# Patient Record
Sex: MALE | Race: WHITE | NOT HISPANIC OR LATINO | Employment: OTHER | URBAN - METROPOLITAN AREA
[De-identification: names, ages, dates, MRNs, and addresses within clinical notes are randomized per-mention and may not be internally consistent; named-entity substitution may affect disease eponyms.]

---

## 2018-03-16 ENCOUNTER — OFFICE VISIT CONVERTED (OUTPATIENT)
Dept: CARDIOLOGY | Facility: CLINIC | Age: 73
End: 2018-03-16
Attending: SPECIALIST

## 2018-04-10 ENCOUNTER — OFFICE VISIT CONVERTED (OUTPATIENT)
Dept: FAMILY MEDICINE CLINIC | Facility: CLINIC | Age: 73
End: 2018-04-10
Attending: FAMILY MEDICINE

## 2018-04-18 ENCOUNTER — CONVERSION ENCOUNTER (OUTPATIENT)
Dept: ORTHOPEDIC SURGERY | Facility: CLINIC | Age: 73
End: 2018-04-18

## 2018-04-18 ENCOUNTER — OFFICE VISIT CONVERTED (OUTPATIENT)
Dept: ORTHOPEDIC SURGERY | Facility: CLINIC | Age: 73
End: 2018-04-18
Attending: ORTHOPAEDIC SURGERY

## 2018-06-12 ENCOUNTER — CONVERSION ENCOUNTER (OUTPATIENT)
Dept: FAMILY MEDICINE CLINIC | Facility: CLINIC | Age: 73
End: 2018-06-12

## 2018-06-12 ENCOUNTER — OFFICE VISIT CONVERTED (OUTPATIENT)
Dept: FAMILY MEDICINE CLINIC | Facility: CLINIC | Age: 73
End: 2018-06-12
Attending: FAMILY MEDICINE

## 2018-06-20 ENCOUNTER — OFFICE VISIT CONVERTED (OUTPATIENT)
Dept: ORTHOPEDIC SURGERY | Facility: CLINIC | Age: 73
End: 2018-06-20
Attending: PHYSICIAN ASSISTANT

## 2018-06-20 ENCOUNTER — CONVERSION ENCOUNTER (OUTPATIENT)
Dept: ORTHOPEDIC SURGERY | Facility: CLINIC | Age: 73
End: 2018-06-20

## 2018-08-01 ENCOUNTER — OFFICE VISIT CONVERTED (OUTPATIENT)
Dept: ORTHOPEDIC SURGERY | Facility: CLINIC | Age: 73
End: 2018-08-01
Attending: PHYSICIAN ASSISTANT

## 2018-08-01 ENCOUNTER — CONVERSION ENCOUNTER (OUTPATIENT)
Dept: ORTHOPEDIC SURGERY | Facility: CLINIC | Age: 73
End: 2018-08-01

## 2018-09-12 ENCOUNTER — OFFICE VISIT CONVERTED (OUTPATIENT)
Dept: ORTHOPEDIC SURGERY | Facility: CLINIC | Age: 73
End: 2018-09-12
Attending: PHYSICIAN ASSISTANT

## 2018-09-12 ENCOUNTER — CONVERSION ENCOUNTER (OUTPATIENT)
Dept: ORTHOPEDIC SURGERY | Facility: CLINIC | Age: 73
End: 2018-09-12

## 2018-09-24 ENCOUNTER — CONVERSION ENCOUNTER (OUTPATIENT)
Dept: FAMILY MEDICINE CLINIC | Facility: CLINIC | Age: 73
End: 2018-09-24

## 2018-09-24 ENCOUNTER — OFFICE VISIT CONVERTED (OUTPATIENT)
Dept: FAMILY MEDICINE CLINIC | Facility: CLINIC | Age: 73
End: 2018-09-24
Attending: FAMILY MEDICINE

## 2018-09-27 ENCOUNTER — OFFICE VISIT CONVERTED (OUTPATIENT)
Dept: CARDIOLOGY | Facility: CLINIC | Age: 73
End: 2018-09-27
Attending: INTERNAL MEDICINE

## 2019-02-07 ENCOUNTER — OFFICE VISIT CONVERTED (OUTPATIENT)
Dept: GASTROENTEROLOGY | Facility: CLINIC | Age: 74
End: 2019-02-07
Attending: PHYSICIAN ASSISTANT

## 2019-02-25 ENCOUNTER — HOSPITAL ENCOUNTER (OUTPATIENT)
Dept: GASTROENTEROLOGY | Facility: HOSPITAL | Age: 74
Setting detail: HOSPITAL OUTPATIENT SURGERY
Discharge: HOME | End: 2019-02-25
Attending: INTERNAL MEDICINE

## 2019-02-25 LAB
BASOPHILS # BLD AUTO: 0.03 10*3/UL (ref 0–0.2)
BASOPHILS NFR BLD AUTO: 0.5 % (ref 0–3)
CONV ABS IMM GRAN: 0.02 10*3/UL (ref 0–0.2)
CONV IMMATURE GRAN: 0.3 % (ref 0–1.8)
DEPRECATED RDW RBC AUTO: 44.4 FL (ref 35.1–43.9)
EOSINOPHIL # BLD AUTO: 0.26 10*3/UL (ref 0–0.7)
EOSINOPHIL # BLD AUTO: 4.5 % (ref 0–7)
ERYTHROCYTE [DISTWIDTH] IN BLOOD BY AUTOMATED COUNT: 13.1 % (ref 11.6–14.4)
HBA1C MFR BLD: 13.8 G/DL (ref 14–18)
HCT VFR BLD AUTO: 40.9 % (ref 42–52)
LYMPHOCYTES # BLD AUTO: 1.41 10*3/UL (ref 1–5)
MCH RBC QN AUTO: 31 PG (ref 27–31)
MCHC RBC AUTO-ENTMCNC: 33.7 G/DL (ref 33–37)
MCV RBC AUTO: 91.9 FL (ref 80–96)
MONOCYTES # BLD AUTO: 0.42 10*3/UL (ref 0.2–1.2)
MONOCYTES NFR BLD AUTO: 7.3 % (ref 3–10)
NEUTROPHILS # BLD AUTO: 3.64 10*3/UL (ref 2–8)
NEUTROPHILS NFR BLD AUTO: 63 % (ref 30–85)
NRBC CBCN: 0 % (ref 0–0.7)
PLATELET # BLD AUTO: 149 10*3/UL (ref 130–400)
PMV BLD AUTO: 10.1 FL (ref 9.4–12.4)
RBC # BLD AUTO: 4.45 10*6/UL (ref 4.7–6.1)
VARIANT LYMPHS NFR BLD MANUAL: 24.4 % (ref 20–45)
WBC # BLD AUTO: 5.78 10*3/UL (ref 4.8–10.8)

## 2019-04-30 ENCOUNTER — CONVERSION ENCOUNTER (OUTPATIENT)
Dept: CARDIOLOGY | Facility: CLINIC | Age: 74
End: 2019-04-30

## 2019-04-30 ENCOUNTER — OFFICE VISIT CONVERTED (OUTPATIENT)
Dept: CARDIOLOGY | Facility: CLINIC | Age: 74
End: 2019-04-30
Attending: INTERNAL MEDICINE

## 2019-05-09 ENCOUNTER — OFFICE VISIT CONVERTED (OUTPATIENT)
Dept: FAMILY MEDICINE CLINIC | Facility: CLINIC | Age: 74
End: 2019-05-09
Attending: FAMILY MEDICINE

## 2019-05-09 ENCOUNTER — HOSPITAL ENCOUNTER (OUTPATIENT)
Dept: FAMILY MEDICINE CLINIC | Facility: CLINIC | Age: 74
Discharge: HOME | End: 2019-05-09
Attending: FAMILY MEDICINE

## 2019-05-09 ENCOUNTER — CONVERSION ENCOUNTER (OUTPATIENT)
Dept: FAMILY MEDICINE CLINIC | Facility: CLINIC | Age: 74
End: 2019-05-09

## 2019-05-11 LAB — BACTERIA SPEC AEROBE CULT: NORMAL

## 2019-05-30 ENCOUNTER — OFFICE VISIT CONVERTED (OUTPATIENT)
Dept: GASTROENTEROLOGY | Facility: CLINIC | Age: 74
End: 2019-05-30
Attending: PHYSICIAN ASSISTANT

## 2019-05-30 ENCOUNTER — CONVERSION ENCOUNTER (OUTPATIENT)
Dept: GASTROENTEROLOGY | Facility: CLINIC | Age: 74
End: 2019-05-30

## 2019-11-07 ENCOUNTER — OFFICE VISIT CONVERTED (OUTPATIENT)
Dept: CARDIOLOGY | Facility: CLINIC | Age: 74
End: 2019-11-07
Attending: INTERNAL MEDICINE

## 2019-11-12 ENCOUNTER — OFFICE VISIT CONVERTED (OUTPATIENT)
Dept: FAMILY MEDICINE CLINIC | Facility: CLINIC | Age: 74
End: 2019-11-12
Attending: FAMILY MEDICINE

## 2019-11-26 ENCOUNTER — OFFICE VISIT CONVERTED (OUTPATIENT)
Dept: FAMILY MEDICINE CLINIC | Facility: CLINIC | Age: 74
End: 2019-11-26
Attending: FAMILY MEDICINE

## 2019-11-26 ENCOUNTER — CONVERSION ENCOUNTER (OUTPATIENT)
Dept: FAMILY MEDICINE CLINIC | Facility: CLINIC | Age: 74
End: 2019-11-26

## 2019-12-09 ENCOUNTER — OFFICE VISIT CONVERTED (OUTPATIENT)
Dept: PULMONOLOGY | Facility: CLINIC | Age: 74
End: 2019-12-09
Attending: PHYSICIAN ASSISTANT

## 2019-12-27 ENCOUNTER — HOSPITAL ENCOUNTER (OUTPATIENT)
Dept: GENERAL RADIOLOGY | Facility: HOSPITAL | Age: 74
Discharge: HOME | End: 2019-12-27
Attending: FAMILY MEDICINE

## 2020-01-13 ENCOUNTER — HOSPITAL ENCOUNTER (OUTPATIENT)
Dept: CARDIOLOGY | Facility: HOSPITAL | Age: 75
Discharge: HOME | End: 2020-01-13
Attending: PHYSICIAN ASSISTANT

## 2020-02-17 ENCOUNTER — OFFICE VISIT CONVERTED (OUTPATIENT)
Dept: PULMONOLOGY | Facility: CLINIC | Age: 75
End: 2020-02-17
Attending: PHYSICIAN ASSISTANT

## 2020-05-14 ENCOUNTER — CONVERSION ENCOUNTER (OUTPATIENT)
Dept: FAMILY MEDICINE CLINIC | Facility: CLINIC | Age: 75
End: 2020-05-14

## 2020-05-14 ENCOUNTER — OFFICE VISIT CONVERTED (OUTPATIENT)
Dept: FAMILY MEDICINE CLINIC | Facility: CLINIC | Age: 75
End: 2020-05-14
Attending: FAMILY MEDICINE

## 2020-06-04 ENCOUNTER — OFFICE VISIT CONVERTED (OUTPATIENT)
Dept: GASTROENTEROLOGY | Facility: CLINIC | Age: 75
End: 2020-06-04
Attending: PHYSICIAN ASSISTANT

## 2020-06-12 ENCOUNTER — OFFICE VISIT CONVERTED (OUTPATIENT)
Dept: CARDIOLOGY | Facility: CLINIC | Age: 75
End: 2020-06-12
Attending: INTERNAL MEDICINE

## 2020-06-15 ENCOUNTER — OFFICE VISIT CONVERTED (OUTPATIENT)
Dept: PULMONOLOGY | Facility: CLINIC | Age: 75
End: 2020-06-15
Attending: PHYSICIAN ASSISTANT

## 2020-06-25 ENCOUNTER — HOSPITAL ENCOUNTER (OUTPATIENT)
Dept: GENERAL RADIOLOGY | Facility: HOSPITAL | Age: 75
Discharge: HOME | End: 2020-06-25
Attending: NURSE PRACTITIONER

## 2020-06-25 ENCOUNTER — OFFICE VISIT CONVERTED (OUTPATIENT)
Dept: FAMILY MEDICINE CLINIC | Facility: CLINIC | Age: 75
End: 2020-06-25
Attending: NURSE PRACTITIONER

## 2020-12-11 ENCOUNTER — OFFICE VISIT CONVERTED (OUTPATIENT)
Dept: PULMONOLOGY | Facility: CLINIC | Age: 75
End: 2020-12-11
Attending: NURSE PRACTITIONER

## 2020-12-17 ENCOUNTER — OFFICE VISIT CONVERTED (OUTPATIENT)
Dept: CARDIOLOGY | Facility: CLINIC | Age: 75
End: 2020-12-17
Attending: INTERNAL MEDICINE

## 2020-12-28 ENCOUNTER — HOSPITAL ENCOUNTER (OUTPATIENT)
Dept: GENERAL RADIOLOGY | Facility: HOSPITAL | Age: 75
Discharge: HOME | End: 2020-12-28
Attending: NURSE PRACTITIONER

## 2021-02-09 ENCOUNTER — OFFICE VISIT CONVERTED (OUTPATIENT)
Dept: FAMILY MEDICINE CLINIC | Facility: CLINIC | Age: 76
End: 2021-02-09
Attending: FAMILY MEDICINE

## 2021-02-09 ENCOUNTER — HOSPITAL ENCOUNTER (OUTPATIENT)
Dept: FAMILY MEDICINE CLINIC | Facility: CLINIC | Age: 76
Discharge: HOME | End: 2021-02-09
Attending: FAMILY MEDICINE

## 2021-02-09 ENCOUNTER — CONVERSION ENCOUNTER (OUTPATIENT)
Dept: FAMILY MEDICINE CLINIC | Facility: CLINIC | Age: 76
End: 2021-02-09

## 2021-02-09 LAB
APPEARANCE UR: ABNORMAL
BILIRUB UR QL: NEGATIVE
COLOR UR: YELLOW
CONV BACTERIA: ABNORMAL
CONV COLLECTION SOURCE (UA): ABNORMAL
CONV UROBILINOGEN IN URINE BY AUTOMATED TEST STRIP: 0.2 {EHRLICHU}/DL (ref 0.1–1)
GLUCOSE UR QL: NEGATIVE MG/DL
HGB UR QL STRIP: NEGATIVE
KETONES UR QL STRIP: NEGATIVE MG/DL
LEUKOCYTE ESTERASE UR QL STRIP: ABNORMAL
NITRITE UR QL STRIP: NEGATIVE
PH UR STRIP.AUTO: 5 [PH] (ref 5–8)
PROT UR QL: NEGATIVE MG/DL
RBC #/AREA URNS HPF: ABNORMAL /[HPF]
SP GR UR: 1.02 (ref 1–1.03)
WBC #/AREA URNS HPF: ABNORMAL /[HPF]

## 2021-02-11 LAB
AMPICILLIN SUSC ISLT: >=32
AMPICILLIN+SULBAC SUSC ISLT: >=32
BACTERIA UR CULT: ABNORMAL
CEFAZOLIN SUSC ISLT: 8
CEFEPIME SUSC ISLT: <=0.12
CEFTAZIDIME SUSC ISLT: <=1
CEFTRIAXONE SUSC ISLT: 0.5
CIPROFLOXACIN SUSC ISLT: <=0.25
ERTAPENEM SUSC ISLT: <=0.12
GENTAMICIN SUSC ISLT: <=1
LEVOFLOXACIN SUSC ISLT: <=0.12
NITROFURANTOIN SUSC ISLT: <=16
PIP+TAZO SUSC ISLT: 8
TMP SMX SUSC ISLT: <=20
TOBRAMYCIN SUSC ISLT: <=1

## 2021-03-03 ENCOUNTER — HOSPITAL ENCOUNTER (OUTPATIENT)
Dept: GENERAL RADIOLOGY | Facility: HOSPITAL | Age: 76
Discharge: HOME | End: 2021-03-03
Attending: FAMILY MEDICINE

## 2021-03-04 ENCOUNTER — OFFICE VISIT CONVERTED (OUTPATIENT)
Dept: PULMONOLOGY | Facility: CLINIC | Age: 76
End: 2021-03-04
Attending: PHYSICIAN ASSISTANT

## 2021-03-04 ENCOUNTER — HOSPITAL ENCOUNTER (OUTPATIENT)
Dept: FAMILY MEDICINE CLINIC | Facility: CLINIC | Age: 76
Discharge: HOME | End: 2021-03-04
Attending: FAMILY MEDICINE

## 2021-03-04 LAB
APPEARANCE UR: CLEAR
BILIRUB UR QL: NEGATIVE
COLOR UR: YELLOW
CONV COLLECTION SOURCE (UA): NORMAL
CONV UROBILINOGEN IN URINE BY AUTOMATED TEST STRIP: 0.2 {EHRLICHU}/DL (ref 0.1–1)
GLUCOSE UR QL: NEGATIVE MG/DL
HGB UR QL STRIP: NEGATIVE
KETONES UR QL STRIP: NEGATIVE MG/DL
LEUKOCYTE ESTERASE UR QL STRIP: NEGATIVE
NITRITE UR QL STRIP: NEGATIVE
PH UR STRIP.AUTO: 6 [PH] (ref 5–8)
PROT UR QL: NEGATIVE MG/DL
SP GR UR: 1.02 (ref 1–1.03)

## 2021-03-06 LAB — BACTERIA UR CULT: NORMAL

## 2021-04-14 ENCOUNTER — HOSPITAL ENCOUNTER (OUTPATIENT)
Dept: PREADMISSION TESTING | Facility: HOSPITAL | Age: 76
Discharge: HOME | End: 2021-04-14
Attending: INTERNAL MEDICINE

## 2021-04-14 LAB — SARS-COV-2 RNA SPEC QL NAA+PROBE: NOT DETECTED

## 2021-04-19 ENCOUNTER — HOSPITAL ENCOUNTER (OUTPATIENT)
Dept: GASTROENTEROLOGY | Facility: HOSPITAL | Age: 76
Setting detail: HOSPITAL OUTPATIENT SURGERY
Discharge: HOME | End: 2021-04-19
Attending: INTERNAL MEDICINE

## 2021-05-13 NOTE — PROGRESS NOTES
Progress Note      Patient Name: Frank Winter   Patient ID: [id number]   Sex: Male   YOB: 1945    Primary Care Provider: Frank Mei MD    Visit Date: June 4, 2020    Provider: Smooth Mendoza PA-C   Location: [facility]   Location Address: [address] [place] [address]  [id number]   Location Phone: [phone]          Chief Complaint  · Follow-up      History Of Present Illness     74-year-old male with history of diverticulosis, hemorrhoids, and history of colon polyps returns for his annual follow-up.  EGD/colonoscopy Dr. Griffin last year consistent with diverticulosis of the sigmoid colon, erosive gastritis, hemorrhoids, Tobias's esophagus without dysplasia, multiple tubular adenoma successfully moved from the colon.  He also had H. pylori on biopsy was treated with a Prevpac for 10 days and had H. pylori breath test to confirm eradication.  Recommend he have an EGD in 2 years and a colonoscopy in 3 years.  His brother had colon cancer.       Past Medical History  Anemia; Arthritis; Colon cancer; Colon Polyps; Essential hypertension; Helicobacter pylori (H. pylori); Hernia; High cholesterol; HTN (hypertension); Hyperlipemia; Hypertension; Limb Swelling; Primary osteoarthritis of left hip; Shortness of Breath; Thyroid disorder; Thyroid nodule         Past Surgical History  cardiac stents; Colonoscopy; EGD; Hernia         Medication List  amlodipine 2.5 mg oral tablet; aspirin 81 mg oral tablet,delayed release (DR/EC); atorvastatin 80 mg oral tablet; Bystolic 10 mg oral tablet; lisinopril-hydrochlorothiazide 20-12.5 mg oral tablet; Lovaza 1 gram oral capsule; ProAir HFA 90 mcg/actuation inhalation HFA aerosol inhaler; Spiriva Respimat 2.5 mcg/actuation inhalation mist; Ventolin HFA 90 mcg/actuation inhalation HFA aerosol inhaler         Allergy List  NO KNOWN DRUG ALLERGIES         Family Medical History  Colon Neoplasm, Malignant; Heart Disease; Cancer, Unspecified; Family history  "of colon cancer; Family history of heart disease         Social History  Alcohol (Never); Disabled; lives with spouse; .; Recreational Drug Use (Current some day); Retired.; Sedentary; Tobacco (Current every day)         Review of Systems  · Constitutional  o Denies  o : chills, fever  · Cardiovascular  o Denies  o : chest pain  · Respiratory  o Denies  o : shortness of breath  · Gastrointestinal  o Denies  o : nausea, vomiting, dysphagia  · Endocrine  o Denies  o : weight gain, weight loss      Vitals  Date Time BP Position Site L\R Cuff Size HR RR TEMP (F) WT  HT  BMI kg/m2 BSA m2 O2 Sat HC       06/04/2020 12:53 /70 Sitting    82 - R 16 98.9 227lbs 0oz 5'  10\" 32.57 2.26           Physical Examination  · Constitutional  o Appearance  o : Healthy-appearing, awake and alert in no acute distress  · Head and Face  o Head  o : Normocephalic with no worriesome skin lesions  · Eyes  o Vision  o :   § Visual Fields  § : eyes move symmetrical in all directions  o Sclerae  o : sclerae anicteric  · Neck  o Inspection/Palpation  o : Trachea is midline, no adenopathy  · Respiratory  o Respiratory Effort  o : Breathing is unlabored.  o Inspection of Chest  o : normal appearance  o Auscultation of Lungs  o : Chest is clear to auscultation bilaterally.  · Cardiovascular  o Heart  o :   § Auscultation of Heart  § : no murmurs, rubs, or gallops  o Peripheral Vascular System  o :   § Extremities  § : no cyanosis, clubbing or edema;   · Gastrointestinal  o Abdominal Examination  o : Abdomen is soft, nontender to palpation, with normal active bowel sounds, no appreciable hepatosplenomegaly.          Assessment  · Personal history of colonic polyps     V12.72/Z86.010  · Family history of colon cancer     V16.0/Z80.0  · GERD (gastroesophageal reflux disease)     530.81/K21.9  · Tobias esophagus     530.85/K22.70      Plan  · Medications  o pantoprazole 40 mg oral tablet,delayed release (DR/EC)   SIG: take 1 tablet (40 mg) " by oral route once daily for 90 days   DISP: (90) tablets with 6 refills  Prescribed on 06/04/2020     o Medications have been Reconciled  o Transition of Care or Provider Policy  · Instructions  o 74-year-old with Tobias's esophagus and GERD. I will resume his PPI therapy with Protonix 40 mg daily. He will follow-up as needed. He will be due for an EGD 02/2021.            Electronically Signed by: Smooth Mendoza PA-C -Author on June 5, 2020 08:17:48 AM  Electronically Co-signed by: Amado Griffin MD -Reviewer on June 9, 2020 06:20:38 PM

## 2021-05-13 NOTE — PROGRESS NOTES
Progress Note      Patient Name: Frank Winter   Patient ID: [id number]   Sex: Male   YOB: 1945    Primary Care Provider: Frank Mei MD   Referring Provider: Mervat HOGUE    Visit Date: June 25, 2020    Provider: MYKEL Reis   Location: [facility]   Location Address: [address] [place] [address], [address] [place] [address]  [id number]   Location Phone: [phone]          Chief Complaint  · wrist pain  · patient states that he fell off a wagon 20 years ago and injured his right wrist. It hurts occassionally but seems worse recently. It has been hurting consistently over past 3 days  · no swelling      History Of Present Illness  Frank Winter is a 75 year old /White male who presents for evaluation and treatment of: pt has pain in his R wrist for 3 days. he mowed yesterday and has pain posterior wrist and thumb area. he has used a wrist support but it doesn't help much.       Past Medical History  Disease Name Date Onset Notes   Anemia --  --    Arthritis --  --    Colon cancer --  --    Colon Polyps --  --    Essential hypertension --  --    Helicobacter pylori (H. pylori) 2019 --    Hernia --  --    High cholesterol --  --    HTN (hypertension) --  --    Hyperlipemia --  --    Hypertension --  --    Limb Swelling --  --    Primary osteoarthritis of left hip 04/18/2018 --    Shortness of Breath --  --    Thyroid disorder --  --    Thyroid nodule --  --          Past Surgical History  Procedure Name Date Notes   cardiac stents 2011 --    Colonoscopy 2013 2019 --    EGD 2019 --    Hernia 1990 --          Medication List  Name Date Started Instructions   aspirin 81 mg oral tablet,delayed release (DR/EC)  take 1 tablet (81 mg) by oral route once daily   atorvastatin 80 mg oral tablet 02/18/2020 TAKE 1 TABLET AT BEDTIME   Bystolic 10 mg oral tablet 04/30/2020 take 1 tablet (10 mg) by oral route once daily   lisinopril-hydrochlorothiazide 20-12.5 mg oral tablet 01/16/2020 TAKE  1 TABLET TWICE DAILY   Lovaza 1 gram oral capsule 05/14/2020 take 2 capsules (2 gram) by oral route 2 times per day for 90 days   montelukast 10 mg oral tablet  take 1 tablet (10 mg) by oral route once daily in the evening   pantoprazole 40 mg oral tablet,delayed release (DR/EC) 06/04/2020 take 1 tablet (40 mg) by oral route once daily for 90 days   ProAir HFA 90 mcg/actuation inhalation HFA aerosol inhaler 06/12/2018 inhale 2 puffs (180 mcg) by inhalation route every 4 hours as needed   Spiriva Respimat 2.5 mcg/actuation inhalation mist 11/26/2019 inhale 2 puffs (5 mcg) by inhalation route once daily at the same time each day   Ventolin HFA 90 mcg/actuation inhalation HFA aerosol inhaler 11/12/2019 INHALE TWO PUFFS BY MOUTH EVERY 4 HOURS AS NEEDED         Allergy List  Allergen Name Date Reaction Notes   NO KNOWN DRUG ALLERGIES --  --  --          Family Medical History  Disease Name Relative/Age Notes   Colon Neoplasm, Malignant Brother/25   --    Heart Disease Mother/   Mother   Cancer, Unspecified Brother/   Brother   Family history of colon cancer Brother/   Brother   Family history of heart disease Father/   Father         Social History  Finding Status Start/Stop Quantity Notes   Alcohol Never --/-- --  does not drink   Disabled --  --/-- --  --    lives with spouse --  --/-- --  --    . --  --/-- --  --    Recreational Drug Use Current some day --/-- --  yes   Retired. --  --/-- --  --    Sedentary --  --/-- --  --    Tobacco Current every day --/-- 1.5 PPD current every day smoker, 1.5 packs per day, smoked 31 or more years  1 packs per day, smoked 31 or more years         Immunizations  NameDate Admin Mfg Trade Name Lot Number Route Inj VIS Given VIS Publication   Pgjifobnw45/12/2019 SK[facility] Fluarix, quadrivalent, preservative free [id number] IM  11/12/2019    Comments: Patient tolerated injection well.   Hyqfqxfsg03/24/2018 [facility] FLUZONE [id number] IM  09/24/2018 08/07/2015   Comments: Pt tolerated the  injection well.   Rwkdfqkri33/25/2017 SKB Fluarix, quadrivalent, preservative free [id number] IM LD 09/25/2017 07/02/2012   Comments:    Yxbatxams29/30/2016 [employer] Fluarix, quadrivalent, preservative free [id number] IM RA 09/30/2016 08/07/2015   Comments: Pt. left office in stable condition.   Snkrllrbi73/31/2015 [employer] Fluarix, quadrivalent, preservative free [id number] IM RD 12/31/2015 07/02/2012   Comments: pt left office in stable condition   Sdfvxjkjd02/27/2014 SKB Fluarix, quadrivalent, preservative free [id number] IM LD 10/27/2014 07/02/2012   Comments:    Urvhgjowl23/04/2013 SK[facility] Fluarix-PF > 3 Years [id number] IM RD 11/04/2013 07/02/2012   Comments:    Virqnaxcfsra29/09/2012 MSD PNEUMOVAX 23 [id number] IM RD 10/09/2012 10/09/2012   Comments:    Dqoeilmfc7502/12/2019 MSD PNEUMOVAX 23 [id number] IM  11/12/2019    Comments: Patient tolerated injection well.   Prevnar 1309/30/2016 [facility] PREVNAR 13 [id number] IM LA 09/30/2016 11/24/2015   Comments: Pt. left office in stable condition.   Tdap10/27/2014 [facility] BOOSTRIX [id number] IM RD 10/27/2014 01/24/2012   Comments:          Review of Systems  · Constitutional  o Denies  o : fatigue, night sweats  · Eyes  o Denies  o : double vision, blurred vision  · HENT  o Denies  o : vertigo, recent head injury  · Breasts  o Denies  o : abnormal changes in breast size, additional breast symptoms except as noted in the HPI  · Cardiovascular  o Denies  o : chest pain, irregular heart beats  · Respiratory  o Denies  o : shortness of breath, productive cough  · Gastrointestinal  o Denies  o : nausea, vomiting  · Genitourinary  o Denies  o : dysuria, urinary retention  · Integument  o Denies  o : hair growth change, new skin lesions  · Neurologic  o Denies  o : altered mental status, seizures  · Musculoskeletal  o Admits  o : r wrist pain  o Denies  o : joint swelling  · Endocrine  o Denies  o : cold intolerance, heat intolerance  · Heme-Lymph  o Denies  o : petechiae, lymph node enlargement or  "tenderness  · Allergic-Immunologic  o Denies  o : frequent illnesses      Vitals  Date Time BP Position Site L\R Cuff Size HR RR TEMP (F) WT  HT  BMI kg/m2 BSA m2 O2 Sat HC       06/25/2020 11:44 /66 Sitting    75 - R 19 98.1 228lbs 3oz 5'  10\" 32.74 2.26 96 %          Physical Examination  · Constitutional  o Appearance  o : well-nourished, well developed, alert, in no acute distress  · Eyes  o Conjunctivae  o : conjunctivae normal  o Sclerae  o : sclerae white  o Pupils and Irises  o : pupils equal, round, and reactive to light and accommodation bilaterally  o Corneas  o : tear film normal, no lesions present  o Eyelids/Ocular Adnexae  o : eyelid appearance normal, no exudates present, eye moisture level normal  · Ears, Nose, Mouth and Throat  o Ears  o : external ear auricle normal, otic canal normal, TM with no reddness, effusion, retraction  o Nose  o : external normal, nasal mucosa normal, turbinates normal  o Oral Cavity  o : tongue no lesion, oral mucosa normal  o Throat  o : no erythemia, exudate or lesions  · Neck  o Inspection/Palpation  o : normal appearance, no masses or tenderness, trachea midline, no enlarged cervical or supraclavicular lymphnodes palpated  o Thyroid  o : gland size normal, nontender, no nodules or masses present on palpation, thyroid motion normal during swallowing  · Respiratory  o Respiratory Effort  o : breathing unlabored  o Inspection of Chest  o : normal appearance, no retractions  o Auscultation of Lungs  o : normal breath sounds throughout  · Cardiovascular  o Heart  o :   § Auscultation of Heart  § : regular rate and rhythm without murmur  · Musculoskeletal  o General  o :   § General Musculoskeletal  § : No joint swelling or deformity., Muscle tone, strength, and development grossly normal.  o Right Upper Extremity  o :   § Inspection/Palpation  § : no tenderness to palpation, no edema present  § Range of Motion  § : discomfort with flexion  · Skin and Subcutaneous " Tissue  o General Inspection  o : no rashes or lesions present, no areas of discoloration  · Neurologic  o Mental Status Examination  o : judgement, insight intact, modd and affect appropriate  o Motor Examination  o : strength grossly intact in all four extremities  o Gait and Station  o : normal gait, able to stand without difficulty          Assessment  · Wrist pain, right     719.43/M25.531      Plan  · Orders  o ACO-39: Current medications updated and reviewed () - - 06/25/2020  o Wrist xray right 3v [facility] ([id number]) - 719.43/M25.531 - 06/25/2020  · Medications  o prednisone 20 mg oral tablet   SIG: take 2 tablet today and then one tablet by oral route once daily x 4 days   DISP: (6) tablets with 0 refills  Prescribed on 06/25/2020     o Voltaren 1 % topical gel   SIG: apply 2 grams to the affected area(s) by topical route 3 times per day   DISP: (1) 100 gm tube with 0 refills  Prescribed on 06/25/2020     o amlodipine 2.5 mg oral tablet   SIG: PO PRN for Hypertension   DISP: (0) tablet with 0 refills  Discontinued on 06/25/2020     o Medications have been Reconciled  o Transition of Care or Provider Policy  · Instructions  o Patient was educated/instructed on their diagnosis, treatment and medications prior to discharge from the clinic today.  · Disposition  o Call or Return if symptoms worsen or persist.            Electronically Signed by: Mervat Brooke APRN -Author on July 6, 2020 09:17:29 AM

## 2021-05-13 NOTE — PROGRESS NOTES
"   Progress Note      Patient Name: Frank Winter   Patient ID: [id number]   Sex: Male   YOB: 1945    Primary Care Provider: Frank Mei MD    Visit Date: June 12, 2020    Provider: Fernie Kevin MD   Location: [facility]   Location Address: [address] [place] [address] A   [place], [place]  [id number]   Location Phone: [phone]          Chief Complaint  · Hypertension  · Previous aortic dissection       History Of Present Illness  REFERRING CARE PROVIDER: Frank Mei MD   Frank Winter is a 75-year-old gentleman with a history of aortic dissection, coronary artery disease, hypertension, and dyslipidemia who has been doing well. Denies chest pain. Has stable shortness of breath, chronic cough, and wheezing.   PAST MEDICAL HISTORY: Nonocclusive coronary artery disease; hypertension; dyslipidemia; aortic dissection.   FAMILY HISTORY: Positive for hypertension. Negative for diabetes and heart disease.   PSYCHOSOCIAL HISTORY: Positive for history of mood change or depression. She does not drink alcohol. She previously smoked but quit.   CURRENT MEDICATIONS: include Ventolin inhaler; Symbicort; Spiriva; Atorvastatin 80 mg daily; Lisinopril/HCTZ 20/12.5 mg b.i.d.; Bystolic 10 mg daily; Pantoprazole 40 mg daily; Omega 3; aspirin 81 mg daily. The dosage and frequency of the medications were reviewed with the patient.       Review of Systems  · Cardiovascular  o Admits  o : shortness of breath while walking or lying flat  o Denies  o : palpitations (fast, fluttering, or skipping beats), swelling (feet, ankles, hands), chest pain or angina pectoris   · Respiratory  o Admits  o : chronic or frequent cough, asthma or wheezing      Vitals  Date Time BP Position Site L\R Cuff Size HR RR TEMP (F) WT  HT  BMI kg/m2 BSA m2 O2 Sat HC       06/12/2020 11:58 /76 Sitting    62 - R   227lbs 0oz 5'  10\" 32.57 2.26           Physical Examination  · Constitutional  o Appearance  o : Awake, alert, in " no acute distress.   · Eyes  o Conjunctivae  o : Normal.  · Ears, Nose, Mouth and Throat  o Oral Cavity  o :   § Oral Mucosa  § : Normal.  · Neck  o Inspection/Palpation  o : No JVD. Good carotid upstroke. No thyromegaly.  · Respiratory  o Respiratory  o : Good respiratory effort. Clear to percussion and auscultation.  · Cardiovascular  o Heart  o :   § Auscultation of Heart  § : S1, S2 normal. Regular rate and rhythm without murmurs, gallops, or rubs.  o Peripheral Vascular System  o :   § Extremities  § : Good femoral and pedal pulses. No pedal edema.  · Gastrointestinal  o Abdominal Examination  o : Soft. No tenderness or masses felt. No hepatosplenomegaly. Abdominal aorta is not palpable.  · Labs  o Labs  o : LDL cholesterol 68, HDL 36.           Assessment     ASSESSMENT AND PLAN:  1.   Coronary artery disease with no angina.  On chronic aspirin 81 mg once a day.   2.   Hypertension, controlled.   3.   Dyslipidemia, on statin and tolerating well.       MD ANDREW Masterson/abi    This note was transcribed by Ivory Poole.  pap/andrew  The above service was transcribed by Ivory Poole, and I attest to the accuracy of the note.  ANDREW             Electronically Signed by: Yeni Poole-, Other -Author on June 17, 2020 11:38:14 AM  Electronically Co-signed by: Fernie Kevin MD -Reviewer on June 17, 2020 05:33:45 PM

## 2021-05-13 NOTE — PROGRESS NOTES
Progress Note      Patient Name: Frank Winter   Patient ID: [id number]   Sex: Male   YOB: 1945    Primary Care Provider: Frank Mei MD    Visit Date: May 14, 2020    Provider: Frank Mei MD   Location: [facility]   Location Address: [address] [place] [address], [address] [place] [address]  [id number]   Location Phone: [phone]          Chief Complaint     6 month follow up  Lab results       History Of Present Illness  Frank Winter, 74 year old /White male, presents today for: f/u.      Pt presents for f/u     Hx of HTN: BP controlled. He is established with Cardioloigst.       Hx of COPD. stable. He is on proair prn. He sees Pulm on regular visits.       hx of HLD. compliant with meds. Usually controlled.  However the triglycerides are high. He is already on generic fish oil 4 pills a day.      the glucose mildly elevated.    he stopped smoking cigarettes in Nov 2019...SO proud of pt.       Past Medical History  Disease Name Date Onset Notes   Anemia --  --    Arthritis --  --    Colon cancer --  --    Colon Polyps --  --    Essential hypertension --  --    Helicobacter pylori (H. pylori) 2019 --    Hernia --  --    High cholesterol --  --    HTN (hypertension) --  --    Hyperlipemia --  --    Hypertension --  --    Limb Swelling --  --    Primary osteoarthritis of left hip 04/18/2018 --    Shortness of Breath --  --    Thyroid disorder --  --    Thyroid nodule --  --          Past Surgical History  Procedure Name Date Notes   cardiac stents 2011 --    Colonoscopy 2013 2019 --    EGD 2019 --    Hernia 1990 --          Medication List  Name Date Started Instructions   amlodipine 2.5 mg oral tablet  PO PRN for Hypertension   aspirin 81 mg oral tablet,delayed release (DR/EC)  take 1 tablet (81 mg) by oral route once daily   atorvastatin 80 mg oral tablet 02/18/2020 TAKE 1 TABLET AT BEDTIME   Bystolic 10 mg oral tablet 04/30/2020 take 1 tablet (10 mg) by oral route once daily    lisinopril-hydrochlorothiazide 20-12.5 mg oral tablet 01/16/2020 TAKE 1 TABLET TWICE DAILY   Lovaza 1 gram oral capsule 05/14/2020 take 2 capsules (2 gram) by oral route 2 times per day for 90 days   ProAir HFA 90 mcg/actuation inhalation HFA aerosol inhaler 06/12/2018 inhale 2 puffs (180 mcg) by inhalation route every 4 hours as needed   Spiriva Respimat 2.5 mcg/actuation inhalation mist 11/26/2019 inhale 2 puffs (5 mcg) by inhalation route once daily at the same time each day   Spiriva with HandiHaler 18 mcg inhalation capsule, w/inhalation device  inhale 1 capsule (18 mcg) by inhalation route once daily   Ventolin HFA 90 mcg/actuation inhalation HFA aerosol inhaler 11/12/2019 INHALE TWO PUFFS BY MOUTH EVERY 4 HOURS AS NEEDED         Allergy List  Allergen Name Date Reaction Notes   NO KNOWN DRUG ALLERGIES --  --  --        Allergies Reconciled  Family Medical History  Disease Name Relative/Age Notes   Colon Neoplasm, Malignant Brother/25   --    Heart Disease Mother/   Mother   Cancer, Unspecified Brother/   Brother   Family history of colon cancer Brother/   Brother   Family history of heart disease Father/   Father         Social History  Finding Status Start/Stop Quantity Notes   Alcohol Never --/-- --  does not drink   Alcohol Use Never --/-- --  does not drink   Disabled --  --/-- --  --    lives with spouse --  --/-- --  --     --  --/-- --  lives with wife   . --  --/-- --  --    Recreational Drug Use Current some day --/-- --  yes   Retired --  --/-- --  --    Retired. --  --/-- --  --    Sedentary --  --/-- --  --    Tobacco Current every day --/-- 1.5 PPD current every day smoker, 1.5 packs per day, smoked 31 or more years  1 packs per day, smoked 31 or more years         Immunizations  NameDate Admin Mfg Trade Name Lot Number Route Inj VIS Given VIS Publication   Pkklnssdw98/12/2019 SKB Fluarix, quadrivalent, preservative free [id number] IM  11/12/2019    Comments: Patient tolerated  injection well.   Kqzqdiimy97/24/2018 [facility] FLUZONE [id number] IM  09/24/2018 08/07/2015   Comments: Pt tolerated the injection well.   Qqtaprkna95/25/2017 SKB Fluarix, quadrivalent, preservative free [id number] IM LD 09/25/2017 07/02/2012   Comments:    Lihslvggt41/30/2016 SKB Fluarix, quadrivalent, preservative free [id number] IM RA 09/30/2016 08/07/2015   Comments: Pt. left office in stable condition.   Spmwqywnw22/31/2015 SKB Fluarix, quadrivalent, preservative free [id number] IM RD 12/31/2015 07/02/2012   Comments: pt left office in stable condition   Qwuhabdkv21/27/2014 SKB Fluarix, quadrivalent, preservative free [id number] IM LD 10/27/2014 07/02/2012   Comments:    Nsfhmflgf35/04/2013 SKB Fluarix-PF > 3 Years [id number] IM RD 11/04/2013 07/02/2012   Comments:    Xpzkyghvyjxz59/09/2012 MSD PNEUMOVAX 23 [id number] IM RD 10/09/2012 10/09/2012   Comments:    Mqwjaxlvx7078/12/2019 MSD PNEUMOVAX 23 [id number] IM  11/12/2019    Comments: Patient tolerated injection well.   Prevnar 1309/30/2016 [facility] PREVNAR 13 [id number] IM LA 09/30/2016 11/24/2015   Comments: Pt. left office in stable condition.   Tdap10/27/2014 SK[facility] BOOSTRIX [id number] IM RD 10/27/2014 01/24/2012   Comments:          Review of Systems  · Constitutional  o Denies  o : night sweats  · Eyes  o Denies  o : double vision, blurred vision  · HENT  o Denies  o : vertigo, recent head injury  · Breasts  o Denies  o : abnormal changes in breast size, additional breast symptoms except as noted in the HPI  · Cardiovascular  o Denies  o : chest pain, irregular heart beats  · Respiratory  o Denies  o : shortness of breath, productive cough  · Gastrointestinal  o Denies  o : nausea, vomiting  · Genitourinary  o Denies  o : dysuria, urinary retention  · Integument  o Denies  o : hair growth change, new skin lesions  · Neurologic  o Denies  o : altered mental status, seizures  · Musculoskeletal  o Denies  o : joint swelling, limitation of motion  · Endocrine  o Denies  o : cold intolerance, heat  "intolerance  · Heme-Lymph  o Denies  o : petechiae, lymph node enlargement or tenderness  · Allergic-Immunologic  o Denies  o : frequent illnesses      Vitals  Date Time BP Position Site L\R Cuff Size HR RR TEMP (F) WT  HT  BMI kg/m2 BSA m2 O2 Sat HC       05/14/2020 02:26 /72 Sitting    79 - R  97.9 228lbs 5oz 5'  10\" 32.76 2.26 99 %          Physical Examination  · Constitutional  o Appearance  o : alert, in no acute distress, well developed, well-nourished  · Head and Face  o Head  o : normocephalic, atraumatic, non tender, no palpable masses or nodules.  o Face  o : no facial lesions  · Eyes  o Vision  o : Acuity: grossly normal at distance, Conjuntivae: Normal, Sclerae white  · Respiratory  o Auscultation of Lungs  o : normal breath sounds throughout  · Cardiovascular  o Heart  o : Regular rate and rhythm, Normal S1,S2   · Psychiatric  o Mood and Affect  o : normal mood and affect          Assessment  · Anemia     285.9/D64.9  · Routine lab draw     V72.60/Z01.89  · Elevated glucose     790.29/R73.09  · Hypertension     401.9/I10  · Hyperlipidemia     272.4/E78.5  · Vitamin D deficiency     268.9/E55.9       f/u as directed    start branded lovaza.    keep appt with specialists.             Plan  · Orders  o CBC with Auto Diff [facility] (48489) - 285.9/D64.9, V72.60/Z01.89 - 11/14/2020  o CMP [facility] (32709) - V72.60/Z01.89, 401.9/I10 - 11/14/2020  o TSH [facility] (78287) - V72.60/Z01.89, 401.9/I10 - 11/14/2020  o Lipid Panel [facility] (95842) - V72.60/Z01.89, 272.4/E78.5 - 11/14/2020  o Hgb A1c [facility] (55212) - V72.60/Z01.89, 790.29/R73.09 - 11/14/2020  o Vitamin D (25-Hydroxy) Level (93929) - V72.60/Z01.89, 268.9/E55.9 - 11/14/2020  o Iron Profile (Iron 18755 TIBC 57247 and Transferrin 77711) (IRONP) - 285.9/D64.9, V72.60/Z01.89 - 11/14/2020  o Microalbumin urine (25030) - V72.60/Z01.89, 790.29/R73.09 - 11/14/2020  o ACO-14: Influenza immunization administered or previously received () - - 05/19/2020  o ACO-39: Current " medications updated and reviewed () - - 05/19/2020  · Medications  o Medications have been Reconciled  o Transition of Care or Provider Policy  · Instructions  o Electronically Identified Patient Education Materials Provided Electronically  · Disposition  o Call or Return if symptoms worsen or persist.  o Care Transition            Electronically Signed by: Frank Mei MD -Author on May 19, 2020 12:29:40 PM

## 2021-05-14 VITALS
SYSTOLIC BLOOD PRESSURE: 154 MMHG | BODY MASS INDEX: 33.21 KG/M2 | DIASTOLIC BLOOD PRESSURE: 84 MMHG | WEIGHT: 232 LBS | HEART RATE: 68 BPM | HEIGHT: 70 IN

## 2021-05-14 VITALS
RESPIRATION RATE: 18 BRPM | DIASTOLIC BLOOD PRESSURE: 78 MMHG | HEIGHT: 70 IN | HEART RATE: 78 BPM | WEIGHT: 231.56 LBS | TEMPERATURE: 96.9 F | OXYGEN SATURATION: 96 % | BODY MASS INDEX: 33.15 KG/M2 | SYSTOLIC BLOOD PRESSURE: 135 MMHG

## 2021-05-14 NOTE — PROGRESS NOTES
"   Progress Note      Patient Name: Frank Winter   Patient ID: [id number]   Sex: Male   YOB: 1945    Primary Care Provider: Frank Mei MD    Visit Date: December 17, 2020    Provider: Fernie Kevin MD   Location: [facility]   Location Address: [address] [place] [address], [facility]   [place], [place]  [id number]   Location Phone: [phone]          Chief Complaint     Coronary artery disease.       History Of Present Illness  Frank Winter is a 75 year old /White male with CAD, aortic dissection, hypertension, and dyslipidemia who has had stable shortness of breath and chronic coughing. No chest discomfort or myalgia symptoms.   PAST MEDICAL HISTORY: Aortic dissection; Coronary artery disease; Dyslipidemia; Hypertension.   PSYCHOSOCIAL HISTORY: Previously smoked, but quit. Denies alcohol use.   CURRENT MEDICATIONS: Bystolic 10 mg daily; lisinopril-hydrochlorothiazide 20-12.5 mg b.i.d.; atorvastatin 80 mg q. h.s.; aspirin 81 mg daily; montelukast 10 mg daily; Ventolin; Spiriva; Symbicort; Omega-3; pantoprazole 40 mg daily.      ALLERGIES:  No known drug allergies.       Review of Systems  · Cardiovascular  o Admits  o : shortness of breath while walking or lying flat  o Denies  o : palpitations (fast, fluttering, or skipping beats), swelling (feet, ankles, hands), chest pain or angina pectoris   · Respiratory  o Admits  o : chronic or frequent cough      Vitals  Date Time BP Position Site L\R Cuff Size HR RR TEMP (F) WT  HT  BMI kg/m2 BSA m2 O2 Sat FR L/min FiO2 HC       12/17/2020 02:54 /84 Sitting    68 - R   231lbs 16oz 5'  10\" 33.29 2.28       12/17/2020 02:54 /88 Sitting    70 - R                   Physical Examination  · Constitutional  o Appearance  o : Awake, alert, in no acute distress.   · Eyes  o Conjunctivae  o : Normal.  · Ears, Nose, Mouth and Throat  o Oral Cavity  o :   § Oral Mucosa  § : Normal.  · Neck  o Inspection/Palpation  o : No JVD. Good carotid upstroke. No " thyromegaly.  · Respiratory  o Respiratory  o : Good respiratory effort. Clear to percussion and auscultation.  · Cardiovascular  o Heart  o :   § Auscultation of Heart  § : S1, S2 normal. Regular rate and rhythm without murmurs, gallops, or rubs.  o Peripheral Vascular System  o :   § Extremities  § : Good femoral and pedal pulses. No pedal edema.  · Gastrointestinal  o Abdominal Examination  o : Soft. No tenderness or masses felt. No hepatosplenomegaly. Abdominal aorta is not palpable.  · EKG  o EKG  o : Performed in the office today.  o Indications  o : Coronary artery disease.   o Results  o : Normal sinus rhythm. Right bundle branch block. 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  o Comparison  o : No change from prior EKG.           Assessment     ASSESSMENT & PLAN:    1.  Coronary artery disease.  No angina.  On chronic aspirin 81 mg once a day.  2.  Hypertension.  Mildly elevated today.  Recommend adding Norvasc 5 mg once a day.  In addition, patient        cannot afford Bystolic, so will change to Toprol 100 mg once a day.  3.  Dyslipidemia.  On statin therapy.  Goal LDL less than 70.  Repeat on next visit.  4.  History of aortic dissection.  Working on optimizing blood pressure control.             Electronically Signed by: Mabel Beavers-, Other -Author on December 27, 2020 10:41:54 AM  Electronically Co-signed by: Fernie Kevin MD -Reviewer on January 4, 2021 12:56:16 PM

## 2021-05-14 NOTE — PROGRESS NOTES
Progress Note      Patient Name: Frank Winter   Patient ID: [id number]   Sex: Male   YOB: 1945    Primary Care Provider: Frank Mei MD   Referring Provider: Frank Mei MD    Visit Date: February 9, 2021    Provider: Frank Mei MD   Location: [facility]   Location Address: [address] [place] [address], [address]  [id number]   Location Phone: [phone]          Chief Complaint  · Annual Wellness Exam  · Adult General Male Physical Exam      History Of Present Illness  Frank Winter is a 75 year old /White male who presents for evaluation and treatment of:   The patient is a 75 year old /White male who has come to this office for his Annual Wellness Visit.   His Primary Care Provider is Frank Mei MD. His comprehensive Care Team list, including suppliers, has been updated on the Facesheet. His medical/family history, height, weight, BMI, and blood pressure have been reviewed and are in the chart. The Health Risk Assessment has been completed and scanned in the chart.   Medications are listed in the medication list.   The active problem list includes: HTN (hypertension), Hyperlipemia, Primary osteoarthritis of left hip, and Thyroid nodule   The patient does not have a history of substance use.   Patient reports his diet is adequate.   The Mini-Cog has been administered and is scanned in chart. The results are negative. His cognitive function is without limitation.   A hearing loss screen was completed today and the result is negative.   Patient does not have any risk factors for depression. Patient completed the PHQ-9 today and it has been scanned in the chart. The total score is 1-4.   The Timed Up and Go screen was administered today and the result is negative.   The Ascencio Index of [place] in ADLs indicated full function (score of 6).   A Falls Risk Assessment has been completed, including a review of home fall hazards and medication  review.   Overall, the patient's functional ability is noted by this provider to be within normal limits. His level of safety is noted to be within normal limits. His balance/gait is within normal limits. There have been no falls in the past year. Patient-specific home safety recommendations have been reviewed and a copy has been given to patient.   He admits issues with leaking urine. This happens infrequently and he would like to discuss this further today.   There are no additional risk factors identified.   Living Will/Advanced Directive previously executed and scanned in chart.   Personalized health advice was given to the patient and a written health screening schedule was established; see Plan for details.       Past Medical History  Disease Name Date Onset Notes   Anemia --  --    Arthritis --  --    Colon cancer --  --    Colon Polyps --  --    Essential hypertension --  --    Helicobacter pylori (H. pylori) 2019 --    Hernia --  --    High cholesterol --  --    HTN (hypertension) --  --    Hyperlipemia --  --    Hypertension --  --    Limb Swelling --  --    Primary osteoarthritis of left hip 04/18/2018 --    Shortness of Breath --  --    Thyroid disorder --  --    Thyroid nodule --  --          Past Surgical History  Procedure Name Date Notes   cardiac stents 2011 --    Colonoscopy 2013 2019 --    EGD 2019 --    Hernia 1990 --          Medication List  Name Date Started Instructions   aspirin 81 mg oral tablet,delayed release (/RANDI)  take 1 tablet (81 mg) by oral route once daily   atorvastatin 80 mg oral tablet 07/27/2020 TAKE 1 TABLET AT BEDTIME   Bystolic 10 mg oral tablet 07/22/2020 TAKE 1 TABLET EVERY DAY   lisinopril-hydrochlorothiazide 20-12.5 mg oral tablet 02/09/2021 TAKE 1 TABLET TWICE DAILY   montelukast 10 mg oral tablet  take 1 tablet (10 mg) by oral route once daily in the evening   OMEGA-3-ACID ETHYL ESTERS 1 GM Capsule 10/01/2020 TAKE 2 CAPSULES (2 GRAMS) BY ORAL ROUTE 2 TIMES PER DAY FOR  90 DAYS   pantoprazole 40 mg oral tablet,delayed release (DR/EC) 06/04/2020 take 1 tablet (40 mg) by oral route once daily for 90 days   ProAir HFA 90 mcg/actuation inhalation HFA aerosol inhaler 06/12/2018 inhale 2 puffs (180 mcg) by inhalation route every 4 hours as needed   Ventolin HFA 90 mcg/actuation inhalation HFA aerosol inhaler 11/12/2019 INHALE TWO PUFFS BY MOUTH EVERY 4 HOURS AS NEEDED         Allergy List  Allergen Name Date Reaction Notes   NO KNOWN DRUG ALLERGIES --  --  --        Allergies Reconciled  Family Medical History  Disease Name Relative/Age Notes   Colon Neoplasm, Malignant Brother/25   --    Heart Disease Mother/   Mother   Cancer, Unspecified Brother/   Brother   Family history of colon cancer Brother/   Brother   Family history of heart disease Father/   Father         Social History  Finding Status Start/Stop Quantity Notes   Alcohol Never --/-- --  does not drink   Disabled --  --/-- --  --    lives with spouse --  --/-- --  --    . --  --/-- --  --    Recreational Drug Use Current some day --/-- --  yes   Retired. --  --/-- --  --    Sedentary --  --/-- --  --    Tobacco Current every day --/-- 1.5 PPD current every day smoker, 1.5 packs per day, smoked 31 or more years  1 packs per day, smoked 31 or more years         Immunizations  NameDate Admin Mfg Trade Name Lot Number Route Inj VIS Given VIS Publication   Lygxhcejn94/21/2020 SKB FLUZONE-HIGH DOSE [id number] IM LD 09/21/2020    Comments:    Uyyhqszcatql38/09/2012 MSD PNEUMOVAX 23 [id number] IM RD 10/09/2012 10/09/2012   Comments:    Ncwlogjpv2931/12/2019 MSD PNEUMOVAX 23 [id number] IM  11/12/2019    Comments: Patient tolerated injection well.   Prevnar 1309/30/2016 [facility] PREVNAR 13 [id number] IM LA 09/30/2016 11/24/2015   Comments: Pt. left office in stable condition.   Tdap10/27/2014 [facility] BOOSTRIX [id number] IM RD 10/27/2014 01/24/2012   Comments:          Review of Systems  · Constitutional  o Denies  o : night sweats  · Eyes  o Denies  o :  "double vision, blurred vision  · HENT  o Denies  o : vertigo, recent head injury  · Breasts  o Denies  o : abnormal changes in breast size, additional breast symptoms except as noted in the HPI  · Cardiovascular  o Denies  o : chest pain, irregular heart beats  · Respiratory  o Denies  o : shortness of breath, productive cough  · Gastrointestinal  o Denies  o : nausea, vomiting  · Genitourinary  o Denies  o : dysuria, urinary retention  · Integument  o Denies  o : hair growth change, new skin lesions  · Neurologic  o Denies  o : altered mental status, seizures  · Musculoskeletal  o Denies  o : joint swelling, limitation of motion  · Endocrine  o Denies  o : cold intolerance, heat intolerance  · Heme-Lymph  o Denies  o : petechiae, lymph node enlargement or tenderness  · Allergic-Immunologic  o Denies  o : frequent illnesses      Vitals  Date Time BP Position Site L\R Cuff Size HR RR TEMP (F) WT  HT  BMI kg/m2 BSA m2 O2 Sat FR L/min FiO2 HC       02/09/2021 02:37 /78 Sitting    78 - R 18 96.9 231lbs 9oz 5'  10\" 33.23 2.28 96 %  21%          Physical Examination  · Head and Face  o Head  o : normocephalic, atraumatic, non tender, no palpable masses or nodules.  o Face  o : no facial lesions  · Eyes  o Vision  o : Acuity: grossly normal at distance, Conjuntivae: Normal, Sclerae white  · Respiratory  o Auscultation of Lungs  o : normal breath sounds throughout  · Cardiovascular  o Heart  o : Regular rate and rhythm, Normal S1,S2   · Psychiatric  o Mood and Affect  o : normal mood and affect          Assessment  · Screening for alcoholism     V79.1/Z13.89  · Screening for depression     V79.0/Z13.89  · Screening for colon cancer     V76.51/Z12.11  · Annual physical exam     V70.0/Z00.00  · Urinary tract infection     599.0/N39.0  · Encounter for Medicare annual wellness exam     V70.0/Z00.00  · General Medical Exam, Adult (CPE)     V70.0/Z00.00       thyroid ultrasound  PSA on next visit.  ua/culture today..c/o " frequency.  derm referrel for full skin exam.      of note he is also on Norvasc 5 mg.     he states he had a low dose CT lung scan... do not see results..will look deeper into this and try to find the scan.       Plan  · Orders  o Annual alcohol screening using the AUDIT-C tool, 15 minutes [facility] (13902, ) - V79.1/Z13.89 - 02/09/2021  o Negative alcohol screening () - V79.1/Z13.89 - 02/09/2021  o Annual depression screening using the PHQ-9 tool, 15 minutes (14843, ) - V79.0/Z13.89 - 02/09/2021  o ACO-18: Negative screen for clinical depression using a standardized tool () - V79.0/Z13.89 - 02/09/2021  o Falls Risk Assessment Completed (3288F) - V70.0/Z00.00 - 02/09/2021  o Brief hearing screening (written) [facility] () - V70.0/Z00.00 - 02/09/2021  o Annual Wellness Visit-includes a Personalized Prevention Plan of Service (PPS), SUBSEQUENT VISIT (Medicare) () - V70.0/Z00.00 - 02/09/2021  o Presence or absence of urinary incontinence assessed (ROBERTO) (1090F) - V70.0/Z00.00 - 02/09/2021  o ACO-15: Pneumococcal Vaccine Administered or Previously Received [facility] (4040F) - V70.0/Z00.00 - 02/09/2021   P13 2016 P23 2019  o ACO-14: Influenza immunization administered or previously received [facility] () - V70.0/Z00.00 - 02/09/2021 9/2020  o ACO-19: Colorectal cancer screening results documented and reviewed (3017F) - V76.51/Z12.11 - 02/09/2021   2019  o ACO-13: Fall Risk Screening with no falls in past year or only one fall without injury in the past year (1101F) - V70.0/Z00.00 - 02/09/2021  o ACO-39: Current medications updated and reviewed (1159F, ) - - 02/09/2021  o Urinalysis with Reflex Microscopy (73964) - 599.0/N39.0 - 02/09/2021  o Urine Culture (Clean Catch) [facility] (54382) - 599.0/N39.0 - 02/09/2021  · Medications  o prednisone 20 mg oral tablet   SIG: take 2 tablet today and then one tablet by oral route once daily x 4 days   DISP: (6) tablets with 0 refills  Discontinued on 02/09/2021      o Spiriva Respimat 2.5 mcg/actuation inhalation mist   SIG: inhale 2 puffs (5 mcg) by inhalation route once daily at the same time each day   DISP: (3) Box with 0 refills  Discontinued on 02/09/2021     o Voltaren 1 % topical gel   SIG: apply 2 grams to the affected area(s) by topical route 3 times per day   DISP: (1) 100 gm tube with 0 refills  Discontinued on 02/09/2021     o Medications have been Reconciled  o Transition of Care or Provider Policy  · Instructions  o Audit-C Questionnaire completed and scanned into the EMR under the designated folder within the patient's documents.  o Depression Screen completed and scanned into the EMR under the designated folder within the patient's documents.  o Reviewed health maintenance flowsheet and updated information. Orders were placed and/or patient's response was documented.  o Health Risk Assessment has been reviewed with the patient.  o Written health screening schedule for next 5-10 years was established with patient; information scanned in chart and given/mailed to patient.  o Fall prevention methods discussed and a copy of recommendations given/mailed to patient.  o Patient was educated/instructed on their diagnosis, treatment and medications prior to discharge from the clinic today.  · Disposition  o Call or Return if symptoms worsen or persist.  o Care Transition            Electronically Signed by: Frank Mei MD -Author on February 11, 2021 01:20:40 PM

## 2021-05-15 VITALS
TEMPERATURE: 98.9 F | DIASTOLIC BLOOD PRESSURE: 70 MMHG | WEIGHT: 227 LBS | RESPIRATION RATE: 16 BRPM | HEIGHT: 70 IN | SYSTOLIC BLOOD PRESSURE: 132 MMHG | HEART RATE: 82 BPM | BODY MASS INDEX: 32.5 KG/M2

## 2021-05-15 VITALS
DIASTOLIC BLOOD PRESSURE: 72 MMHG | OXYGEN SATURATION: 99 % | TEMPERATURE: 97.9 F | BODY MASS INDEX: 32.69 KG/M2 | HEIGHT: 70 IN | HEART RATE: 79 BPM | SYSTOLIC BLOOD PRESSURE: 129 MMHG | WEIGHT: 228.31 LBS

## 2021-05-15 VITALS
HEIGHT: 70 IN | WEIGHT: 201.31 LBS | SYSTOLIC BLOOD PRESSURE: 122 MMHG | DIASTOLIC BLOOD PRESSURE: 62 MMHG | BODY MASS INDEX: 28.82 KG/M2 | OXYGEN SATURATION: 98 % | HEART RATE: 64 BPM | TEMPERATURE: 97.7 F

## 2021-05-15 VITALS
HEIGHT: 70 IN | RESPIRATION RATE: 19 BRPM | WEIGHT: 228.19 LBS | TEMPERATURE: 98.1 F | DIASTOLIC BLOOD PRESSURE: 66 MMHG | BODY MASS INDEX: 32.67 KG/M2 | HEART RATE: 75 BPM | OXYGEN SATURATION: 96 % | SYSTOLIC BLOOD PRESSURE: 119 MMHG

## 2021-05-15 VITALS
DIASTOLIC BLOOD PRESSURE: 76 MMHG | WEIGHT: 227 LBS | HEIGHT: 70 IN | HEART RATE: 62 BPM | SYSTOLIC BLOOD PRESSURE: 126 MMHG | BODY MASS INDEX: 32.5 KG/M2

## 2021-05-15 VITALS
WEIGHT: 197.06 LBS | RESPIRATION RATE: 12 BRPM | DIASTOLIC BLOOD PRESSURE: 68 MMHG | SYSTOLIC BLOOD PRESSURE: 123 MMHG | OXYGEN SATURATION: 96 % | HEART RATE: 68 BPM | HEIGHT: 70 IN | BODY MASS INDEX: 28.21 KG/M2

## 2021-05-15 VITALS
TEMPERATURE: 98 F | WEIGHT: 201.37 LBS | DIASTOLIC BLOOD PRESSURE: 67 MMHG | HEIGHT: 70 IN | OXYGEN SATURATION: 96 % | BODY MASS INDEX: 28.83 KG/M2 | HEART RATE: 71 BPM | SYSTOLIC BLOOD PRESSURE: 122 MMHG

## 2021-05-15 VITALS
SYSTOLIC BLOOD PRESSURE: 128 MMHG | HEIGHT: 70 IN | HEART RATE: 72 BPM | WEIGHT: 202 LBS | DIASTOLIC BLOOD PRESSURE: 78 MMHG | BODY MASS INDEX: 28.92 KG/M2

## 2021-05-15 VITALS
OXYGEN SATURATION: 98 % | HEIGHT: 70 IN | DIASTOLIC BLOOD PRESSURE: 60 MMHG | HEART RATE: 67 BPM | WEIGHT: 198.25 LBS | TEMPERATURE: 98.1 F | SYSTOLIC BLOOD PRESSURE: 112 MMHG | BODY MASS INDEX: 28.38 KG/M2

## 2021-05-15 VITALS
BODY MASS INDEX: 28.35 KG/M2 | DIASTOLIC BLOOD PRESSURE: 82 MMHG | HEART RATE: 68 BPM | SYSTOLIC BLOOD PRESSURE: 138 MMHG | HEIGHT: 70 IN | WEIGHT: 198 LBS

## 2021-05-16 VITALS
HEIGHT: 70 IN | SYSTOLIC BLOOD PRESSURE: 106 MMHG | WEIGHT: 195 LBS | DIASTOLIC BLOOD PRESSURE: 52 MMHG | HEART RATE: 74 BPM | BODY MASS INDEX: 27.92 KG/M2

## 2021-05-16 VITALS
HEIGHT: 70 IN | BODY MASS INDEX: 28.06 KG/M2 | SYSTOLIC BLOOD PRESSURE: 126 MMHG | DIASTOLIC BLOOD PRESSURE: 70 MMHG | HEART RATE: 68 BPM | WEIGHT: 196 LBS

## 2021-05-16 VITALS
HEART RATE: 91 BPM | WEIGHT: 203.37 LBS | HEIGHT: 70 IN | DIASTOLIC BLOOD PRESSURE: 68 MMHG | TEMPERATURE: 98.5 F | OXYGEN SATURATION: 96 % | BODY MASS INDEX: 29.12 KG/M2 | SYSTOLIC BLOOD PRESSURE: 147 MMHG

## 2021-05-16 VITALS
OXYGEN SATURATION: 97 % | SYSTOLIC BLOOD PRESSURE: 113 MMHG | RESPIRATION RATE: 16 BRPM | BODY MASS INDEX: 27.77 KG/M2 | DIASTOLIC BLOOD PRESSURE: 70 MMHG | HEIGHT: 70 IN | TEMPERATURE: 96.8 F | WEIGHT: 194 LBS | HEART RATE: 80 BPM

## 2021-05-16 VITALS — OXYGEN SATURATION: 97 % | HEIGHT: 70 IN | HEART RATE: 67 BPM | BODY MASS INDEX: 27.77 KG/M2 | WEIGHT: 194 LBS

## 2021-05-16 VITALS
HEIGHT: 70 IN | WEIGHT: 196.31 LBS | TEMPERATURE: 97.9 F | SYSTOLIC BLOOD PRESSURE: 108 MMHG | BODY MASS INDEX: 28.1 KG/M2 | HEART RATE: 83 BPM | DIASTOLIC BLOOD PRESSURE: 64 MMHG | OXYGEN SATURATION: 98 %

## 2021-05-16 VITALS
DIASTOLIC BLOOD PRESSURE: 68 MMHG | RESPIRATION RATE: 16 BRPM | OXYGEN SATURATION: 99 % | WEIGHT: 201 LBS | HEIGHT: 70 IN | HEART RATE: 70 BPM | BODY MASS INDEX: 28.77 KG/M2 | SYSTOLIC BLOOD PRESSURE: 124 MMHG

## 2021-05-16 VITALS — HEART RATE: 80 BPM | BODY MASS INDEX: 28.51 KG/M2 | OXYGEN SATURATION: 94 % | HEIGHT: 70 IN | WEIGHT: 199.12 LBS

## 2021-05-16 VITALS — HEART RATE: 83 BPM | BODY MASS INDEX: 28.06 KG/M2 | HEIGHT: 70 IN | OXYGEN SATURATION: 94 % | WEIGHT: 196 LBS

## 2021-05-16 VITALS — HEIGHT: 70 IN | HEART RATE: 68 BPM | OXYGEN SATURATION: 97 % | BODY MASS INDEX: 28.2 KG/M2 | WEIGHT: 197 LBS

## 2021-05-28 VITALS
OXYGEN SATURATION: 95 % | TEMPERATURE: 97.7 F | SYSTOLIC BLOOD PRESSURE: 141 MMHG | BODY MASS INDEX: 32.57 KG/M2 | TEMPERATURE: 98.8 F | HEART RATE: 61 BPM | BODY MASS INDEX: 32.07 KG/M2 | TEMPERATURE: 98.4 F | RESPIRATION RATE: 20 BRPM | DIASTOLIC BLOOD PRESSURE: 72 MMHG | RESPIRATION RATE: 12 BRPM | DIASTOLIC BLOOD PRESSURE: 74 MMHG | BODY MASS INDEX: 30.24 KG/M2 | SYSTOLIC BLOOD PRESSURE: 142 MMHG | HEART RATE: 66 BPM | OXYGEN SATURATION: 97 % | DIASTOLIC BLOOD PRESSURE: 79 MMHG | WEIGHT: 224 LBS | RESPIRATION RATE: 16 BRPM | HEIGHT: 70 IN | WEIGHT: 211.25 LBS | SYSTOLIC BLOOD PRESSURE: 133 MMHG | HEIGHT: 70 IN | HEIGHT: 70 IN | HEART RATE: 68 BPM | OXYGEN SATURATION: 97 % | WEIGHT: 227.5 LBS

## 2021-05-28 VITALS
WEIGHT: 233 LBS | OXYGEN SATURATION: 97 % | TEMPERATURE: 98.3 F | DIASTOLIC BLOOD PRESSURE: 72 MMHG | HEART RATE: 61 BPM | SYSTOLIC BLOOD PRESSURE: 137 MMHG | RESPIRATION RATE: 14 BRPM | HEIGHT: 70 IN | BODY MASS INDEX: 33.36 KG/M2

## 2021-05-28 VITALS
SYSTOLIC BLOOD PRESSURE: 130 MMHG | WEIGHT: 230 LBS | TEMPERATURE: 98.2 F | RESPIRATION RATE: 15 BRPM | OXYGEN SATURATION: 92 % | HEART RATE: 74 BPM | HEIGHT: 70 IN | BODY MASS INDEX: 32.93 KG/M2 | DIASTOLIC BLOOD PRESSURE: 70 MMHG

## 2021-05-28 NOTE — PROGRESS NOTES
Patient: ZAIN BASS     Acct: [id number]     Report: #[id number]  UNIT #: [id number]     : 1945    Encounter Date:2021  PRIMARY CARE: NÉSTOR MANJARREZ  ***Signed***  --------------------------------------------------------------------------------------------------------------------  Chief Complaint      Encounter Date      Mar 4, 2021            Primary Care Provider      NÉSTOR MANJARREZ            Referring Provider      NÉSTOR MANJARREZ            Patient Complaint      Patient is complaining of      PT here for F/U Issues with [place], Emphysema, COPD, Resp. Failure, Dyspnea            VITALS      Height 5 ft 10 in / 177.8 cm      Weight 233 lbs  / 105.396814 kg      BSA 2.23 m2      BMI 33.4 kg/m2      Temperature 98.3 F / 36.83 C - Temporal      Pulse 61      Respirations 14      Blood Pressure 137/72 Sitting, Left Arm      Pulse Oximetry 97%, room air      Initial Exhaled Nitrous Oxide      Date:  Dec 9, 2019            HPI      The patient is a 75 year old male with history of chronic obstructive pulmonary     disease and tobacco abuse of cigarettes in remission here today due to an issue     with Miller's. The patient states he wears nocturnal oxygen every night however     he received a letter in the mail requesting recertification to document a drop     in his oxygen levels. The patient states he has been trying to wean himself off     oxygen at nighttime, he says he has not used it for the past week. He has not     been monitoring his oxygen levels at home at nighttime to know if he has been     dropping his saturation without the oxygen. Overall the patient states he is     doing well, he denies any chest pain, shortness of breath, purulent sputum or     wheezing. He is supposed to be on Symbicort and spiriva however he states his     pharmacy did not send him spiriva and only gave him an albuterol inhaler. He     states that he uses his albuterol inhaler twice daily in the morning and      evening. He denies any headaches, daytime fatigue, sore throat or changes in     sense of taste or smell. He has had no known sick contacts. He is able to     perform his activities of daily living without difficulty.             A 10 out of 14 point Review of Systems was explored with the patient and is     negative unless otherwise stated in the HPI.            ROS      Constitutional:  Denies: Fatigue, Fever, Weight gain, Weight loss, Chills,     Insomnia, Other      Respiratory/Breathing:  Complains of: Wheezing; Denies: Shortness of air, Cough,    Hemoptysis, Pleuritic pain, Other      Endocrine:  Denies: Polydipsia, Polyuria, Heat/cold intolerance, Diabetes, Other      Eyes:  Denies: Blurred vision, Vision Changes, Other      Ears, nose, mouth, throat:  Denies: Congestion, Dysphagia, Hearing Changes, Nose    Bleeding, Nasal Discharge, Throat pain, Tinnitus, Other      Cardiovascular:  Denies: Chest Pain, Exertional dyspnea, Peripheral Edema,     Palpitations, Syncope, Wake up Gasping for air, Orthopnea, Tachycardia, Other      Gastrointestinal:  Denies: Abdominal pain/cramping, Bloody stools, Constipation,    Diarrhea, Melena, Nausea, Vomiting, Other      Genitourinary:  Denies: Dysuria, Urinary frequency, Incontinence, Hematuria,     Urgency, Other      Musculoskeletal:  Denies: Joint Pain, Joint Stiffness, Joint Swelling, Myalgias,    Other      Hematologic/lymphatic:  DENIES: Lymphadenopathy, Bruising, Bleeding tendencies,     Other      Neurologic:  Denies: Headache, Numbness, Weakness, Seizures, Other      Psychiatric:  Denies: Anxiety, Appropriate Effect, Depression, Other      Sleep:  No: Excessive daytime sleep, Morning Headache?, Snoring, Insomnia?, Stop    breathing at sleep?, Other      Integumentary:  Denies: Rash, Dry skin, Skin Warm to Touch, Other            FAMILY/SOCIAL/MEDICAL HX      Surgical History:  Yes: Abdominal Surgery (HERNIA REPAIR-1990), Bowel Surgery     (COLONOSCOPY), Orthopedic  Surgery ([facility])      Heart - Family Hx:  Father      Cancer/Type - Family Hx:  Brother      Smoking status:  Former smoker (Former smoker (Former smoker (1 ppd x 53 years/     quit 11/19)))      Anticoagulation Therapy:  Yes      Antibiotic Prophylaxis:  No      Medical History:  Yes: Arthritis, Chronic Bronchitis/COPD ( INHALERS), Heart     Attack (2011 W/ONE CARDIAC STENT,  NO C/O CP OR SOA ), Hemorrhoids/Rectal Prob     (HERNIA,ANEMIA,MELENA), High Blood Pressure (CONTROLLED WITH MEDS), Shortness Of    Breath (EMPHYSEMA); No: Asthma, Blood Disease, Chemotherapy/Cancer, Congestive     Heart Failu, Deafness or Ringing Ears, Diabetes, Seizures, Miscellaneous     Medical/oth      Psychiatric History      None            PREVENTION      Hx Influenza Vaccination:  Yes      Date Influenza Vaccine Given:  Feb 28, 2021      Influenza Vaccine Declined:  No      2 or More Falls in Past Year?:  No      Fall Past Year with Injury?:  No      Hx Pneumococcal Vaccination:  Yes      Encouraged to follow-up with:  PCP regarding preventative exams.      Chart initiated by      Linda Ziegler MA            ALLERGIES/MEDICATIONS      Allergies:        Coded Allergies:             NO KNOWN ALLERGIES (Unverified , 3/4/21)      Medications    Last Reconciled on 3/4/21 10:21 by MIRI NELSON      amLODIPine (amLODIPine) 5 Mg Tablet      5 MG PO QDAY, #30 TAB         Reported         3/4/21       Metoprolol Succinate (Metoprolol Succinate) 100 Mg Tab.er.24h      100 MG PO QDAY, #30 TAB.SR.24H         Reported         3/4/21       Budesonide/Formoterol Fumarate (Symbicort 160/4.5 Mcg) 10.2 Gm Inh      2 PUFF INH BID for 90 Days, #3 INH 3 Refills         Prov: [facility]         1/14/21       MDI-Albuterol (Ventolin HFA) 8 Gm Hfa.aer.ad      2 PUFFS INH Q4H PRN for SHORTNESS OF BREATH for 90 Days, #3 MDI 3 Refills         Prov: [facility]         12/11/20       Lisinopril-HCTZ 20-12.5 Mg (Lisinopril-HCTZ 20-12.5 Mg)  1 Each Tablet      1 TAB PO BID, #30 TAB 0 Refills         Reported         12/11/20       Pantoprazole (Protonix) 20 Mg Tablet.dr      40 MG PO HS, #60 TAB 0 Refills         Reported         12/11/20       Budesonide/Formoterol Fumarate (Symbicort 160/4.5 Mcg) 10.2 Gm Inh               Prov: [facility]         10/15/20       Montelukast Sodium (Singulair*) 10 Mg Tab      10 MG PO HS for 90 Days, #90 TAB 3 Refills         Prov: ESPERANZA WOLFE PA-C         8/11/20       Omega-3 Fatty Acids/Fish Oil (Fish Oil 1000 Mg) 1 Each Capsule      1 GM PO BID, #60 CAP 0 Refills         Reported         2/17/20       Aspirin EC (Aspirin EC) 81 Mg Tablet.dr      81 MG PO QDAY, #30 TAB.EC 0 Refills         Reported         5/22/18       Atorvastatin Calcium (Lipitor*) 80 Mg Tablet      80 MG PO HS, #30 TAB 0 Refills         Reported         5/22/18       Omega-3 Fatty Acids/Fish Oil (Fish Oil 1000 Mg) 1,000 Mg Capsule      2000 MG PO BID, CAP         Reported         8/16/13      Current Medications      Current Medications Reviewed 3/4/21            EXAM      Vital Signs Reviewed      Gen: WDWN, Alert, NAD.        HEENT:  PERRL, EOMI.  OP, nares clear, no sinus tenderness.      Neck:  Supple, no JVD, no thyromegaly.      Lymph: No axillary, cervical, supraclavicular lymphadenopathy noted bilaterally.      Chest:  Diminished breath sounds bilaterally with prolonged expiratory phase, no    wheezing or rales or rhonchi.       CV:  RRR, no MGR, pulses 2+, equal.      Abd:  Soft, NT, ND, + BS, no HSM.      EXT:  No clubbing, no cyanosis, no edema, no joint tenderness.       Neuro:  A  Skin: No rashes or lesions.      Vitals      Vitals:             Height 5 ft 10 in / 177.8 cm           Weight 233 lbs  / 105.934532 kg           BSA 2.23 m2           BMI 33.4 kg/m2           Temperature 98.3 F / 36.83 C - Temporal           Pulse 61           Respirations 14           Blood Pressure 137/72 Sitting, Left Arm            Pulse Oximetry 97%, room air            REVIEW      Results Reviewed      PCCS Results Reviewed?:  Yes Prev Lab Results, Yes Prev Radiology Results, Yes     Previous M[facility] Records      Radiographic Results               [facility]                PACS RADIOLOGY REPORT            Patient: ZAIN BASS   Acct: #[id number]   Report: #[id number]            UNIT #: [id number]    DOS: 20 0945      INSURANCE:[employer]   ORDER #:[id number]      LOCATION:[facility]     : 1945            PROVIDERS      ADMITTING:     ATTENDING: [facility]      FAMILY:  NÉSTOR MANJARREZ   ORDERING:  [facility]         OTHER:    DICTATING:  Jeffry Bruce MD            REQ #:[id number]   [facility]      REASON FOR EXAM:        REASON FOR VISIT:  FORMER SMOKER            *******Signed******         PROCEDURE:   CT LOW DOSE CHEST SCREENING             COMPARISON:   [facility], CT, CT LOW DOSE CHEST SCREENING,    2019, 14:59.             REASON FOR SCREENING:   Patient is 75 years of age, asymptomatic, and has a     smoking history of more       than 30 pack years.      SMOKING STATUS:   Former smoker.  Years since quittin                SCREENING VISIT:   Year 2.                   TECHNIQUE:   Axial unenhanced LDCT images from the apices through mid-kidney     were obtained.       Evaluation of solid organs and vascular structures is suboptimal due to the lack    of IV contrast.       Imaging was performed on a [employer] 128 CT scanner.             RADIATION:   CT Dose Index Vol (CTDIvol):    3.085  mGy         Dose Length Product (DLP):    136.3  mGy-cm             DIAGNOSTIC QUALITY:   Satisfactory             LUNG-RADS CLASSIFICATION:   1             FINDINGS:         The central airways are patent.  Moderate changes of emphysema.  Biapical     pleural parenchymal        scarring.  Calcified pulmonary nodules consistent with prior granulomatous     infection.  No       suspicious pulmonary nodules are identified bilaterally.  Descending thoracic     aortic stent graft is       unchanged.  No axillary or mediastinal adenopathy.  The thyroid is normal.      Limited evaluation of       the upper abdomen is unremarkable.  No aggressive appearing lytic or sclerotic     bone lesions are       identified.               CONCLUSION:         1. Lung rads category 1-negative      2. Moderate emphysema.              SEMAJ BUSTOS MD             Electronically Signed and Approved By: SEMAJ BUSTOS MD on 12/28/2020 at 10:37                                  Until signed, this is an unconfirmed preliminary report that may contain      errors and is subject to change.                                              DUNS1:      D:12/28/20 1037            Assessment      Notes      New Medications      * Metoprolol Succinate 100 MG TAB.ER.24H: 100 MG PO QDAY #30      * amLODIPine 5 MG TABLET: 5 MG PO QDAY #30      * TIOTROPIUM BROMIDE (Spiriva Respimat 2.5 mcg/Puff) 4 GM MIST.INHAL          Sample - Qty 2      Discontinued Medications      * TIOTROPIUM BROMIDE (Spiriva Respimat 2.5 mcg/Puff) 4 GM MIST.INHAL          Sample - Qty 2         Instructions: 2 puffs QD         Dx: COPD exacerbation - J44.1      * TIOTROPIUM BROMIDE (Spiriva Respimat 2.5 mcg/Puff) 4 GM MIST.INHAL: 2 PUFFS       INH RTQDAY 90 Days #3      New Diagnostics      * Overnight Oximetry, SCHEDULED PROCEDURE         Dx: Chronic respiratory failure with hypoxia - J96.11      ASSESSMENT:       1. Mild COPD not acutely exacerbated.      2.  Exertional dyspnea.      3. Chronic nocturnal hypoxic respiratory failure on nocturnal oxygen.      4. 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       5. Tobacco abuse of cigarettes in remission with a 60+ pack year smoking     history, now in remission for one year.            PLAN:      1.  Continue Symbicort as prescribed. We will give the patient samples of Spiriva    today. I educated the patient that he needs to be on both spiriva and Symbicort     and if there is an issue with his pharmacy not giving him both these inhalers,     he needs to call our office.       2. Continue albuterol inhaler as needed. I educated the patient on what times to    use his rescue inhaler.        3. We will do a 6 minute walk test in the office today as the patient is complai    inna of some dyspnea on exertion.       4. We will have the patient set up for an overnight oximetry in order to     recertify him for his home nocturnal oxygen.       5.  Continue Singulair 10 mg daily.      6. The patient had a low dose CT scan of the chest in December, he will be due     for repeat in December 2021.        7. I advised the patient to go to the ER, call our office or call her primary     care provider for any new or concerning symptoms.       8. All the patient's questions were answered today and verbalized understanding.          9.  Follow up with Dr. Tee in  June as scheduled.            Electronically signed by MIRI NELSON  03/15/2021 19:59  Electronically signed by Giorgi Hunter  05/01/2021 19:07       Disclaimer: Converted document may not contain table formatting or lab diagrams. Please see [employer] [facility] for the authenticated document.

## 2021-05-28 NOTE — PROGRESS NOTES
Patient: ZAIN BASS     Acct: [id number]     Report: #[id number]  UNIT #: [id number]     : 1945    Encounter Date:2020  PRIMARY CARE: NÉSTOR MANJARREZ  ***Signed***  --------------------------------------------------------------------------------------------------------------------  Chief Complaint      Encounter Date      2020            Primary Care Provider      NÉSTOR MANJARREZ            Referring Provider      NÉSTOR MANJARREZ            Patient Complaint      Patient is complaining of      Patient is here for f/u and results, COPD            VITALS      Height 5 ft 10 in / 177.8 cm      Weight 224 lbs  / 101.033150 kg      BSA 2.19 m2      BMI 32.1 kg/m2      Temperature 98.8 F / 37.11 C - Oral      Pulse 66      Respirations 16      Blood Pressure 133/74 Sitting, Left Arm      Pulse Oximetry 97%, room air      Initial Exhaled Nitrous Oxide      Date:  Dec 9, 2019      Exhaled Nitrous Oxide Results:  58            HPI      The patient is a very pleasant 74 year old white male seen by me as a new     patient in 2019 after a hospitalization where he was seen by the     hospitalist service and treated for chronic obstructive pulmonary disease     exacerbation. The patient was improving when I saw him but still having some     exertional dyspnea and dry cough. He was on Spiriva Respimat and I added     Symbicort 160. He feels like it helped some and his dyspnea and dry cough have     both improved.  He is a former heavy smoking with 60+ pack year history and has     remained off of cigarettes since I saw him last. I did work up including     pulmonary function test, 6 minute walk test, alpha 1 antitrypsin testing and     checked an IgE level. He is here to review these today.             I reviewed the Review of Systems, medical, surgical and family history and agree    with those as entered.      Copies To:   Giorgi Hunter      Constitutional:  Denies: Fatigue,  Fever, Weight gain, Weight loss, Chills,     Insomnia, Other      Respiratory/Breathing:  Complains of: Shortness of air, Cough; Denies: Wheezing,    Hemoptysis, Pleuritic pain, Other      Endocrine:  Denies: Polydipsia, Polyuria, Heat/cold intolerance, Diabetes, Other      Eyes:  Denies: Blurred vision, Vision Changes, Other      Ears, nose, mouth, throat:  Denies: Congestion, Dysphagia, Hearing Changes, Nose    Bleeding, Nasal Discharge, Throat pain, Tinnitus, Other      Cardiovascular:  Denies: Chest Pain, Exertional dyspnea, Peripheral Edema,     Palpitations, Syncope, Wake up Gasping for air, Orthopnea, Tachycardia, Other      Gastrointestinal:  Denies: Abdominal pain/cramping, Bloody stools, Constipation,    Diarrhea, Melena, Nausea, Vomiting, Other      Genitourinary:  Denies: Dysuria, Urinary frequency, Incontinence, Hematuria,     Urgency, Other      Musculoskeletal:  Denies: Joint Pain, Joint Stiffness, Joint Swelling, Myalgias,    Other      Hematologic/lymphatic:  DENIES: Lymphadenopathy, Bruising, Bleeding tendencies,     Other      Neurologic:  Denies: Headache, Numbness, Weakness, Seizures, Other      Psychiatric:  Denies: Anxiety, Appropriate Effect, Depression, Other      Sleep:  No: Excessive daytime sleep, Morning Headache?, Snoring, Insomnia?, Stop    breathing at sleep?, Other      Integumentary:  Denies: Rash, Dry skin, Skin Warm to Touch, Other            FAMILY/SOCIAL/MEDICAL HX      Surgical History:  Yes: Abdominal Surgery (HERNIA REPAIR-1990), Bowel Surgery     (COLONOSCOPY), Orthopedic Surgery ([facility])      Heart - Family Hx:  Father      Cancer/Type - Family Hx:  Brother (colon)      Is Father Still Living?:  No      Is Mother Still Living?:  No      Social History:  No Tobacco Use, No Alcohol Use, No Recreational Drug use      Smoking status:  Former smoker (1 ppd x 53 years/ quit 11/19)      Anticoagulation Therapy:  Yes (Aspirin)      Antibiotic Prophylaxis:  No      Medical History:   Yes: Allergies, Arthritis, Chronic Bronchitis/COPD (     INHALERS), Heart Attack (2011 W/ONE CARDIAC STENT,  NO C/O CP OR SOA ),     Hemorrhoids/Rectal Prob (HERNIA,ANEMIA,MELENA), High Blood Pressure (CONTROLLED     WITH MEDS), Shortness Of Breath (EMPHYSEMA); No: Asthma, Blood Disease,     Chemotherapy/Cancer, Congestive Heart Failu, Deafness or Ringing Ears, Diabetes,    Seizures, Miscellaneous Medical/oth      Psychiatric History      NONE            PREVENTION      Hx Influenza Vaccination:  Yes      Date Influenza Vaccine Given:  Nov 13, 2019      Influenza Vaccine Declined:  No      2 or More Falls Past Year?:  No      Fall Past Year with Injury?:  No      Hx Pneumococcal Vaccination:  Yes      Encouraged to follow-up with:  PCP regarding preventative exams.      Chart initiated by      Tiana Sampson MA            ALLERGIES/MEDICATIONS      Allergies:        Coded Allergies:             NO KNOWN ALLERGIES (Unverified , 2/17/20)      Medications    Last Reconciled on 2/17/20 14:52 by JOAQUIN FRENCH      Omega-3 Fatty Acids/Fish Oil (Fish Oil 1000 Mg) 1 Each Capsule      1 GM PO BID, #60 CAP 0 Refills         Reported         2/17/20       Montelukast Sodium (Singulair*) 10 Mg Tab      5 MG PO HS, #15 TAB 0 Refills         Prov: Maura Estevez PA-C         2/11/20       Tiotropium Bromide (Spiriva Respimat 2.5 mcg/Puff) 4 Gm Mist.inhal      2 PUFFS INH RTQDAY for 30 Days, #1 MDI 3 Refills         Prov: Muara Estevez PA-C         12/9/19       Budesonide/Formoterol Fumarate (Symbicort 160/4.5 Mcg) 10.2 Gm Inh      2 PUFF INH BID, #1 INH 5 Refills         Prov: Maura Estevez PA-C         12/9/19       MDI-Albuterol (Ventolin HFA) 8 Gm Hfa.aer.ad      2 PUFFS INH Q4H PRN for SHORTNESS OF BREATH, #1 MDI 0 Refills         Reported         11/17/19       amLODIPine (amLODIPine) 2.5 Mg Tablet      5 MG PO QDAY PRN for HYPERTENSION, #60 TAB 0 Refills         Reported         11/17/19       Labetalol Hcl  (Labetalol Hcl*) 100 Mg Tablet      100 MG PO BID, TAB         Reported         5/22/18       Aspirin EC (Aspirin EC) 81 Mg Tablet.dr      81 MG PO QDAY, #30 TAB.EC 0 Refills         Reported         5/22/18       Atorvastatin Calcium (Lipitor*) 80 Mg Tablet      80 MG PO HS, #30 TAB 0 Refills         Reported         5/22/18       Omega-3 Fatty Acids/Fish Oil (Fish Oil 1000 Mg) 1,000 Mg Capsule      2000 MG PO BID, CAP         Reported         8/16/13      Current Medications      Current Medications Reviewed 2/17/20            EXAM      CONSTITUTIONAL: Pleasant male normal conversant.       EYES : Pink conjunctive, no ptosis, PERRL.       ENMT : Nose and ears appear normal, normal dentition, mild posterior pharyngeal     wall erythema, no sinus tenderness. Mallampati classification       Neck: Nontender, no masses, no thyromegaly, no nodules.      Resp : Mildly decreased breath sounds throughout, no wheezes, rhonchi or     crackles, normal work of breathing noted.        CVS  : No carotid bruits, s1s2 nl, RRR, no murmur, rubs or gallop, no peripheral    edema       Chest wall: Normal rise with inspiration, nontender on palpation.      GI   : Abdomen soft, with no masses, no hepatosplenomegaly, no hernias, BS+      MSK  : Normal gait and station, no digital cyanosis or clubbing       Skin : No rashes, ulcerations or lesions, normal turgor and temperature      Neuro: CN II - XII intact, no sensory deficits, DTRs intact and symmetrical, no     motor weakness      Psych: Appropriate affect, A   Vitals      Vitals:             Height 5 ft 10 in / 177.8 cm           Weight 224 lbs  / 101.140472 kg           BSA 2.19 m2           BMI 32.1 kg/m2           Temperature 98.8 F / 37.11 C - Oral           Pulse 66           Respirations 16           Blood Pressure 133/74 Sitting, Left Arm           Pulse Oximetry 97%, room air            REVIEW      Results Reviewed      PCCS Results Reviewed?:  Yes Prev Lab Results, Yes Prev  Radiology Results, Yes     Previous Mecial Records      Radiographic Results               [facility] Diagnostic Img                PACS RADIOLOGY REPORT            Patient: ZAIN BASS   Acct: #[id number]   Report: #[id number]            UNIT #: [id number]    DOS: 19 1545      INSURANCE:[employer]   ORDER #:[id number]      LOCATION:[facility]     : 1945            PROVIDERS      ADMITTING:     ATTENDING: NÉSTOR MANJARREZ      FAMILY:  NONE,MD   ORDERING:  NÉSTOR MANJARREZ         OTHER:    DICTATING:  Jeffry Bruce MD            REQ #:[id number]   [facility]      REASON FOR EXAM:        REASON FOR VISIT:  CURRENT SMOKER            *******Signed******         PROCEDURE:   CT LOW DOSE CHEST SCREENING             COMPARISON:   [facility], CT, CT LOW DOSE CHEST SCREENING,    2018, 13:09.             REASON FOR SCREENING:   Patient is 74 years of age, asymptomatic, and has a     smoking history of more       than 30 pack years.      SMOKING STATUS:   Current smoker.                  SCREENING VISIT:   Year 1.                   TECHNIQUE:   Axial unenhanced LDCT images from the apices through mid-kidney     were obtained.       Evaluation of solid organs and vascular structures is suboptimal due to the lack    of IV contrast.       Imaging was performed on a [employer] 128 CT scanner.             RADIATION:   CT Dose Index Vol (CTDIvol):    3.085  mGy         Dose Length Product (DLP):    139.2.  mGy-cm             DIAGNOSTIC QUALITY:   Satisfactory             FINDINGS:         Moderate emphysematous changes are identified.  No suspicious pulmonary nodules     or masses are       identified.  The thyroid is normal.  Scattered nonenlarged mediastinal lymph     nodes are identified.        There is stent graft repair of descending thoracic aortic aneurysm or     dissection.  Limited        evaluation of the upper abdomen is unremarkable.  No aggressive appearing lytic     or sclerotic bone       lesions are identified.             CONCLUSION:         1. Lung rads category 1.  Recommend screening exam in 1 year.      2. Moderate emphysematous changes.              SEMAJ BUSTOS MD             Electronically Signed and Approved By: SEMAJ BUSTOS MD on 2019 at 15:40                           Until signed, this is an unconfirmed preliminary report that may contain      errors and is subject to change.                                              DUNS1:      D:19 1540      PFT Results      Patient: ZAIN BASS   Acct: #[id number]   Report: #[id number]            UNIT #: [id number]    DOS:       LOCATION:[facility]     : 1945            PROVIDERS      ADMITTING:     FAMILY:  NÉSTOR MANJARREZ         OTHER:       DICTATING:  Giorgi Hunter MD            REASON FOR VISIT:  SOA            *******Signed******                                    [facility]                          [employer] Information Management Services                            [place], [place]  [address]               __________________________________________________________________________             Patient Name:                   Attending Physician:      Zain Bass PA-C             Patient Visit # MR #            Admit Date  Disch Date     Location      [id number]    [id number]      2020                 [facility] -             Date of Birth      1945      __________________________________________________________________________      821 - DIAGNOSTIC REPORT             PULMONARY FUNCTION TEST             SPIROMETRY:      Spirometry shows possible mild obstructive defect.      FEV1/FVC is 66.      FEV1 is 2.64 L, 84% of predicted.      FVC is 4.01 L, 93% of predicted.             BRONCHODILATOR RESPONSE:      There is no significant response to  bronchodilator administration.             LUNG VOLUMES:      Lung volumes show hyperinflation and air trapping.      Total lung capacity is 8.62 L, 122% of predicted.      Residual volume is 4.51 L, 178% of predicted.             DIFFUSION:      Diffusion capacity is borderline normal, 80% of predicted.             FLOW VOLUME LOOP:      Flow volume loop is suggestive of mild obstructive process.             CONCLUSION:      Low FEV1/FVC with normal FEV1 and FVC with hyperinflation and air trapping.      Borderline-normal diffusion capacity.  It suggests mild obstructive disease,      likely chronic bronchitis phenotype.  Please correlate clinically.             To be electronically signed in [employer]      [id number] GIORGI HUNTER M.D.             NK:kathy      D:  01/15/2020 16:44      T:  01/15/2020 19:40      #[id number]             Until signed, this is an unconfirmed preliminary report that may contain      errors and is subject to change.                   Until signed, this is an unconfirmed preliminary report that may contain      errors and is subject to change.                     <Electronically signed by Giorgi Hunter MD>                01/22/20 1534               Giorgi Hunter MD:JTJ      D:01/15/20 1644            Assessment      Chronic hypoxemic respiratory failure - J96.11            Notes      New Medications      * Omega-3 Fatty Acids/Fish Oil (Fish Oil 1000 Mg) 1 EACH CAPSULE: 1 GM PO BID       #60      Renewed Medications      * Budesonide/Formoterol Fumarate (Symbicort 160/4.5 Mcg) 10.2 GM INH: 2 PUFF INH      BID #1      * TIOTROPIUM BROMIDE (Spiriva Respimat 2.5 mcg/Puff) 4 GM MIST.INHAL: 2 PUFFS       INH RTQDAY 30 Days #1      Changed Medications      * Montelukast Sodium (Singulair*) 10 MG TAB:         From: 5 MG PO HS #15         To: 10 MG PO HS #30      Discontinued Medications      * Budesonide/Formoterol Fumarate (Symbicort  160/4.5 Mcg) 10.2 GM INH          Sample - Qty 1      New Diagnostics      * Overnight Oximetry, Routine         Dx: Chronic hypoxemic respiratory failure - J96.11      ASSESSMENT:      1. Mild chronic obstructive pulmonary disease without acute exacerbation.       2. Former heavy tobacco abuse of cigarettes with 60+ pack year smoking history     now in remission for 3-4 months.       3. Exertional dyspnea improving.       4. Cough improving.       5. Chronic hypoxic respiratory failure on nocturnal oxygen.       6. 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             PLAN:      1. I have discussed with the patient regarding all his test results including     pulmonary function test that appeared to show mild chronic obstructive pulmonary    disease only, it did not show significant bronchodilator response. He had an     elevated IgE level a little over 300 and I have already started him on     Singulair. It seems there was an error made with a prescription sent and he     should be taking 10 mg 1 tablet nightly not 5 mg nightly. I have sent in a     corrected prescription.       2. He had a normal genotype of MM on alpha 1 antitrypsin testing.       3. He did not have any desaturations on his 6 minute walk test.       4. He is on supplemental oxygen with sleep and is asking about whether he still     needs to be on this. He was started on this after his hospitalization in     November 2019. I will do overnight oximetry on room air to see if he still needs    oxygen with sleep.       5. He also had low dose CT scan of the chest ordered by Dr. Mei and had     moderate emphysema but no suspicious pulmonary nodules. Continue with annual low    dose lung cancer screening CT scans.       6. He is up to date on flu and pneumonia vaccines through his primary care     provider.       7. I congratulated him on remaining off of cigarettes.       8. Continue Symbicort 160 and Spiriva Respimat 2.5 mcg with albuterol as needed.           9. Follow up in 4 months, sooner if needed.            Patient Education      Patient Education Provided:  COPD, How to use an Inhaler      Time Spent:  > 50% /[facility]            Electronically signed by ESPERANZA WOLFE PA-C  03/04/2020 14:11       Disclaimer: Converted document may not contain table formatting or lab diagrams. Please see [employer] for the authenticated document.

## 2021-05-28 NOTE — PROGRESS NOTES
Patient: ZAIN BASS     Acct: [id number]     Report: #[id number]  UNIT #: [id number]     : 1945    Encounter Date:2019  PRIMARY CARE: NÉSTOR MANJARREZ  ***Signed***  --------------------------------------------------------------------------------------------------------------------  Chief Complaint      Encounter Date      Dec 9, 2019            Primary Care Provider      NÉSTOR MANJARREZ            Referring Provider      NÉSTOR MANJARREZ            Patient Complaint      Patient is complaining of      Pt here for [facility] follow up/COPD            VITALS      Height 5 ft 10 in / 177.8 cm      Weight 211 lbs 4 oz / 95.484077 kg      BSA 2.14 m2      BMI 30.3 kg/m2      Temperature 98.4 F / 36.89 C - Oral      Pulse 61      Respirations 12      Blood Pressure 142/72 Sitting, Left Arm      Pulse Oximetry 97%, room air      Initial Exhaled Nitrous Oxide      Date:  Dec 9, 2019      Exhaled Nitrous Oxide Results:  58            HPI      The patient is a 74 year old white male referred to us a new patient after being    hospitalized at [facility] and seen by the hospitalist group in     mid 2019.  He was admitted with worsening dyspnea, cough and wheezing.     He was treated with antibiotics, steroids and breathing treatments and gradually    improved. He was discharged on supplemental oxygen to use with sleep only and     has been using it as prescribed. He was on spiriva  Handihaler but reports his     primary care provider Dr. Manjarrez recently changed him to Spiriva Respimat, he is    not sure which dose of Spiriva Respimat is on because he has not started using     it until he finishes using the spiriva  Handihaler. He is still having dyspnea     on exertion. He sometimes has dry cough.  He denies wheezing, hemoptysis, fever     or chills. He reports he can walk between 400-500 feet without having to stop     and rest. He is a former very heavy smoker with 60+ pack year smoking  history     but quit smoking in the past 2-3 weeks when he was discharged from the hospital,    he quit cold turkey. He had pulmonary function test done in 2015 that were     consistent with asthma chronic obstructive pulmonary disease overlap syndrome     and he had moderate obstruction with FEV1 of 69%. He was not aware that he had     asthma before but says he was diagnosed with chronic obstructive pulmonary     disease 4-5 years ago.             I reviewed his Review of Systems, medical, surgical and family history and agree    with those as entered.      Copies To:   Giorgi Hunter      Constitutional:  Denies: Fatigue, Fever, Weight gain, Weight loss, Chills,     Insomnia, Other      Respiratory/Breathing:  Complains of: Shortness of air; Denies: Wheezing, Cough,    Hemoptysis, Pleuritic pain, Other      Endocrine:  Denies: Polydipsia, Polyuria, Heat/cold intolerance, Diabetes, Other      Eyes:  Denies: Blurred vision, Vision Changes, Other      Ears, nose, mouth, throat:  Complains of: Nose Bleeding, Nasal Discharge;     Denies: Congestion, Dysphagia, Hearing Changes, Throat pain, Tinnitus, Other      Cardiovascular:  Complains of: Exertional dyspnea; Denies: Chest Pain,     Peripheral Edema, Palpitations, Syncope, Wake up Gasping for air, Orthopnea,     Tachycardia, Other      Gastrointestinal:  Denies: Abdominal pain/cramping, Bloody stools, Constipation,    Diarrhea, Melena, Nausea, Vomiting, Other      Genitourinary:  Denies: Dysuria, Urinary frequency, Incontinence, Hematuria,     Urgency, Other      Musculoskeletal:  Denies: Joint Pain, Joint Stiffness, Joint Swelling, Myalgias,    Other      Hematologic/lymphatic:  DENIES: Lymphadenopathy, Bruising, Bleeding tendencies,     Other      Neurologic:  Denies: Headache, Numbness, Weakness, Seizures, Other      Psychiatric:  Denies: Anxiety, Appropriate Effect, Depression, Other      Sleep:  Yes: Snoring; No: Excessive daytime sleep, Morning  Headache?, Insomnia?,    Stop breathing at sleep?, Other      Integumentary:  Denies: Rash, Dry skin, Skin Warm to Touch, Other            FAMILY/SOCIAL/MEDICAL HX      Surgical History:  Yes: Abdominal Surgery (HERNIA REPAIR-1990), Bowel Surgery     (COLONOSCOPY), Orthopedic Surgery ([facility])      Heart - Family Hx:  Father      Cancer/Type - Family Hx:  Brother (colon)      Is Father Still Living?:  No      Is Mother Still Living?:  No      Social History:  Tobacco Use; No Alcohol Use, No Recreational Drug use      Smoking status:  Current every day smoker (1 ppd x 53 years/ quit 3 weeks ago)      Anticoagulation Therapy:  Yes (Aspirin)      Antibiotic Prophylaxis:  No      Medical History:  Yes: Allergies, Arthritis, Chronic Bronchitis/COPD ( IN    HALERS), Heart Attack (2011 W/ONE CARDIAC STENT,  NO C/O CP OR SOA ),     Hemorrhoids/Rectal Prob (HERNIA,ANEMIA,MELENA), High Blood Pressure (CONTROLLED     WITH MEDS), Shortness Of Breath (EMPHYSEMA); No: Asthma, Blood Disease,     Chemotherapy/Cancer, Congestive Heart Failu, Deafness or Ringing Ears, Diabetes,    Seizures, Miscellaneous Medical/oth      Psychiatric History      None            PREVENTION      Hx Influenza Vaccination:  Yes      Date Influenza Vaccine Given:  Nov 13, 2019      Influenza Vaccine Declined:  No      2 or More Falls Past Year?:  No      Fall Past Year with Injury?:  No      Hx Pneumococcal Vaccination:  Yes      Encouraged to follow-up with:  PCP regarding preventative exams.      Chart initiated by      Dianelys Villarreal MA            ALLERGIES/MEDICATIONS      Allergies:        Coded Allergies:             NO KNOWN ALLERGIES (Unverified , 12/9/19)      Medications    Last Reconciled on 12/9/19 15:06 by JOAQUIN FRENCH      Budesonide/Formoterol Fumarate (Symbicort 160/4.5 Mcg) 10.2 Gm Inh      2 PUFF INH BID, #1 INH 5 Refills         Prov: Maura Estevez PA-C         12/9/19       Tiotropium (Spiriva) 18 Mcg Inh      1 PUFFS INH RTQDAY,  #1 INH 0 Refills         Prov: Antonio Gr         11/20/19       predniSONE* (predniSONE*) 20 Mg Tablet      40 MG PO QDAY for 4 Days, #8 TAB         Prov: Antonio Gr         11/20/19       MDI-Albuterol (Ventolin HFA) 8 Gm Hfa.aer.ad      2 PUFFS INH Q4H PRN for SHORTNESS OF BREATH, #1 MDI 0 Refills         Reported         11/17/19       amLODIPine (amLODIPine) 2.5 Mg Tablet      5 MG PO QDAY PRN for HYPERTENSION, #60 TAB 0 Refills         Reported         11/17/19       Labetalol Hcl (Labetalol Hcl*) 100 Mg Tablet      100 MG PO BID, TAB         Reported         5/22/18       Aspirin EC (Aspirin EC) 81 Mg Tablet.dr      81 MG PO QDAY, #30 TAB.EC 0 Refills         Reported         5/22/18       Atorvastatin Calcium (Lipitor*) 80 Mg Tablet      80 MG PO HS, #30 TAB 0 Refills         Reported         5/22/18       Omega-3 Fatty Acids/Fish Oil (Fish Oil 1000 Mg) 1,000 Mg Capsule      2000 MG PO BID, CAP         Reported         8/16/13      Current Medications      Current Medications Reviewed 12/9/19            EXAM      CONSTITUTIONAL: Pleasant  normal conversant.       EYES : Pink conjunctive, no ptosis, PERRL.       ENMT : Nose and ears appear normal, normal dentition, mild posterior pharyngeal     wall erythema, no sinus tenderness. Mallampati classification       Neck: Nontender, no masses, no thyromegaly, no nodules.      Resp : Mildly decreased breath sounds throughout, no wheezes, rhonchi or     crackles, normal work of breathing noted.        CVS  : No carotid bruits, s1s2 nl, RRR, no murmur, rubs or gallop, no peripheral    edema       Chest wall: Normal rise with inspiration, nontender on palpation.      GI   : Abdomen soft, with no masses, no hepatosplenomegaly, no hernias, BS+      MSK  : Normal gait and station, no digital cyanosis or clubbing       Skin : No rashes, ulcerations or lesions, normal turgor and temperature      Neuro: CN II - XII intact, no sensory deficits, DTRs intact and symmetrical,  no     motor weakness      Psych: Appropriate affect, A   Vitals      Vitals:             Height 5 ft 10 in / 177.8 cm           Weight 211 lbs 4 oz / 95.066687 kg           BSA 2.14 m2           BMI 30.3 kg/m2           Temperature 98.4 F / 36.89 C - Oral           Pulse 61           Respirations 12           Blood Pressure 142/72 Sitting, Left Arm           Pulse Oximetry 97%, room air            REVIEW      Results Reviewed      PCCS Results Reviewed?:  Yes Prev Lab Results, Yes Prev Radiology Results, Yes     Previous Mecial Records      Lab Results      I personally reviewed the patient's most recent pulmonary consultation, progress    notes and discharge summary.            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            Notes      New Medications      * Budesonide/Formoterol Fumarate (Symbicort 160/4.5 Mcg) 10.2 GM INH: 2 PUFF INH      BID #1      * Budesonide/Formoterol Fumarate (Symbicort 160/4.5 Mcg) 10.2 GM INH          Sample - Qty 1      * TIOTROPIUM BROMIDE (Spiriva Respimat 2.5 mcg/Puff) 4 GM MIST.INHAL: 2 PUFFS       INH RTQDAY 30 Days #1      Discontinued Medications      * predniSONE 20 MG TABLET: 40 MG PO QDAY 4 Days #8      * (Nicotine 21 Mg/24 Hr) 21 MG TDSY: 21 MG TRANSDERM QDAY 14 Days #14      * TIOTROPIUM (Spiriva) 18 MCG INH: 1 PUFFS INH RTQDAY #1      * Azithromycin Z-Dank (Zithromax Z-Dank) 250 MG TABLET: 250 MG PO QDAY 4 Days #4      New Diagnostics      * PFT-Comp, PrePost,DLCO,BodyBox, Month         Dx: Asthma-COPD overlap syndrome - J44.9      * 6 Min Walk w O2 Titration Test, Month         Dx: Asthma-COPD overlap syndrome - J44.9      * Alpha 1 Antitrypsin , Routine         Dx: Asthma-COPD overlap syndrome - J44.9      * Immunoglobulin  E (I, Week         Dx: Asthma-COPD overlap syndrome - J44.9      ASSESSMENT:      1. Asthma chronic obstructive pulmonary disease overlap  syndrome with recent     acute exacerbation resolving.       2. Former heavy tobacco abuse of cigarettes with 60+ pack year smoking history     now in remission for 3 weeks.       3. Exertional dyspnea.       4. Cough.       5. Chronic hypoxic respiratory failure on nocturnal oxygen.             PLAN:      1. I have discussed with the patient regarding doing new baseline pulmonary     function test and 6 minute walk test for further evaluation. I will also do     alpha 1 antitrypsin testing today.       2. I discussed with the patient that he likely has asthma chronic obstructive     pulmonary disease overlap syndrome and that he had an elevated FENO of 58 today     consistent with high level of  eosinophilic airway inflammation.       3. The patient is not currently optimized on inhalers as he is on LAMA therapy     alone. I will add LABA/ICS therapy by starting Symbicort 160 2 puffs twice     daily. I have given him samples and showed him how to use this today. We will     check with his pharmacy to find out which dose of Spiriva Respimat he is on. He     will start that 2 puffs once daily and discontinue spiriva  Handihaler.       4. I will check an IgE level now.       5. We discussed doing annual low dose lung cancer screening CT scan as he     qualifies for this with his heavy smoking history. His last CT scan of the chest    was done in April 2018. He reports his primary care provider Dr. Mei has just    scheduled him for a low dose CT scan of the chest later this month. We will     review that at his next office visit.       6. I counseled the patient on smoking cessation for 4 minutes and offered     nicotine replacement therapy or  pharmacotherapy and he declines.       7. He is up to date on flu and pneumonia vaccines.       8. Follow up in 2 months to discuss test results, sooner if needed.            Patient Education      Tobacco Cessation Counseling:  for 3 - 10 minutes      Patient Education  Provided:  COPD, How to use an Inhaler, Smoking Cessation      Time Spent:  > 50% /Coord Care            Electronically signed by ESPERANZA WOLFE PA-C  01/02/2020 09:36       Disclaimer: Converted document may not contain table formatting or lab diagrams. Please see [employer] for the authenticated document.

## 2021-05-28 NOTE — PROGRESS NOTES
Patient: ZAIN BASS     Acct: [id number]     Report: #[id number]  UNIT #: [id number]     : 1945    Encounter Date:2020  PRIMARY CARE: NÉSTOR MANJARREZ  ***Signed***  --------------------------------------------------------------------------------------------------------------------  Chief Complaint      Encounter Date      Dec 11, 2020            Primary Care Provider      NÉSTOR MANJARREZ            Referring Provider      NÉSTOR MANJARREZ            Patient Complaint      Patient is complaining of      PT here today for F/U, COPD            VITALS      Height 5 ft 10 in / 177.8 cm      Weight 230 lbs  / 104.281348 kg      BSA 2.22 m2      BMI 33.0 kg/m2      Temperature 98.2 F / 36.78 C - Temporal      Pulse 74      Respirations 15      Blood Pressure 130/70 Sitting, Right Arm      Pulse Oximetry 92%, room air      Initial Exhaled Nitrous Oxide      Date:  Dec 9, 2019            HPI      The patient is a 75 year old male patient of JOAQUIN Laura who has a history     of mild COPD, former cigarette smoker who quit back in 2019 who presents for     follow up visit today.  The patient states that since last visit his breathing     is at baseline. The patient states that he will get short of breath that is wors    e with exertion, moderate in severity and improved with rest. The patient     currently denies any cough or wheezing. The patient denies any fever, chills,     night sweats, hemoptysis, purulent sputum production, chest pain, chest     tightness, swollen glands in the head and neck, abdominal pain, nausea, vomiting    or diarrhea.  The patient denies any headaches, myalgias, sore throat, changes     in sense of taste and/or smell or any other coronavirus or flu-like symptoms.      The patient states he has not had to take any antibiotics or steroids since last    visit and denies any hospitalizations since last visit.  The patient states he     is taking Symbicort and Spiriva everyday as  prescribed and uses albuterol     inhaler as needed.  The patient states he is able to perform his ADLs without     difficulty.            I have personally reviewed the review of systems, past family, social, surgical     and medical histories and I agree with those as entered in the chart.      Copies To:   Guero Tee      Constitutional:  Denies: Fatigue, Fever, Weight gain, Weight loss, Chills,     Insomnia, Other      Respiratory/Breathing:  Complains of: Shortness of air, Cough; Denies: Wheezing,    Hemoptysis, Pleuritic pain, Other      Endocrine:  Denies: Polydipsia, Polyuria, Heat/cold intolerance, Diabetes, Other      Eyes:  Denies: Blurred vision, Vision Changes, Other      Ears, nose, mouth, throat:  Denies: Congestion, Dysphagia, Hearing Changes, Nose    Bleeding, Nasal Discharge, Throat pain, Tinnitus, Other      Cardiovascular:  Denies: Chest Pain, Exertional dyspnea, Peripheral Edema,     Palpitations, Syncope, Wake up Gasping for air, Orthopnea, Tachycardia, Other      Gastrointestinal:  Denies: Abdominal pain/cramping, Bloody stools, Constipation,    Diarrhea, Melena, Nausea, Vomiting, Other      Genitourinary:  Denies: Dysuria, Urinary frequency, Incontinence, Hematuria,     Urgency, Other      Musculoskeletal:  Denies: Joint Pain, Joint Stiffness, Joint Swelling, Myalgias,    Other      Hematologic/lymphatic:  DENIES: Lymphadenopathy, Bruising, Bleeding tendencies,     Other      Neurologic:  Denies: Headache, Numbness, Weakness, Seizures, Other      Psychiatric:  Denies: Anxiety, Appropriate Effect, Depression, Other      Sleep:  No: Excessive daytime sleep, Morning Headache?, Snoring, Insomnia?, Stop    breathing at sleep?, Other      Integumentary:  Denies: Rash, Dry skin, Skin Warm to Touch, Other            FAMILY/SOCIAL/MEDICAL HX      Surgical History:  Yes: Abdominal Surgery (HERNIA REPAIR-1990), Bowel Surgery     (COLONOSCOPY), Orthopedic Surgery ([facility])      Heart -  Family Hx:  Father      Cancer/Type - Family Hx:  Brother      Smoking status:  Former smoker (Former smoker (1 ppd x 53 years/ quit 11/19))      Anticoagulation Therapy:  Yes      Antibiotic Prophylaxis:  No      Medical History:  Yes: Arthritis, Chronic Bronchitis/COPD ( INHALERS), Heart     Attack (2011 W/ONE CARDIAC STENT,  NO C/O CP OR SOA ), Hemorrhoids/Rectal Prob     (HERNIA,ANEMIA,MELENA), High Blood Pressure (CONTROLLED WITH MEDS), Shortness Of    Breath (EMPHYSEMA); No: Asthma, Blood Disease, Chemotherapy/Cancer, Congestive     Heart Failu, Deafness or Ringing Ears, Diabetes, Seizures, Miscellaneous     Medical/oth      Psychiatric History      none            PREVENTION      Hx Influenza Vaccination:  Yes      Date Influenza Vaccine Given:  Oct 1, 2020      Influenza Vaccine Declined:  No      2 or More Falls in Past Year?:  No      Fall Past Year with Injury?:  No      Hx Pneumococcal Vaccination:  Yes      Encouraged to follow-up with:  PCP regarding preventative exams.      Chart initiated by      Tegan Rebollar CMA            ALLERGIES/MEDICATIONS      Allergies:        Coded Allergies:             NO KNOWN ALLERGIES (Unverified , 12/11/20)      Medications    Last Reconciled on 12/11/20 13:39 by PRETTY OAKES,       Budesonide/Formoterol Fumarate (Symbicort 160/4.5 Mcg) 10.2 Gm Inh      2 PUFF INH BID for 90 Days, #3 INH 3 Refills         Prov: PRETTY OAKES [facility]         12/11/20       MDI-Albuterol (Ventolin HFA) 8 Gm Hfa.aer.ad      2 PUFFS INH Q4H PRN for SHORTNESS OF BREATH for 90 Days, #3 MDI 3 Refills         Prov: PRETTY OAKES [facility]         12/11/20       Tiotropium Bromide (Spiriva Respimat 2.5 mcg/Puff) 4 Gm Mist.inhal      2 PUFFS INH RTQDAY for 90 Days, #3 MDI 3 Refills         Prov: PRETTY OAKES [facility]         12/11/20       Lisinopril-HCTZ 20-12.5 Mg (Lisinopril-HCTZ 20-12.5 Mg) 1 Each Tablet      1 TAB PO BID, #30 TAB 0 Refills         Reported         12/11/20        Pantoprazole (Protonix) 20 Mg Tablet.      40 MG PO HS, #60 TAB 0 Refills         Reported         12/11/20       Nebivolol HCl (BYSTOLIC) 10 Mg Tablet      10 MG PO QDAY, #30 TAB         Reported         12/11/20       Tiotropium Bromide (Spiriva Respimat 2.5 mcg/Puff) 4 Gm Mist.inhal               Prov: PRETTY OAKES [facility]         10/30/20       Budesonide/Formoterol Fumarate (Symbicort 160/4.5 Mcg) 10.2 Gm Inh               Prov: VIOLETTEPRETTY [facility]         10/15/20       Montelukast Sodium (Singulair*) 10 Mg Tab      10 MG PO HS for 90 Days, #90 TAB 3 Refills         Prov: ESPERANZA WOLFE PA-C         8/11/20       Omega-3 Fatty Acids/Fish Oil (Fish Oil 1000 Mg) 1 Each Capsule      1 GM PO BID, #60 CAP 0 Refills         Reported         2/17/20       Aspirin EC (Aspirin EC) 81 Mg Tablet.      81 MG PO QDAY, #30 TAB.EC 0 Refills         Reported         5/22/18       Atorvastatin Calcium (Lipitor*) 80 Mg Tablet      80 MG PO HS, #30 TAB 0 Refills         Reported         5/22/18       Omega-3 Fatty Acids/Fish Oil (Fish Oil 1000 Mg) 1,000 Mg Capsule      2000 MG PO BID, CAP         Reported         8/16/13      Current Medications      Current Medications Reviewed 12/11/20            EXAM      Vital Signs Reviewed.      General:  WDWN, Alert, NAD.      HEENT: PERRL, EOMI.  OP, nares clear, no sinus tenderness.      Neck: Supple, no JVD, no thyromegaly.      Lymph: No axillary, cervical, supraclavicular lymphadenopathy noted bilaterally.      Chest: Mildly decreased breath sounds throughout, no wheezes, rales or rhonchi     appreciated, normal work of breathing noted.  Patient is able to speak full     sentences without difficulty.        CV: RRR, no MGR, pulses 2+, equal.        Abd: Soft, NT, ND, +BS, no HSM.      EXT: No clubbing, no cyanosis, no edema, no joint tenderness.        Neuro:  A  Skin: No rashes or lesions.      Vitals      Vitals:             Height 5 ft 10 in / 177.8 cm            Weight 230 lbs  / 104.863287 kg           BSA 2.22 m2           BMI 33.0 kg/m2           Temperature 98.2 F / 36.78 C - Temporal           Pulse 74           Respirations 15           Blood Pressure 130/70 Sitting, Right Arm           Pulse Oximetry 92%, room air            REVIEW      Results Reviewed      PCCS Results Reviewed?:  Yes Prev Lab Results, Yes Prev Radiology Results, Yes     Previous Mecial Records      Lab Results      I reviewed Yasir Laura last office visit note.            Assessment      Former smoker, stopped smoking in distant past - Z87.891            Notes      New Medications      * Nebivolol HCl (BYSTOLIC) 10 MG TABLET: 10 MG PO QDAY #30      * PANTOPRAZOLE (Protonix) 20 MG TABLET.DR: 40 MG PO HS #60         Instructions: Take on an empty stomach.      * Lisinopril-HCTZ 20-12.5 Mg 1 EACH TABLET: 1 TAB PO BID #30      Renewed Medications      * TIOTROPIUM BROMIDE (Spiriva Respimat 2.5 mcg/Puff) 4 GM MIST.INHAL:         From: 2 PUFFS INH RTQDAY 30 Days #1         To: 2 PUFFS INH RTQDAY 90 Days #3      * MDI-Albuterol (Ventolin HFA) 8 GM HFA.AER.AD:         From: 2 PUFFS INH Q4H PRN SHORTNESS OF BREATH #1         To: 2 PUFFS INH Q4H PRN SHORTNESS OF BREATH 90 Days #3      * Budesonide/Formoterol Fumarate (Symbicort 160/4.5 Mcg) 10.2 GM INH:         From: 2 PUFF INH BID #1         To: 2 PUFF INH BID 90 Days #3      Discontinued Medications      * Budesonide/Formoterol Fumarate (Symbicort 160/4.5 Mcg) 10.2 GM INH          Sample - Qty 2      * TIOTROPIUM BROMIDE (Spiriva Respimat 2.5 mcg/Puff) 4 GM MIST.INHAL          Sample - Qty 2      * TIOTROPIUM BROMIDE (Spiriva Respimat 2.5 mcg/Puff) 4 GM MIST.INHAL          Sample - Qty 2      * Budesonide/Formoterol Fumarate (Symbicort 160/4.5 Mcg) 10.2 GM INH          Sample - Qty 2         Instructions: 2 puffs BID         Dx: COPD exacerbation - J44.1      * TIOTROPIUM BROMIDE (Spiriva Respimat 2.5 mcg/Puff) 4 GM MIST.INHAL          Sample - Qty  2         Instructions: 2 puffs QD         Dx: COPD exacerbation - J44.1      * TIOTROPIUM BROMIDE (Spiriva Respimat 2.5 mcg/Puff) 4 GM MIST.INHAL          Sample - Qty 1         Instructions: 2 puffs QD         Dx: COPD exacerbation - J44.1      New Diagnostics      * LDCT for lung ca screening, SCHEDULED PROCEDURE         Dx: Former smoker, stopped smoking in distant past - Z87.773      ASSESSMENT:       1. Mild COPD without acute exacerbation, patient on triple inhaler therapy.      2.  Exertional dyspnea at baseline.      3. Elevated IgE level, patient on Singulair and Symbicort.      4. Chronic hypoxic respiratory failure on nocturnal oxygen.      5. Tobacco abuse of cigarettes in remission with a 60+ pack year smoking     history, now in remission for one year.            PLAN:      1. The patient to continue Symbicort and Spiriva everyday as prescribed and     rinse his mouth out after each use.      2. The patient to continue albuterol inhaler as needed.      3. The patient to continue oxygen with sleep.      4. The patient is due for repeat low dose chest CT scan and would like to have     this completed at [facility], order placed today.      5. The patient reports he is up-to-date with is flu an pneumonia vaccines.      6. Patient is advised to call the office, 911 or go to the ER with any new or     worsening symptoms.      7. The patient to continue Singulair 10 mg daily.      8.  Follow up with Dr. Tee in six months, sooner if needed.            Patient Education      Patient Education Provided:  COPD      Time Spent:  > 50% /Coord Care            Electronically signed by PRETTY OAKES [facility]  12/16/2020 11:39       Disclaimer: Converted document may not contain table formatting or lab diagrams. Please see [employer] for the authenticated document.

## 2021-05-28 NOTE — PROGRESS NOTES
Patient: ZAIN BASS     Acct: [id number]     Report: #[id number]  UNIT #: [id number]     : 1945    Encounter Date:06/15/2020  PRIMARY CARE: NÉSTOR MANJARREZ  ***Signed***  --------------------------------------------------------------------------------------------------------------------  Chief Complaint      Encounter Date      Josue 15, 2020            Primary Care Provider      NÉSTOR MANJARREZ            Referring Provider      NÉSTOR MANJARREZ            Patient Complaint      Patient is complaining of      4 month F/U, COPD            VITALS      Height 5 ft 10 in / 177.8 cm      Weight 227 lbs 8 oz / 103.515262 kg      BSA 2.20 m2      BMI 32.6 kg/m2      Temperature 97.7 F / 36.5 C - Oral      Pulse 68      Respirations 20      Blood Pressure 141/79 Sitting, Right Arm      Pulse Oximetry 95%, room air      Initial Exhaled Nitrous Oxide      Date:  Dec 9, 2019      Exhaled Nitrous Oxide Results:  58            HPI      The patient is a pleasant 75 year old male known to me from previous office     visits.  I had last seen him in 2020.  He has a history of mild COPD, a     former cigarette smoker, off of cigarettes for about seven months. He had smoked    heavily prior to that.  He does have chronic hypoxic respiratory failure on     nocturnal oxygen. I repeated overnight oximetry for him since his last visit and    he still does require supplemental oxygen with sleep. He tells me his breathing     is doing about the same since his last visit. He denies increased dyspnea, cough    or wheezing.  He denies hemoptysis, fever or chills.  He does admit that he is     not very active and while his wife has been going for walks several times per     day, he typically does not join her.  He reports compliance with Symbicort and     Spiriva, does feel like they help him.  He did have his labetalol changed to     Bystolic by his cardiologist.              I have reviewed ROS, medical, surgical and family  history and agree with those     as entered in the chart.            ROS      Constitutional:  Denies: Fatigue, Fever, Weight gain, Weight loss, Chills,     Insomnia, Other      Respiratory/Breathing:  Complains of: Shortness of air; Denies: Wheezing, Cough,    Hemoptysis, Pleuritic pain, Other      Endocrine:  Denies: Polydipsia, Polyuria, Heat/cold intolerance, Diabetes, Other      Eyes:  Denies: Blurred vision, Vision Changes, Other      Ears, nose, mouth, throat:  Denies: Congestion, Dysphagia, Hearing Changes, Nose    Bleeding, Nasal Discharge, Throat pain, Tinnitus, Other      Cardiovascular:  Denies: Chest Pain, Exertional dyspnea, Peripheral Edema,     Palpitations, Syncope, Wake up Gasping for air, Orthopnea, Tachycardia, Other      Gastrointestinal:  Denies: Abdominal pain/cramping, Bloody stools, Constipation,    Diarrhea, Melena, Nausea, Vomiting, Other      Genitourinary:  Denies: Dysuria, Urinary frequency, Incontinence, Hematuria,     Urgency, Other      Musculoskeletal:  Denies: Joint Pain, Joint Stiffness, Joint Swelling, Myalgias,    Other      Hematologic/lymphatic:  DENIES: Lymphadenopathy, Bruising, Bleeding tendencies,     Other      Neurologic:  Denies: Headache, Numbness, Weakness, Seizures, Other      Psychiatric:  Denies: Anxiety, Appropriate Effect, Depression, Other      Sleep:  No: Excessive daytime sleep, Morning Headache?, Snoring, Insomnia?, Stop    breathing at sleep?, Other      Integumentary:  Denies: Rash, Dry skin, Skin Warm to Touch, Other            FAMILY/SOCIAL/MEDICAL HX      Surgical History:  Yes: Abdominal Surgery (HERNIA REPAIR-1990), Bowel Surgery     (COLONOSCOPY), Orthopedic Surgery (LT)      Heart - Family Hx:  Father      Cancer/Type - Family Hx:  Brother (colon)      Is Father Still Living?:  No      Is Mother Still Living?:  No      Social History:  No Tobacco Use, No Alcohol Use, No Recreational Drug use      Smoking status:  Former smoker (1 ppd x 53 years/  quit 11/19)      Anticoagulation Therapy:  Yes (Aspirin)      Antibiotic Prophylaxis:  No      Medical History:  Yes: Allergies, Arthritis, Chronic Bronchitis/COPD (     INHALERS), Heart Attack (2011 W/ONE CARDIAC STENT,  NO C/O CP OR SOA ),     Hemorrhoids/Rectal Prob (HERNIA,ANEMIA,MELENA), High Blood Pressure (CONTROLLED     WITH MEDS), Shortness Of Breath (EMPHYSEMA); No: Asthma, Blood Disease,     Chemotherapy/Cancer, Congestive Heart Failu, Deafness or Ringing Ears, Diabetes,    Seizures, Miscellaneous Medical/oth      Psychiatric History      none            PREVENTION      Hx Influenza Vaccination:  Yes      Date Influenza Vaccine Given:  Nov 13, 2019      Influenza Vaccine Declined:  No      2 or More Falls Past Year?:  No      Fall Past Year with Injury?:  No      Hx Pneumococcal Vaccination:  Yes      Encouraged to follow-up with:  PCP regarding preventative exams.      Chart initiated by      Tiana Sampson CMA            ALLERGIES/MEDICATIONS      Allergies:        Coded Allergies:             NO KNOWN ALLERGIES (Unverified , 6/15/20)      Medications    Last Reconciled on 6/15/20 10:54 by JOAQUIN FRENCH      Tiotropium Bromide (Spiriva Respimat 2.5 mcg/Puff) 4 Gm Mist.inhal      2 PUFFS INH RTQDAY for 30 Days, #1 MDI 3 Refills         Prov: Maura Estevez PA-C         2/17/20       Budesonide/Formoterol Fumarate (Symbicort 160/4.5 Mcg) 10.2 Gm Inh      2 PUFF INH BID, #1 INH 5 Refills         Prov: Maura Estevez PA-C         2/17/20       Omega-3 Fatty Acids/Fish Oil (Fish Oil 1000 Mg) 1 Each Capsule      1 GM PO BID, #60 CAP 0 Refills         Reported         2/17/20       MDI-Albuterol (Ventolin HFA) 8 Gm Hfa.aer.ad      2 PUFFS INH Q4H PRN for SHORTNESS OF BREATH, #1 MDI 0 Refills         Reported         11/17/19       amLODIPine (amLODIPine) 2.5 Mg Tablet      5 MG PO QDAY PRN for HYPERTENSION, #60 TAB 0 Refills         Reported         11/17/19       Labetalol Hcl (Labetalol Hcl*) 100 Mg  Tablet      100 MG PO BID, TAB         Reported         5/22/18       Aspirin EC (Aspirin EC) 81 Mg Tablet.dr      81 MG PO QDAY, #30 TAB.EC 0 Refills         Reported         5/22/18       Atorvastatin Calcium (Lipitor*) 80 Mg Tablet      80 MG PO HS, #30 TAB 0 Refills         Reported         5/22/18       Omega-3 Fatty Acids/Fish Oil (Fish Oil 1000 Mg) 1,000 Mg Capsule      2000 MG PO BID, CAP         Reported         8/16/13      Current Medications      Current Medications Reviewed 6/15/20            EXAM      GEN-patient appears stated age resting comfortable in no acute distress      Eyes-PERRL,  conjunctiva are normal in appearance extraocular muscles are     intact, no scleral icterus      Nasal-both nares are patent turbinates appear normal no polyps seen no nasal     discharge or ulcerations      Ears-tympanic membranes are normal no erythema no bulging, normal to inspection      Lymphatic-no swollen or enlarged cervical nodes, or axillary node, or femoral     nodes, or supraclavicular nodes      Mouth normal dentition, no erythema no ulcerations oropharynx appears normal no     exudate no evidence of postnasal drip, MP(default value)      Neck-there are no palpable supraclavicular or cervical adenopathy, thyroid is     normal in appearance no apparent nodules, there is no inspiratory or expiratory     stridor      Respiratory-Lungs have mildly decreased breath sounds, no wheezes, rhonchi or     crackles appreciated, normal work of breathing.        Cardiovascular-the heart rate is normal and regular S1 and S2 present with no     murmur or extra heart sounds, there is no JVD or pedal edema present      GI-the abdomen is normal in appearance, bowel sounds present and normal in all     quadrants no hepatosplenomegaly or masses felt      Extremities-no clubbing is present, pulses present in all extremities, capillary    refill time is normal      Musculoskeletal-Normal strength in upper and lower  extremities, inspection shows    no evidence of muscle atrophy      Skin-skin is normal in appearance it is warm and dry, no rashes present, no     evidence of cyanosis, palpation reveals no masses      Neurological-the patient is alert and oriented to time place and person, moves     all 4 extremities, normal gait, normal affect and mood, CN2-12 intact      Psych-normal judgment and insight is good, normal mood and affect, alert and o    riented to person, place, time and date      Vitals      Vitals:             Height 5 ft 10 in / 177.8 cm           Weight 227 lbs 8 oz / 103.177411 kg           BSA 2.20 m2           BMI 32.6 kg/m2           Temperature 97.7 F / 36.5 C - Oral           Pulse 68           Respirations 20           Blood Pressure 141/79 Sitting, Right Arm           Pulse Oximetry 95%, room air            REVIEW      Results Reviewed      PCCS Results Reviewed?:  Yes Prev Lab Results, Yes Prev Radiology Results, Yes     Previous [facility] Records      Lab Results      I personally reviewed previous lab work, imaging and provider notes.            Assessment      Notes      New Medications      * Budesonide/Formoterol Fumarate (Symbicort 160/4.5 Mcg) 10.2 GM INH          Sample - Qty 2      * TIOTROPIUM BROMIDE (Spiriva Respimat 2.5 mcg/Puff) 4 GM MIST.INHAL          Sample - Qty 2      Renewed Medications      * Montelukast Sodium (Singulair*) 10 MG TAB: 10 MG PO HS #30      ASSESSMENT:      1. Mild chronic obstructive pulmonary disease without acute exacerbation.       2. Former heavy tobacco abuse of cigarettes with 60+ pack year smoking history     now in remission for 7 months.       3. Exertional dyspnea, improving.       4. Cough, improving.       5. 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       6. Chronic hypoxic respiratory failure, on nocturnal oxygen.              PLAN:      1. I have discussed with the patient regarding current symptoms and plans and     recommendations going forward.  As the cost  of his Symbicort and Spiriva     combined are about 100 dollars a month, I have offered to try changing him to     scheduled nebulizers with Brovana and Pulmicort nebs instead of Symbicort, but     he prefers the Symbicort and wishes to stay on this. I will have us check for     samples of both Symbicort 160 and Spiriva 2.5 for him today.  She should     continue these are prescribed and use albuterol as needed.        2. Continue Singulair 10 mg daily.      3.  I discussed recent overnight oximetry results and advised him to continue     supplemental oxygen with sleep. He is continuing to use and benefit from this.      4. Continue with annual low dose CT scans for lung cancer screening.  I have     congratulated him on remaining off of cigarettes.      5. He is up-to-date on flu and pneumonia vaccinations.      6. I have advised patient to increase activity as tolerated, work on weight loss    by decreasing calorie intake and increasing activity as tolerated and he has     verbalized understanding.            Patient Education      Patient Education Provided:  COPD, How to use an Inhaler      Time Spent:  > 50% /Coord Care            Electronically signed by ESPERANZA WOLFE PA-C  06/29/2020 15:25       Disclaimer: Converted document may not contain table formatting or lab diagrams. Please see [employer] for the authenticated document.

## 2021-06-15 ENCOUNTER — OFFICE VISIT (OUTPATIENT)
Dept: PULMONOLOGY | Facility: CLINIC | Age: 76
End: 2021-06-15

## 2021-06-15 VITALS
HEART RATE: 61 BPM | RESPIRATION RATE: 17 BRPM | OXYGEN SATURATION: 97 % | WEIGHT: 235 LBS | DIASTOLIC BLOOD PRESSURE: 69 MMHG | TEMPERATURE: 98.2 F | SYSTOLIC BLOOD PRESSURE: 131 MMHG | BODY MASS INDEX: 33.64 KG/M2 | HEIGHT: 70 IN

## 2021-06-15 DIAGNOSIS — R06.09 DOE (DYSPNEA ON EXERTION): ICD-10-CM

## 2021-06-15 DIAGNOSIS — J96.11 CHRONIC RESPIRATORY FAILURE WITH HYPOXIA (HCC): ICD-10-CM

## 2021-06-15 DIAGNOSIS — J44.9 CHRONIC OBSTRUCTIVE PULMONARY DISEASE, UNSPECIFIED COPD TYPE (HCC): Primary | ICD-10-CM

## 2021-06-15 PROCEDURE — 99213 OFFICE O/P EST LOW 20 MIN: CPT | Performed by: INTERNAL MEDICINE

## 2021-06-15 RX ORDER — CYANOCOBALAMIN 1000 UG/ML
INJECTION, SOLUTION INTRAMUSCULAR; SUBCUTANEOUS
COMMUNITY
End: 2022-09-01 | Stop reason: ALTCHOICE

## 2021-06-15 RX ORDER — ATORVASTATIN CALCIUM 80 MG/1
TABLET, FILM COATED ORAL
COMMUNITY
Start: 2021-05-10 | End: 2022-02-15 | Stop reason: SDUPTHER

## 2021-06-15 RX ORDER — MONTELUKAST SODIUM 10 MG/1
TABLET ORAL
COMMUNITY
End: 2022-05-20

## 2021-06-15 RX ORDER — ASPIRIN 81 MG/1
TABLET ORAL
COMMUNITY

## 2021-06-15 RX ORDER — METOPROLOL SUCCINATE 100 MG/1
TABLET, EXTENDED RELEASE ORAL
COMMUNITY
Start: 2021-06-14 | End: 2021-12-09

## 2021-06-15 RX ORDER — PANTOPRAZOLE SODIUM 40 MG/1
TABLET, DELAYED RELEASE ORAL
COMMUNITY
Start: 2021-05-26 | End: 2021-08-31 | Stop reason: SDUPTHER

## 2021-06-15 RX ORDER — OMEGA-3-ACID ETHYL ESTERS 1 G/1
1 CAPSULE, LIQUID FILLED ORAL 2 TIMES DAILY
COMMUNITY
Start: 2021-05-10 | End: 2022-02-15 | Stop reason: SDUPTHER

## 2021-06-15 RX ORDER — LOVASTATIN 20 MG/1
TABLET ORAL
COMMUNITY
End: 2021-08-12

## 2021-06-15 RX ORDER — BUDESONIDE AND FORMOTEROL FUMARATE DIHYDRATE 160; 4.5 UG/1; UG/1
2 AEROSOL RESPIRATORY (INHALATION)
COMMUNITY
Start: 2021-05-10 | End: 2022-05-20

## 2021-06-15 RX ORDER — AMLODIPINE BESYLATE 2.5 MG/1
TABLET ORAL
COMMUNITY
End: 2021-08-24

## 2021-06-15 RX ORDER — NICOTINE 21 MG/24HR
PATCH, TRANSDERMAL 24 HOURS TRANSDERMAL
COMMUNITY
End: 2021-08-12

## 2021-06-15 RX ORDER — ALBUTEROL SULFATE 90 UG/1
2 AEROSOL, METERED RESPIRATORY (INHALATION) EVERY 4 HOURS PRN
COMMUNITY
Start: 2021-05-10 | End: 2022-01-13

## 2021-06-15 RX ORDER — CYCLOBENZAPRINE HCL 5 MG
TABLET ORAL
COMMUNITY
End: 2022-05-20

## 2021-06-15 RX ORDER — LISINOPRIL AND HYDROCHLOROTHIAZIDE 20; 12.5 MG/1; MG/1
TABLET ORAL
COMMUNITY
Start: 2021-02-09 | End: 2021-08-09

## 2021-06-15 NOTE — PATIENT INSTRUCTIONS
Chronic Obstructive Pulmonary Disease  Chronic obstructive pulmonary disease (COPD) is a long-term (chronic) lung problem. When you have COPD, it is hard for air to get in and out of your lungs. Usually the condition gets worse over time, and your lungs will never return to normal. There are things you can do to keep yourself as healthy as possible.  · Your doctor may treat your condition with:  ? Medicines.  ? Oxygen.  ? Lung surgery.  · Your doctor may also recommend:  ? Rehabilitation. This includes steps to make your body work better. It may involve a team of specialists.  ? Quitting smoking, if you smoke.  ? Exercise and changes to your diet.  ? Comfort measures (palliative care).  Follow these instructions at home:  Medicines  · Take over-the-counter and prescription medicines only as told by your doctor.  · Talk to your doctor before taking any cough or allergy medicines. You may need to avoid medicines that cause your lungs to be dry.  Lifestyle  · If you smoke, stop. Smoking makes the problem worse. If you need help quitting, ask your doctor.  · Avoid being around things that make your breathing worse. This may include smoke, chemicals, and fumes.  · Stay active, but remember to rest as well.  · Learn and use tips on how to relax.  · Make sure you get enough sleep. Most adults need at least 7 hours of sleep every night.  · Eat healthy foods. Eat smaller meals more often. Rest before meals.  Controlled breathing  Learn and use tips on how to control your breathing as told by your doctor. Try:  · Breathing in (inhaling) through your nose for 1 second. Then, pucker your lips and breath out (exhale) through your lips for 2 seconds.  · Putting one hand on your belly (abdomen). Breathe in slowly through your nose for 1 second. Your hand on your belly should move out. Pucker your lips and breathe out slowly through your lips. Your hand on your belly should move in as you breathe out.    Controlled coughing  Learn  and use controlled coughing to clear mucus from your lungs. Follow these steps:  1. Lean your head a little forward.  2. Breathe in deeply.  3. Try to hold your breath for 3 seconds.  4. Keep your mouth slightly open while coughing 2 times.  5. Spit any mucus out into a tissue.  6. Rest and do the steps again 1 or 2 times as needed.  General instructions  · Make sure you get all the shots (vaccines) that your doctor recommends. Ask your doctor about a flu shot and a pneumonia shot.  · Use oxygen therapy and pulmonary rehabilitation if told by your doctor. If you need home oxygen therapy, ask your doctor if you should buy a tool to measure your oxygen level (oximeter).  · Make a COPD action plan with your doctor. This helps you to know what to do if you feel worse than usual.  · Manage any other conditions you have as told by your doctor.  · Avoid going outside when it is very hot, cold, or humid.  · Avoid people who have a sickness you can catch (contagious).  · Keep all follow-up visits as told by your doctor. This is important.  Contact a doctor if:  · You cough up more mucus than usual.  · There is a change in the color or thickness of the mucus.  · It is harder to breathe than usual.  · Your breathing is faster than usual.  · You have trouble sleeping.  · You need to use your medicines more often than usual.  · You have trouble doing your normal activities such as getting dressed or walking around the house.  Get help right away if:  · You have shortness of breath while resting.  · You have shortness of breath that stops you from:  ? Being able to talk.  ? Doing normal activities.  · Your chest hurts for longer than 5 minutes.  · Your skin color is more blue than usual.  · Your pulse oximeter shows that you have low oxygen for longer than 5 minutes.  · You have a fever.  · You feel too tired to breathe normally.  Summary  · Chronic obstructive pulmonary disease (COPD) is a long-term lung problem.  · The way your  lungs work will never return to normal. Usually the condition gets worse over time. There are things you can do to keep yourself as healthy as possible.  · Take over-the-counter and prescription medicines only as told by your doctor.  · If you smoke, stop. Smoking makes the problem worse.  This information is not intended to replace advice given to you by your health care provider. Make sure you discuss any questions you have with your health care provider.  Document Revised: 11/30/2018 Document Reviewed: 01/22/2018  Elsevier Patient Education © 2021 [employer]

## 2021-06-15 NOTE — PROGRESS NOTES
"Chief Complaint  COPD, Wheezing, and Shortness of Breath    Subjective          Frank Winter presents to [facility]  History of Present Illness     76-year-old male here for follow-up.  Overall feels that he is doing well feels that his shortness of breath is controlled on his current medications.  Currently takes Symbicort and Spiriva.  Complains of no side effects of these medications no difficulties with urination no sore or dry mouth.  No increased cough no increased bleeding production.  Does his ADLs without having difficulties does have some shortness of breath with exertional activities has no exacerbations or COPD flare since his last office visit.    Objective   Vital Signs:   /69 (BP Location: Right arm, Patient Position: Sitting)   Pulse 61   Temp 98.2 °F (36.8 °C) (Temporal)   Resp 17   Ht 177.8 cm (70\")   Wt 107 kg (235 lb)   SpO2 97%   BMI 33.72 kg/m²     Physical Exam   Vital Signs Reviewed  WDWN, Alert, NAD.    HEENT:  PERRL, EOMI.  OP, nares clear, no sinus tenderness  Neck:  Supple, no JVD, no thyromegaly  Lymph: no axillary, cervical, supraclavicular lymphadenopathy noted bilaterally  Chest:  good aeration, clear to auscultation bilaterally, tympanic to percussion bilaterally, no work of breathing noted  CV: RRR, no MGR, pulses 2+, equal.  Abd:  Soft, NT, ND, + BS, no HSM  EXT:  no clubbing, no cyanosis, no edema, no joint tenderness  Neuro:  A&Ox3, CN grossly intact, no focal deficits.  Skin: No rashes or lesions noted      Result Review :         Data reviewed: Radiologic studies CT scan from January 2021 showing no suspicious pulmonary nodules          Assessment and Plan    Diagnoses and all orders for this visit:    1. Chronic obstructive pulmonary disease, unspecified COPD type (CMS/HCC) (Primary)  Assessment & Plan:  COPD is unchanged.  Continue current medications. Symbicort and Tiotropium          2. Chronic " respiratory failure with hypoxia (CMS/HCC)  Assessment & Plan:  Resolved no longer on night time Oxygen      3. OROSCO (dyspnea on exertion)  Assessment & Plan:  Better on current inhalers        Patient will need a low-dose lung cancer screening CT scan in January 2022 we will see patient back in the office prior to then to schedule        Follow Up   Return in about 5 months (around 11/15/2021).  Patient was given instructions and counseling regarding his condition or for health maintenance advice. Please see specific information pulled into the AVS if appropriate.

## 2021-06-17 RX ORDER — TIOTROPIUM BROMIDE INHALATION SPRAY 3.12 UG/1
2 SPRAY, METERED RESPIRATORY (INHALATION)
Qty: 1 EACH | Refills: 11 | COMMUNITY
Start: 2021-06-17 | End: 2022-05-20

## 2021-07-19 RX ORDER — TIOTROPIUM BROMIDE INHALATION SPRAY 3.12 UG/1
2 SPRAY, METERED RESPIRATORY (INHALATION)
Qty: 60 EACH | Refills: 11 | COMMUNITY
Start: 2021-07-19 | End: 2021-08-12

## 2021-08-09 RX ORDER — LISINOPRIL AND HYDROCHLOROTHIAZIDE 20; 12.5 MG/1; MG/1
TABLET ORAL
Qty: 180 TABLET | Refills: 3 | Status: SHIPPED | OUTPATIENT
Start: 2021-08-09 | End: 2022-05-20 | Stop reason: ALTCHOICE

## 2021-08-12 ENCOUNTER — OFFICE VISIT (OUTPATIENT)
Dept: FAMILY MEDICINE CLINIC | Facility: CLINIC | Age: 76
End: 2021-08-12

## 2021-08-12 VITALS
SYSTOLIC BLOOD PRESSURE: 128 MMHG | BODY MASS INDEX: 33.63 KG/M2 | TEMPERATURE: 97.6 F | WEIGHT: 234.4 LBS | HEART RATE: 76 BPM | DIASTOLIC BLOOD PRESSURE: 78 MMHG | OXYGEN SATURATION: 96 % | RESPIRATION RATE: 18 BRPM

## 2021-08-12 DIAGNOSIS — E11.65 TYPE 2 DIABETES MELLITUS WITH HYPERGLYCEMIA, WITHOUT LONG-TERM CURRENT USE OF INSULIN (HCC): Primary | ICD-10-CM

## 2021-08-12 DIAGNOSIS — E55.9 VITAMIN D DEFICIENCY: ICD-10-CM

## 2021-08-12 DIAGNOSIS — I10 ESSENTIAL (PRIMARY) HYPERTENSION: ICD-10-CM

## 2021-08-12 DIAGNOSIS — R53.83 FATIGUE, UNSPECIFIED TYPE: ICD-10-CM

## 2021-08-12 DIAGNOSIS — J43.9 PULMONARY EMPHYSEMA, UNSPECIFIED EMPHYSEMA TYPE (HCC): ICD-10-CM

## 2021-08-12 DIAGNOSIS — E78.2 MIXED HYPERLIPIDEMIA: ICD-10-CM

## 2021-08-12 PROCEDURE — 99214 OFFICE O/P EST MOD 30 MIN: CPT | Performed by: FAMILY MEDICINE

## 2021-08-12 RX ORDER — EZETIMIBE 10 MG/1
10 TABLET ORAL DAILY
Qty: 90 TABLET | Refills: 1 | Status: SHIPPED | OUTPATIENT
Start: 2021-08-12 | End: 2022-02-15 | Stop reason: SDUPTHER

## 2021-08-12 NOTE — PROGRESS NOTES
Chief Complaint   Patient presents with   • Hypertension     6 month follow up    • COPD     Subjective   Frank Winter is a 76 y.o. male who presents to the office for follow-up.     HTN. Controlled. He is established with Cardiologist.    Hx of COPD. Stable.   He is established with pulm Dr. Tee.    Hx of HLD.  Uncontrolled.    He stopped smoking cigarettes on Nov 2019.    A1c 6.9 today.  He is officially diabetic.  Type II.  He is currently not on any medications for diabetes.    The following portions of the patient's history were reviewed and updated as appropriate: allergies, current medications, past family history, past medical history, past social history, past surgical history and problem list.    Review of Systems   Constitutional: Negative for chills, fever and unexpected weight loss.   Respiratory: Negative for cough, chest tightness and shortness of breath.    Cardiovascular: Negative for chest pain and palpitations.   Gastrointestinal: Negative for abdominal pain, constipation, diarrhea, nausea and vomiting.        Objective   Vitals:    08/12/21 1050   BP: 128/78   BP Location: Left arm   Patient Position: Sitting   Cuff Size: Adult   Pulse: 76   Resp: 18   Temp: 97.6 °F (36.4 °C)   SpO2: 96%   Weight: 106 kg (234 lb 6.4 oz)     Body mass index is 33.63 kg/m².  Physical Exam  Vitals reviewed.   Constitutional:       General: He is not in acute distress.     Appearance: Normal appearance. He is not ill-appearing.   HENT:      Head: Normocephalic.   Eyes:      Conjunctiva/sclera: Conjunctivae normal.   Cardiovascular:      Rate and Rhythm: Normal rate and regular rhythm.   Pulmonary:      Effort: Pulmonary effort is normal.      Breath sounds: Normal breath sounds.   Skin:     Findings: No lesion or rash.   Neurological:      Mental Status: He is alert and oriented to person, place, and time.   Psychiatric:         Mood and Affect: Mood normal.          Assessment/Plan   Diagnoses and all  orders for this visit:    1. Type 2 diabetes mellitus with hyperglycemia, without long-term current use of insulin (CMS/[facility]) (Primary)  Comments:  Uncontrolled we will start Metformin  Orders:  -     CBC Auto Differential; Future  -     Comprehensive Metabolic Panel; Future  -     Lipid Panel; Future  -     TSH; Future  -     T4, Free; Future  -     Microalbumin / Creatinine Urine Ratio - Urine, Clean Catch; Future  -     Hemoglobin A1c; Future    2. Vitamin D deficiency  -     Vitamin D 25 Hydroxy; Future    3. Fatigue, unspecified type  -     Vitamin B12 & Folate; Future    4. Mixed hyperlipidemia  Comments:  Uncontrolled we will add Zetia    5. Essential (primary) hypertension  Comments:  Controlled    6. Pulmonary emphysema, unspecified emphysema type (CMS/[facility])  Comments:  Controlled           F/u as directed  Start metformin   Start zetia      Return in about 6 months (around 2/12/2022) for Annual physical.   PHQ-2/PHQ-9 Depression Screening 8/12/2021   Little interest or pleasure in doing things 0   Feeling down, depressed, or hopeless 0   Total Score 0

## 2021-08-23 PROBLEM — Z98.61 CAD S/P PERCUTANEOUS CORONARY ANGIOPLASTY: Status: ACTIVE | Noted: 2021-08-23

## 2021-08-23 PROBLEM — E78.5 HYPERLIPIDEMIA LDL GOAL <70: Status: ACTIVE | Noted: 2021-08-23

## 2021-08-23 PROBLEM — I10 ESSENTIAL HYPERTENSION: Status: ACTIVE | Noted: 2021-08-23

## 2021-08-23 PROBLEM — Z86.79 HISTORY OF AORTIC DISSECTION: Status: ACTIVE | Noted: 2021-08-23

## 2021-08-23 PROBLEM — I25.10 CAD S/P PERCUTANEOUS CORONARY ANGIOPLASTY: Status: ACTIVE | Noted: 2021-08-23

## 2021-08-23 PROBLEM — I25.810 CORONARY ARTERY DISEASE INVOLVING CORONARY BYPASS GRAFT OF NATIVE HEART WITHOUT ANGINA PECTORIS: Status: ACTIVE | Noted: 2021-08-23

## 2021-08-23 PROBLEM — R06.09 DOE (DYSPNEA ON EXERTION): Status: RESOLVED | Noted: 2021-06-15 | Resolved: 2021-08-23

## 2021-08-23 NOTE — PATIENT INSTRUCTIONS
"Low-Sodium Eating Plan  Sodium, which is an element that makes up salt, helps you maintain a healthy balance of fluids in your body. Too much sodium can increase your blood pressure and cause fluid and waste to be held in your body.  Your health care provider or dietitian may recommend following this plan if you have high blood pressure (hypertension), kidney disease, liver disease, or heart failure. Eating less sodium can help lower your blood pressure, reduce swelling, and protect your heart, liver, and kidneys.  What are tips for following this plan?  Reading food labels  · The Nutrition Facts label lists the amount of sodium in one serving of the food. If you eat more than one serving, you must multiply the listed amount of sodium by the number of servings.  · Choose foods with less than 140 mg of sodium per serving.  · Avoid foods with 300 mg of sodium or more per serving.  Shopping    · Look for lower-sodium products, often labeled as \"low-sodium\" or \"no salt added.\"  · Always check the sodium content, even if foods are labeled as \"unsalted\" or \"no salt added.\"  · Buy fresh foods.  ? Avoid canned foods and pre-made or frozen meals.  ? Avoid canned, cured, or processed meats.  · Buy breads that have less than 80 mg of sodium per slice.  Cooking    · Eat more home-cooked food and less restaurant, buffet, and fast food.  · Avoid adding salt when cooking. Use salt-free seasonings or herbs instead of table salt or sea salt. Check with your health care provider or pharmacist before using salt substitutes.  · Cook with plant-based oils, such as canola, sunflower, or olive oil.  Meal planning  · When eating at a restaurant, ask that your food be prepared with less salt or no salt, if possible. Avoid dishes labeled as brined, pickled, cured, smoked, or made with soy sauce, miso, or teriyaki sauce.  · Avoid foods that contain MSG (monosodium glutamate). MSG is sometimes added to Chinese food, bouillon, and some canned " "foods.  · Make meals that can be grilled, baked, poached, roasted, or steamed. These are generally made with less sodium.  General information  Most people on this plan should limit their sodium intake to 1,500-2,000 mg (milligrams) of sodium each day.  What foods should I eat?  Fruits  Fresh, frozen, or canned fruit. Fruit juice.  Vegetables  Fresh or frozen vegetables. \"No salt added\" canned vegetables. \"No salt added\" tomato sauce and paste. Low-sodium or reduced-sodium tomato and vegetable juice.  Grains  Low-sodium cereals, including oats, puffed wheat and rice, and shredded wheat. Low-sodium crackers. Unsalted rice. Unsalted pasta. Low-sodium bread. Whole-grain breads and whole-grain pasta.  Meats and other proteins  Fresh or frozen (no salt added) meat, poultry, seafood, and fish. Low-sodium canned tuna and salmon. Unsalted nuts. Dried peas, beans, and lentils without added salt. Unsalted canned beans. Eggs. Unsalted nut butters.  Dairy  Milk. Soy milk. Cheese that is naturally low in sodium, such as ricotta cheese, fresh mozzarella, or Swiss cheese. Low-sodium or reduced-sodium cheese. Cream cheese. Yogurt.  Seasonings and condiments  Fresh and dried herbs and spices. Salt-free seasonings. Low-sodium mustard and ketchup. Sodium-free salad dressing. Sodium-free light mayonnaise. Fresh or refrigerated horseradish. Lemon juice. Vinegar.  Other foods  Homemade, reduced-sodium, or low-sodium soups. Unsalted popcorn and pretzels. Low-salt or salt-free chips.  The items listed above may not be a complete list of foods and beverages you can eat. Contact a dietitian for more information.  What foods should I avoid?  Vegetables  Sauerkraut, pickled vegetables, and relishes. Olives. French fries. Onion rings. Regular canned vegetables (not low-sodium or reduced-sodium). Regular canned tomato sauce and paste (not low-sodium or reduced-sodium). Regular tomato and vegetable juice (not low-sodium or reduced-sodium). Frozen " vegetables in sauces.  Grains  Instant hot cereals. Bread stuffing, pancake, and biscuit mixes. Croutons. Seasoned rice or pasta mixes. Noodle soup cups. Boxed or frozen macaroni and cheese. Regular salted crackers. Self-rising flour.  Meats and other proteins  Meat or fish that is salted, canned, smoked, spiced, or pickled. Precooked or cured meat, such as sausages or meat loaves. [place]. Ham. Pepperoni. Hot dogs. Corned beef. Chipped beef. Salt pork. Jerky. Pickled herring. Anchovies and sardines. Regular canned tuna. Salted nuts.  Dairy  Processed cheese and cheese spreads. Hard cheeses. Cheese curds. Blue cheese. Feta cheese. String cheese. Regular cottage cheese. Buttermilk. Canned milk.  Fats and oils  Salted butter. Regular margarine. Ghee. [place] fat.  Seasonings and condiments  Onion salt, garlic salt, seasoned salt, table salt, and sea salt. Canned and packaged gravies. Worcestershire sauce. Tartar sauce. Barbecue sauce. Teriyaki sauce. Soy sauce, including reduced-sodium. Steak sauce. Fish sauce. Oyster sauce. Cocktail sauce. Horseradish that you find on the shelf. Regular ketchup and mustard. Meat flavorings and tenderizers. Bouillon cubes. Hot sauce. Pre-made or packaged marinades. Pre-made or packaged taco seasonings. Relishes. Regular salad dressings. Salsa.  Other foods  Salted popcorn and pretzels. Corn chips and puffs. Potato and tortilla chips. Canned or dried soups. Pizza. Frozen entrees and pot pies.  The items listed above may not be a complete list of foods and beverages you should avoid. Contact a dietitian for more information.  Summary  · Eating less sodium can help lower your blood pressure, reduce swelling, and protect your heart, liver, and kidneys.  · Most people on this plan should limit their sodium intake to 1,500-2,000 mg (milligrams) of sodium each day.  · Canned, boxed, and frozen foods are high in sodium. Restaurant foods, fast foods, and pizza are also very high in sodium. You  also get sodium by adding salt to food.  · Try to cook at home, eat more fresh fruits and vegetables, and eat less fast food and canned, processed, or prepared foods.  This information is not intended to replace advice given to you by your health care provider. Make sure you discuss any questions you have with your health care provider.  Document Revised: 01/22/2021 Document Reviewed: 11/18/2020  [employer] Patient Education © 2021 [employer]      Cholesterol Content in Foods  Cholesterol is a waxy, fat-like substance that helps to carry fat in the blood. The body needs cholesterol in small amounts, but too much cholesterol can cause damage to the arteries and heart.  Most people should eat less than 200 milligrams (mg) of cholesterol a day.  Foods with cholesterol    Cholesterol is found in animal-based foods, such as meat, seafood, and dairy. Generally, low-fat dairy and lean meats have less cholesterol than full-fat dairy and fatty meats. The milligrams of cholesterol per serving (mg per serving) of common cholesterol-containing foods are listed below.  Meat and other proteins  · Egg -- one large whole egg has 186 mg.  · Veal shank -- 4 oz has 141 mg.  · Lean ground turkey (93% lean) -- 4 oz has 118 mg.  · Fat-trimmed lamb loin -- 4 oz has 106 mg.  · Lean ground beef (90% lean) -- 4 oz has 100 mg.  · Lobster -- 3.5 oz has 90 mg.  · Pork loin chops -- 4 oz has 86 mg.  · Canned salmon -- 3.5 oz has 83 mg.  · Fat-trimmed beef top loin -- 4 oz has 78 mg.  · Frankfurter -- 1 rony (3.5 oz) has 77 mg.  · Crab -- 3.5 oz has 71 mg.  · Roasted chicken without skin, white meat -- 4 oz has 66 mg.  · Light bologna -- 2 oz has 45 mg.  · Deli-cut turkey -- 2 oz has 31 mg.  · Canned tuna -- 3.5 oz has 31 mg.  · [place] -- 1 oz has 29 mg.  · Oysters and mussels (raw) -- 3.5 oz has 25 mg.  · Mackerel -- 1 oz has 22 mg.  · Trout -- 1 oz has 20 mg.  · Pork sausage -- 1 link (1 oz) has 17 mg.  · [place] -- 1 oz has 16 mg.  · Tilapia  -- 1 oz has 14 mg.  Dairy  · Soft-serve ice cream -- ½ cup (4 oz) has 103 mg.  · Whole-milk yogurt -- 1 cup (8 oz) has 29 mg.  · Cheddar cheese -- 1 oz has 28 mg.  · American cheese -- 1 oz has 28 mg.  · Whole milk -- 1 cup (8 oz) has 23 mg.  · 2% milk -- 1 cup (8 oz) has 18 mg.  · Cream cheese -- 1 tablespoon (Tbsp) has 15 mg.  · Cottage cheese -- ½ cup (4 oz) has 14 mg.  · Low-fat (1%) milk -- 1 cup (8 oz) has 10 mg.  · Sour cream -- 1 Tbsp has 8.5 mg.  · Low-fat yogurt -- 1 cup (8 oz) has 8 mg.  · Nonfat Greek yogurt -- 1 cup (8 oz) has 7 mg.  · Half-and-half cream -- 1 Tbsp has 5 mg.  Fats and oils  · Cod liver oil -- 1 tablespoon (Tbsp) has 82 mg.  · Butter -- 1 Tbsp has 15 mg.  · Lard -- 1 Tbsp has 14 mg.  · [place] grease -- 1 Tbsp has 14 mg.  · Mayonnaise -- 1 Tbsp has 5-10 mg.  · Margarine -- 1 Tbsp has 3-10 mg.  Exact amounts of cholesterol in these foods may vary depending on specific ingredients and brands.  Foods without cholesterol  Most plant-based foods do not have cholesterol unless you combine them with a food that has cholesterol. Foods without cholesterol include:  · Grains and cereals.  · Vegetables.  · Fruits.  · Vegetable oils, such as olive, canola, and sunflower oil.  · Legumes, such as peas, beans, and lentils.  · Nuts and seeds.  · Egg whites.  Summary  · The body needs cholesterol in small amounts, but too much cholesterol can cause damage to the arteries and heart.  · Most people should eat less than 200 milligrams (mg) of cholesterol a day.  This information is not intended to replace advice given to you by your health care provider. Make sure you discuss any questions you have with your health care provider.  Document Revised: 11/30/2018 Document Reviewed: 08/14/2018  [employer] Patient Education © 2021 [employer]

## 2021-08-24 ENCOUNTER — OFFICE VISIT (OUTPATIENT)
Dept: CARDIOLOGY | Facility: CLINIC | Age: 76
End: 2021-08-24

## 2021-08-24 VITALS
SYSTOLIC BLOOD PRESSURE: 156 MMHG | DIASTOLIC BLOOD PRESSURE: 77 MMHG | OXYGEN SATURATION: 97 % | WEIGHT: 226 LBS | BODY MASS INDEX: 32.35 KG/M2 | HEIGHT: 70 IN | HEART RATE: 68 BPM

## 2021-08-24 DIAGNOSIS — I25.10 CAD S/P PERCUTANEOUS CORONARY ANGIOPLASTY: Primary | ICD-10-CM

## 2021-08-24 DIAGNOSIS — I10 ESSENTIAL HYPERTENSION: ICD-10-CM

## 2021-08-24 DIAGNOSIS — Z86.79 HISTORY OF AORTIC DISSECTION: ICD-10-CM

## 2021-08-24 DIAGNOSIS — E78.5 HYPERLIPIDEMIA LDL GOAL <70: ICD-10-CM

## 2021-08-24 DIAGNOSIS — Z98.61 CAD S/P PERCUTANEOUS CORONARY ANGIOPLASTY: Primary | ICD-10-CM

## 2021-08-24 PROBLEM — I71.019 AORTIC DISSECTION, THORACIC: Status: ACTIVE | Noted: 2021-08-24

## 2021-08-24 PROCEDURE — 99214 OFFICE O/P EST MOD 30 MIN: CPT | Performed by: INTERNAL MEDICINE

## 2021-08-24 RX ORDER — AMLODIPINE BESYLATE 5 MG/1
5 TABLET ORAL DAILY
Qty: 90 TABLET | Refills: 3 | Status: SHIPPED | OUTPATIENT
Start: 2021-08-24 | End: 2022-02-24 | Stop reason: SDUPTHER

## 2021-08-24 NOTE — ASSESSMENT & PLAN NOTE
Patient on Lipitor 80 no myalgia symptoms LDL mildly above goal previously patient wished to hold off until next lipid panel before the addition of any medication if above 70 would add Zetia 10 mg once a day

## 2021-08-24 NOTE — PROGRESS NOTES
Chief Complaint  Follow-up    Subjective    Patient has been doing well denies any ongoing chest pain shortness of breath or myalgias symptoms    Past Medical History:   Diagnosis Date   • Anemia    • Arthritis    • CAD S/P percutaneous coronary angioplasty 8/23/2021    1 stent placed in 2011   • Chronic bronchitis    • COPD     INHALERS   • Emphysema lung    • Essential hypertension 8/23/2021   • Heart attack 2011   • Hemorrhoids     RECTAL PROB    • Hernia cerebri    • History of aortic dissection 8/23/2021   • Hyperlipidemia LDL goal <70 8/23/2021   • Melena          Current Outpatient Medications:   •  albuterol sulfate  (90 Base) MCG/ACT inhaler, Inhale 2 puffs Every 4 (Four) Hours As Needed for Shortness of Air., Disp: , Rfl:   •  aspirin (aspirin) 81 MG EC tablet, aspirin 81 mg oral tablet,delayed release (DR/EC) take 1 tablet (81 mg) by oral route once daily   Active, Disp: , Rfl:   •  atorvastatin (LIPITOR) 80 MG tablet, atorvastatin 80 mg oral tablet TAKE 1 TABLET AT BEDTIME 5/10/2021  Active, Disp: , Rfl:   •  ezetimibe (Zetia) 10 MG tablet, Take 1 tablet by mouth Daily., Disp: 90 tablet, Rfl: 1  •  lisinopril-hydrochlorothiazide (PRINZIDE,ZESTORETIC) 20-12.5 MG per tablet, TAKE 1 TABLET TWICE DAILY, Disp: 180 tablet, Rfl: 3  •  metFORMIN (Glucophage) 500 MG tablet, Take 1 tablet by mouth 2 (Two) Times a Day With Meals., Disp: 180 tablet, Rfl: 1  •  metoprolol succinate XL (TOPROL-XL) 100 MG 24 hr tablet, , Disp: , Rfl:   •  omega-3 acid ethyl esters (LOVAZA) 1 g capsule, Take 1 g by mouth 2 (Two) Times a Day. 2 AM and 2PM, Disp: , Rfl:   •  pantoprazole (PROTONIX) 40 MG EC tablet, , Disp: , Rfl:   •  Symbicort 160-4.5 MCG/ACT inhaler, Inhale 2 puffs 2 (Two) Times a Day. Nightly, Disp: , Rfl:   •  tiotropium bromide monohydrate (Spiriva Respimat) 2.5 MCG/ACT aerosol solution inhaler, Inhale 2 puffs Daily. LOT: [id number] Exp: 11/22, Disp: 1 each, Rfl: 11  •  amLODIPine (NORVASC) 5 MG tablet, Take 1  "tablet by mouth Daily., Disp: 90 tablet, Rfl: 3  •  cyanocobalamin 1000 MCG/ML injection, cyanocobalamin (vitamin B-12) 1,000 mcg/mL injection solution Inject 1 ml every 2 weeks.   Suspended, Disp: , Rfl:   •  cyclobenzaprine (FLEXERIL) 5 MG tablet, Flexeril 5 mg oral tablet take 1 tablet by oral route 3 times a day as needed for 30 days   Suspended, Disp: , Rfl:   •  montelukast (SINGULAIR) 10 MG tablet, montelukast 10 mg oral tablet take 1 tablet (10 mg) by oral route once daily in the evening   Active, Disp: , Rfl:     Medications Discontinued During This Encounter   Medication Reason   • amLODIPine (NORVASC) 2.5 MG tablet      No Known Allergies     Social History     Tobacco Use   • Smoking status: Former Smoker     Packs/day: 1.00     Years: 53.00     Pack years: 53.00     Quit date: 2019     Years since quittin.8   • Smokeless tobacco: Never Used   Substance Use Topics   • Alcohol use: Never     Comment: UNKNOWN   • Drug use: Never       Family History   Problem Relation Age of Onset   • Heart disease Father    • Cancer Brother         Objective     /77   Pulse 68   Ht 177.8 cm (70\")   Wt 103 kg (226 lb)   SpO2 97%   BMI 32.43 kg/m²       Physical Exam    General Appearance:   · no acute distress  · Alert and oriented x3  HENT:   · lips not cyanotic  · Atraumatic  Neck:  · No jvd   · supple  Respiratory:  · no respiratory distress  · normal breath sounds  · no rales  Cardiovascular:  · Regular rate and rhythm  · no S3, no S4   · no murmur  · no rub  Extremities  · No cyanosis  · lower extremity edema: none    Skin:   · warm, dry  · No rashes      Result Review :     No results found for: PROBNP       No results found for: TSH   No results found for: FREET4   No results found for: DDIMERQUANT  Magnesium   Date Value Ref Range Status   2019 2.25 1.60 - 2.30 mg/dL Final      No results found for: DIGOXIN   Lab Results   Component Value Date    TROPONINT <0.01 2019             No " results found for: POCOP     LDL cholesterol is 91  HDL was 36  Triglycerides 197  Hemoglobin 13.5  Creatinine is 1.43              Diagnoses and all orders for this visit:    1. CAD S/P percutaneous coronary angioplasty (Primary)  Assessment & Plan:  No angina continue with aspirin 81 mg once a day      2. Essential hypertension  Assessment & Plan:  Mildly above goal increasing Norvasc to 5 mg once a day counseled on low-sodium diet      3. Hyperlipidemia LDL goal <70  Assessment & Plan:  Patient on Lipitor 80 no myalgia symptoms LDL mildly above goal previously patient wished to hold off until next lipid panel before the addition of any medication if above 70 would add Zetia 10 mg once a day      4. History of aortic dissection    Other orders  -     amLODIPine (NORVASC) 5 MG tablet; Take 1 tablet by mouth Daily.  Dispense: 90 tablet; Refill: 3          Follow Up     Return in about 6 months (around 2/24/2022) for EKG with F/U.          Patient was given instructions and counseling regarding his condition or for health maintenance advice. Please see specific information pulled into the AVS if appropriate.

## 2021-08-31 DIAGNOSIS — K21.9 GASTROESOPHAGEAL REFLUX DISEASE, UNSPECIFIED WHETHER ESOPHAGITIS PRESENT: Primary | ICD-10-CM

## 2021-08-31 RX ORDER — PANTOPRAZOLE SODIUM 40 MG/1
40 TABLET, DELAYED RELEASE ORAL DAILY
Qty: 30 TABLET | Refills: 3 | Status: SHIPPED | OUTPATIENT
Start: 2021-08-31 | End: 2021-09-30

## 2021-08-31 NOTE — TELEPHONE ENCOUNTER
Patient presented to the office requesting a Protonix refill be sent to [facility] at [facility]. Please advise.

## 2021-11-22 ENCOUNTER — CLINICAL SUPPORT (OUTPATIENT)
Dept: FAMILY MEDICINE CLINIC | Facility: CLINIC | Age: 76
End: 2021-11-22

## 2021-11-22 DIAGNOSIS — Z23 NEED FOR INFLUENZA VACCINATION: Primary | ICD-10-CM

## 2021-11-22 PROCEDURE — G0008 ADMIN INFLUENZA VIRUS VAC: HCPCS | Performed by: FAMILY MEDICINE

## 2021-11-22 PROCEDURE — 90662 IIV NO PRSV INCREASED AG IM: CPT | Performed by: FAMILY MEDICINE

## 2021-12-09 RX ORDER — METOPROLOL SUCCINATE 100 MG/1
TABLET, EXTENDED RELEASE ORAL
Qty: 90 TABLET | Refills: 2 | Status: SHIPPED | OUTPATIENT
Start: 2021-12-09 | End: 2022-09-12

## 2022-01-13 RX ORDER — ALBUTEROL SULFATE 90 UG/1
AEROSOL, METERED RESPIRATORY (INHALATION)
Qty: 54 G | Refills: 11 | Status: SHIPPED | OUTPATIENT
Start: 2022-01-13 | End: 2023-03-30 | Stop reason: SDUPTHER

## 2022-02-07 ENCOUNTER — TELEPHONE (OUTPATIENT)
Dept: FAMILY MEDICINE CLINIC | Facility: CLINIC | Age: 77
End: 2022-02-07

## 2022-02-07 NOTE — TELEPHONE ENCOUNTER
Caller: Frank Winter    Relationship: Self    Best call back number: [phone]    What orders are you requesting (i.e. lab or imaging): BLOOD WORK     In what timeframe would the patient need to come in: ASAP    PATIENT HAS APPT 2-15-22    Where will you receive your lab/imaging services: [facility]

## 2022-02-15 ENCOUNTER — OFFICE VISIT (OUTPATIENT)
Dept: FAMILY MEDICINE CLINIC | Facility: CLINIC | Age: 77
End: 2022-02-15

## 2022-02-15 VITALS
HEIGHT: 70 IN | DIASTOLIC BLOOD PRESSURE: 70 MMHG | HEART RATE: 76 BPM | WEIGHT: 220.3 LBS | SYSTOLIC BLOOD PRESSURE: 138 MMHG | OXYGEN SATURATION: 96 % | BODY MASS INDEX: 31.54 KG/M2 | TEMPERATURE: 98.4 F

## 2022-02-15 DIAGNOSIS — K21.9 GASTROESOPHAGEAL REFLUX DISEASE WITHOUT ESOPHAGITIS: ICD-10-CM

## 2022-02-15 DIAGNOSIS — Z00.00 MEDICARE ANNUAL WELLNESS VISIT, SUBSEQUENT: ICD-10-CM

## 2022-02-15 DIAGNOSIS — E78.2 MIXED HYPERLIPIDEMIA: ICD-10-CM

## 2022-02-15 DIAGNOSIS — I10 ESSENTIAL (PRIMARY) HYPERTENSION: ICD-10-CM

## 2022-02-15 DIAGNOSIS — F03.90 DEMENTIA WITHOUT BEHAVIORAL DISTURBANCE, UNSPECIFIED DEMENTIA TYPE: ICD-10-CM

## 2022-02-15 PROCEDURE — 1159F MED LIST DOCD IN RCRD: CPT | Performed by: FAMILY MEDICINE

## 2022-02-15 PROCEDURE — 96160 PT-FOCUSED HLTH RISK ASSMT: CPT | Performed by: FAMILY MEDICINE

## 2022-02-15 PROCEDURE — G0439 PPPS, SUBSEQ VISIT: HCPCS | Performed by: FAMILY MEDICINE

## 2022-02-15 PROCEDURE — 1170F FXNL STATUS ASSESSED: CPT | Performed by: FAMILY MEDICINE

## 2022-02-15 RX ORDER — PANTOPRAZOLE SODIUM 40 MG/1
40 TABLET, DELAYED RELEASE ORAL DAILY
Qty: 90 TABLET | Refills: 2 | Status: SHIPPED | OUTPATIENT
Start: 2022-02-15 | End: 2022-08-19 | Stop reason: SDUPTHER

## 2022-02-15 RX ORDER — LANCETS
EACH MISCELLANEOUS
COMMUNITY
Start: 2022-01-27

## 2022-02-15 RX ORDER — BLOOD SUGAR DIAGNOSTIC
STRIP MISCELLANEOUS
COMMUNITY
Start: 2022-01-27

## 2022-02-15 RX ORDER — PANTOPRAZOLE SODIUM 40 MG/1
TABLET, DELAYED RELEASE ORAL
COMMUNITY
Start: 2021-12-09 | End: 2022-02-15 | Stop reason: SDUPTHER

## 2022-02-15 RX ORDER — EZETIMIBE 10 MG/1
10 TABLET ORAL DAILY
Qty: 90 TABLET | Refills: 1 | Status: SHIPPED | OUTPATIENT
Start: 2022-02-15 | End: 2022-08-19 | Stop reason: SDUPTHER

## 2022-02-15 RX ORDER — OMEGA-3-ACID ETHYL ESTERS 1 G/1
2 CAPSULE, LIQUID FILLED ORAL 2 TIMES DAILY
Qty: 360 CAPSULE | Refills: 1 | Status: SHIPPED | OUTPATIENT
Start: 2022-02-15 | End: 2022-08-19 | Stop reason: SDUPTHER

## 2022-02-15 RX ORDER — ATORVASTATIN CALCIUM 80 MG/1
80 TABLET, FILM COATED ORAL NIGHTLY
Qty: 90 TABLET | Refills: 1 | Status: SHIPPED | OUTPATIENT
Start: 2022-02-15 | End: 2022-08-15 | Stop reason: SDUPTHER

## 2022-02-15 NOTE — PROGRESS NOTES
The ABCs of the Annual Wellness Visit  Subsequent Medicare Wellness Visit    Chief Complaint   Patient presents with   • Medicare Wellness-subsequent     [facility]       Subjective    History of Present Illness:  Frank Winter is a 76 y.o. male who presents for a Subsequent Medicare Wellness Visit.    The following portions of the patient's history were reviewed and   updated as appropriate: allergies, current medications, past surgical history and problem list.    Compared to one year ago, the patient feels his physical   health is the same.    Compared to one year ago, the patient feels his mental   health is the same.    Recent Hospitalizations:  He was not admitted to the hospital during the last year.       Current Medical Providers:  Patient Care Team:  Frank Mei MD as PCP - General (Family Medicine)    Outpatient Medications Prior to Visit   Medication Sig Dispense Refill   • Accu-Chek Guide test strip      • Accu-Chek Softclix Lancets lancets      • albuterol sulfate  (90 Base) MCG/ACT inhaler INHALE 2 PUFFS EVERY 4 HOURS AS NEEDED FOR SHORTNESS OF BREATH 54 g 11   • amLODIPine (NORVASC) 5 MG tablet Take 1 tablet by mouth Daily. 90 tablet 3   • aspirin (aspirin) 81 MG EC tablet aspirin 81 mg oral tablet,delayed release (DR/EC) take 1 tablet (81 mg) by oral route once daily   Active     • cyanocobalamin 1000 MCG/ML injection cyanocobalamin (vitamin B-12) 1,000 mcg/mL injection solution Inject 1 ml every 2 weeks.   Suspended     • cyclobenzaprine (FLEXERIL) 5 MG tablet Flexeril 5 mg oral tablet take 1 tablet by oral route 3 times a day as needed for 30 days   Suspended     • lisinopril-hydrochlorothiazide (PRINZIDE,ZESTORETIC) 20-12.5 MG per tablet TAKE 1 TABLET TWICE DAILY 180 tablet 3   • metFORMIN (Glucophage) 500 MG tablet Take 1 tablet by mouth 2 (Two) Times a Day With Meals. 180 tablet 1   • metoprolol succinate XL (TOPROL-XL) 100 MG 24 hr tablet TAKE ONE TABLET BY MOUTH EVERY 24 HOURS 90  "tablet 2   • montelukast (SINGULAIR) 10 MG tablet montelukast 10 mg oral tablet take 1 tablet (10 mg) by oral route once daily in the evening   Active     • atorvastatin (LIPITOR) 80 MG tablet atorvastatin 80 mg oral tablet TAKE 1 TABLET AT BEDTIME 5/10/2021  Active     • ezetimibe (Zetia) 10 MG tablet Take 1 tablet by mouth Daily. 90 tablet 1   • pantoprazole (PROTONIX) 40 MG EC tablet      • Symbicort 160-4.5 MCG/ACT inhaler Inhale 2 puffs 2 (Two) Times a Day. Nightly     • tiotropium bromide monohydrate (Spiriva Respimat) 2.5 MCG/ACT aerosol solution inhaler Inhale 2 puffs Daily. LOT: [id number]  Exp: 11/22 1 each 11   • omega-3 acid ethyl esters (LOVAZA) 1 g capsule Take 1 g by mouth 2 (Two) Times a Day. 2 AM and 2PM       No facility-administered medications prior to visit.       No opioid medication identified on active medication list. I have reviewed chart for other potential  high risk medication/s and harmful drug interactions in the elderly.          Aspirin is on active medication list. Aspirin use is indicated based on review of current medical condition/s. Pros and cons of this therapy have been discussed today. Benefits of this medication outweigh potential harm.  Patient has been encouraged to continue taking this medication.  .      Patient Active Problem List   Diagnosis   • Chronic respiratory failure with hypoxia    • Chronic obstructive pulmonary disease    • History of aortic dissection   • CAD S/P percutaneous coronary angioplasty   • Essential hypertension   • Hyperlipidemia LDL goal <70   • Aortic dissection, thoracic (HCC)     Advance Care Planning  Advance Directive is not on file.  ACP discussion was held with the patient during this visit. Patient does not have an advance directive, information provided.          Objective    Vitals:    02/15/22 1400   BP: 138/70   Pulse: 76   Temp: 98.4 °F (36.9 °C)   SpO2: 96%   Weight: 99.9 kg (220 lb 4.8 oz)   Height: 177.8 cm (70\")     BMI Readings " from Last 1 Encounters:   02/15/22 31.61 kg/m²   BMI is above normal parameters. Recommendations include: nutrition counseling    Does the patient have evidence of cognitive impairment? Yes    Physical Exam  Vitals reviewed.   Constitutional:       General: He is not in acute distress.     Appearance: Normal appearance. He is not ill-appearing.   HENT:      Head: Normocephalic.   Eyes:      Conjunctiva/sclera: Conjunctivae normal.   Cardiovascular:      Rate and Rhythm: Normal rate and regular rhythm.   Pulmonary:      Effort: Pulmonary effort is normal.      Breath sounds: Normal breath sounds.   Skin:     Findings: No lesion or rash.   Neurological:      Mental Status: He is alert and oriented to person, place, and time.   Psychiatric:         Mood and Affect: Mood normal.                 HEALTH RISK ASSESSMENT    Smoking Status:  Social History     Tobacco Use   Smoking Status Former Smoker   • Packs/day: 1.00   • Years: 53.00   • Pack years: 53.00   • Quit date: 2019   • Years since quittin.2   Smokeless Tobacco Never Used     Alcohol Consumption:  Social History     Substance and Sexual Activity   Alcohol Use Never    Comment: UNKNOWN     Fall Risk Screen:    [facility] and patient is at LOW risk for falls.Assessment completed on:2/15/2022    Depression Screening:  PHQ-2/PHQ-9 Depression Screening 2/15/2022   Little interest or pleasure in doing things 1   Feeling down, depressed, or hopeless 1   Trouble falling or staying asleep, or sleeping too much 2   Feeling tired or having little energy 1   Poor appetite or overeating 0   Feeling bad about yourself - or that you are a failure or have let yourself or your family down 0   Trouble concentrating on things, such as reading the newspaper or watching television 0   Moving or speaking so slowly that other people could have noticed. Or the opposite - being so fidgety or restless that you have been moving around a lot more  than usual 0   Thoughts that you would be better off dead, or of hurting yourself in some way 0   Total Score 5   If you checked off any problems, how difficult have these problems made it for you to do your work, take care of things at home, or get along with other people? Not difficult at all       Health Habits and Functional and Cognitive Screening:  Functional & Cognitive Status 2/15/2022   Do you have difficulty preparing food and eating? No   Do you have difficulty bathing yourself, getting dressed or grooming yourself? No   Do you have difficulty using the toilet? No   Do you have difficulty moving around from place to place? No   Do you have trouble with steps or getting out of a bed or a chair? No   Current Diet Well Balanced Diet   Dental Exam Not up to date   Eye Exam Not up to date   Exercise (times per week) 2 times per week   Do you need help using the phone?  No   Are you deaf or do you have serious difficulty hearing?  No   Do you need help with transportation? No   Do you need help shopping? No   Do you need help preparing meals?  No   Do you need help with housework?  No   Do you need help with laundry? No   Do you need help taking your medications? No   Do you need help managing money? No   Do you ever drive or ride in a car without wearing a seat belt? No   Have you felt unusual stress, anger or loneliness in the last month? No   Who do you live with? Spouse   If you need help, do you have trouble finding someone available to you? No   Have you been bothered in the last four weeks by sexual problems? No   Do you have difficulty concentrating, remembering or making decisions? Yes       Age-appropriate Screening Schedule:  Refer to the list below for future screening recommendations based on patient's age, sex and/or medical conditions. Orders for these recommended tests are listed in the plan section. The patient has been provided with a written plan.    Health Maintenance   Topic Date Due   •  URINE MICROALBUMIN  Never done   • ZOSTER VACCINE (1 of 2) Never done   • DIABETIC FOOT EXAM  Never done   • DIABETIC EYE EXAM  Never done   • HEMOGLOBIN A1C  02/09/2022   • LIPID PANEL  08/09/2022   • TDAP/TD VACCINES (2 - Td or Tdap) 10/27/2024   • INFLUENZA VACCINE  Completed              Assessment/Plan   CMS Preventative Services Quick Reference  Risk Factors Identified During Encounter  Dementia/Memory   Inactivity/Sedentary  The above risks/problems have been discussed with the patient.  Follow up actions/plans if indicated are seen below in the Assessment/Plan Section.  Pertinent information has been shared with the patient in the After Visit Summary.    Diagnoses and all orders for this visit:    1. Dementia without behavioral disturbance, unspecified dementia type (HCC)  Comments:  refer to neurologist. Patient and myself have noticed a difference in regards to his memory.  Orders:  -     Ambulatory Referral to Neurology    2. Mixed hyperlipidemia  Comments:  get back on cholesterol meds.    3. Essential (primary) hypertension  Comments:  controlled.    4. Gastroesophageal reflux disease without esophagitis    5. Medicare annual wellness visit, subsequent    Other orders  -     pantoprazole (PROTONIX) 40 MG EC tablet; Take 1 tablet by mouth Daily.  Dispense: 90 tablet; Refill: 2  -     ezetimibe (Zetia) 10 MG tablet; Take 1 tablet by mouth Daily.  Dispense: 90 tablet; Refill: 1  -     atorvastatin (LIPITOR) 80 MG tablet; Take 1 tablet by mouth Every Night.  Dispense: 90 tablet; Refill: 1  -     omega-3 acid ethyl esters (LOVAZA) 1 g capsule; Take 2 capsules by mouth 2 (Two) Times a Day for 90 days. 2 AM and 2PM  Dispense: 360 capsule; Refill: 1  -     sertraline (Zoloft) 50 MG tablet; Take 1 tablet by mouth Daily.  Dispense: 90 tablet; Refill: 1        Follow Up:   Return in about 6 months (around 8/15/2022).     An After Visit Summary and PPPS were made available to the patient.

## 2022-02-24 ENCOUNTER — OFFICE VISIT (OUTPATIENT)
Dept: CARDIOLOGY | Facility: CLINIC | Age: 77
End: 2022-02-24

## 2022-02-24 VITALS
SYSTOLIC BLOOD PRESSURE: 123 MMHG | HEART RATE: 70 BPM | BODY MASS INDEX: 31.35 KG/M2 | WEIGHT: 219 LBS | DIASTOLIC BLOOD PRESSURE: 67 MMHG | HEIGHT: 70 IN

## 2022-02-24 DIAGNOSIS — Z98.61 CAD S/P PERCUTANEOUS CORONARY ANGIOPLASTY: Primary | ICD-10-CM

## 2022-02-24 DIAGNOSIS — I10 ESSENTIAL HYPERTENSION: ICD-10-CM

## 2022-02-24 DIAGNOSIS — E78.5 HYPERLIPIDEMIA LDL GOAL <70: ICD-10-CM

## 2022-02-24 DIAGNOSIS — I25.10 CAD S/P PERCUTANEOUS CORONARY ANGIOPLASTY: Primary | ICD-10-CM

## 2022-02-24 PROBLEM — I45.10 RBBB: Status: ACTIVE | Noted: 2022-02-24

## 2022-02-24 PROCEDURE — 99214 OFFICE O/P EST MOD 30 MIN: CPT | Performed by: INTERNAL MEDICINE

## 2022-02-24 PROCEDURE — 93000 ELECTROCARDIOGRAM COMPLETE: CPT | Performed by: INTERNAL MEDICINE

## 2022-02-24 RX ORDER — AMLODIPINE BESYLATE 5 MG/1
5 TABLET ORAL DAILY
Qty: 90 TABLET | Refills: 3 | Status: SHIPPED | OUTPATIENT
Start: 2022-02-24 | End: 2022-09-27

## 2022-02-24 NOTE — ASSESSMENT & PLAN NOTE
Well controlled continue on current dose of  Lisinopril hctz 20/12.5 qd and amolodipine 5 qd   Counseled patient on  • low-sodium diet of less than 2 g  • Aerobic activity 30 minutes a day 5 times a week

## 2022-02-24 NOTE — PROGRESS NOTES
Chief Complaint  6mo follow w/ ekg and Hypertension    Subjective    The patient's has had no new issues since last visit.  Denies any chest discomfort or shortness of breath issues    Past Medical History:   Diagnosis Date   • Anemia    • Arthritis    • CAD S/P percutaneous coronary angioplasty 8/23/2021    1 stent placed in 2011   • Chronic bronchitis    • COPD     INHALERS   • Emphysema lung    • Essential hypertension 8/23/2021   • Heart attack 2011   • Hemorrhoids     RECTAL PROB    • Hernia cerebri    • History of aortic dissection 8/23/2021   • Hyperlipidemia LDL goal <70 8/23/2021   • Melena          Current Outpatient Medications:   •  Accu-Chek Guide test strip, , Disp: , Rfl:   •  Accu-Chek Softclix Lancets lancets, , Disp: , Rfl:   •  albuterol sulfate  (90 Base) MCG/ACT inhaler, INHALE 2 PUFFS EVERY 4 HOURS AS NEEDED FOR SHORTNESS OF BREATH, Disp: 54 g, Rfl: 11  •  amLODIPine (NORVASC) 5 MG tablet, Take 1 tablet by mouth Daily., Disp: 90 tablet, Rfl: 3  •  aspirin (aspirin) 81 MG EC tablet, aspirin 81 mg oral tablet,delayed release (DR/EC) take 1 tablet (81 mg) by oral route once daily   Active, Disp: , Rfl:   •  atorvastatin (LIPITOR) 80 MG tablet, Take 1 tablet by mouth Every Night., Disp: 90 tablet, Rfl: 1  •  cyanocobalamin 1000 MCG/ML injection, cyanocobalamin (vitamin B-12) 1,000 mcg/mL injection solution Inject 1 ml every 2 weeks.   Suspended, Disp: , Rfl:   •  cyclobenzaprine (FLEXERIL) 5 MG tablet, Flexeril 5 mg oral tablet take 1 tablet by oral route 3 times a day as needed for 30 days   Suspended, Disp: , Rfl:   •  ezetimibe (Zetia) 10 MG tablet, Take 1 tablet by mouth Daily., Disp: 90 tablet, Rfl: 1  •  lisinopril-hydrochlorothiazide (PRINZIDE,ZESTORETIC) 20-12.5 MG per tablet, TAKE 1 TABLET TWICE DAILY, Disp: 180 tablet, Rfl: 3  •  metFORMIN (Glucophage) 500 MG tablet, Take 1 tablet by mouth 2 (Two) Times a Day With Meals., Disp: 180 tablet, Rfl: 1  •  metoprolol succinate XL  "(TOPROL-XL) 100 MG 24 hr tablet, TAKE ONE TABLET BY MOUTH EVERY 24 HOURS, Disp: 90 tablet, Rfl: 2  •  montelukast (SINGULAIR) 10 MG tablet, montelukast 10 mg oral tablet take 1 tablet (10 mg) by oral route once daily in the evening   Active, Disp: , Rfl:   •  omega-3 acid ethyl esters (LOVAZA) 1 g capsule, Take 2 capsules by mouth 2 (Two) Times a Day for 90 days. 2 AM and 2PM, Disp: 360 capsule, Rfl: 1  •  pantoprazole (PROTONIX) 40 MG EC tablet, Take 1 tablet by mouth Daily., Disp: 90 tablet, Rfl: 2  •  sertraline (Zoloft) 50 MG tablet, Take 1 tablet by mouth Daily., Disp: 90 tablet, Rfl: 1  •  Symbicort 160-4.5 MCG/ACT inhaler, Inhale 2 puffs 2 (Two) Times a Day. Nightly, Disp: , Rfl:   •  tiotropium bromide monohydrate (Spiriva Respimat) 2.5 MCG/ACT aerosol solution inhaler, Inhale 2 puffs Daily. LOT: [id number] Exp: , Disp: 1 each, Rfl: 11    Medications Discontinued During This Encounter   Medication Reason   • amLODIPine (NORVASC) 5 MG tablet Reorder     No Known Allergies     Social History     Tobacco Use   • Smoking status: Former Smoker     Packs/day: 1.00     Years: 53.00     Pack years: 53.00     Quit date: 2019     Years since quittin.3   • Smokeless tobacco: Never Used   Substance Use Topics   • Alcohol use: Never     Comment: UNKNOWN   • Drug use: Never       Family History   Problem Relation Age of Onset   • Heart disease Father    • Cancer Brother         Objective     /67   Pulse 70   Ht 177.8 cm (70\")   Wt 99.3 kg (219 lb)   BMI 31.42 kg/m²       Physical Exam    General Appearance:   · no acute distress  · Alert and oriented x3  HENT:   · lips not cyanotic  · Atraumatic  Neck:  · No jvd   · supple  Respiratory:  · no respiratory distress  · normal breath sounds  · no rales  Cardiovascular:  · Regular rate and rhythm  · no S3, no S4   · no murmur  · no rub  Extremities  · No cyanosis  · lower extremity edema: none    Skin:   · warm, dry  · No rashes      Result Review :     No " results found for: PROBNP       No results found for: TSH   No results found for: FREET4   No results found for: DDIMERQUANT  Magnesium   Date Value Ref Range Status   11/20/2019 2.25 1.60 - 2.30 mg/dL Final      No results found for: DIGOXIN   Lab Results   Component Value Date    TROPONINT <0.01 11/17/2019             No results found for: POCTROP         ECG 12 Lead    Date/Time: 2/24/2022 4:47 PM  Performed by: Fernie Kevin MD  Authorized by: Fernie Kevin MD   Comparison: compared with previous ECG   Similar to previous ECG  Rhythm: sinus rhythm  Conduction: right bundle branch block and left posterior fascicular block                   Diagnoses and all orders for this visit:    1. CAD S/P percutaneous coronary angioplasty (Primary)  Assessment & Plan:  Patient is doing well no ongoing angina continue with chronic aspirin 81 mg daily        2. Essential hypertension  Assessment & Plan:  Well controlled continue on current dose of  Lisinopril hctz 20/12.5 qd and amolodipine 5 qd   Counseled patient on  • low-sodium diet of less than 2 g  • Aerobic activity 30 minutes a day 5 times a week            3. Hyperlipidemia LDL goal <70  Assessment & Plan:  Patient on Lipitor 80 nightly and recently decided on Zetia 10 once a day repeat lipids goal LDL less than 70      Other orders  -     amLODIPine (NORVASC) 5 MG tablet; Take 1 tablet by mouth Daily.  Dispense: 90 tablet; Refill: 3  -     ECG 12 Lead          Follow Up     Return in about 6 months (around 8/24/2022).          Patient was given instructions and counseling regarding his condition or for health maintenance advice. Please see specific information pulled into the AVS if appropriate.

## 2022-02-24 NOTE — ASSESSMENT & PLAN NOTE
Patient on Lipitor 80 nightly and recently decided on Zetia 10 once a day repeat lipids goal LDL less than 70

## 2022-03-17 ENCOUNTER — OFFICE VISIT (OUTPATIENT)
Dept: NEUROLOGY | Facility: CLINIC | Age: 77
End: 2022-03-17

## 2022-03-17 VITALS
SYSTOLIC BLOOD PRESSURE: 127 MMHG | WEIGHT: 218.8 LBS | HEIGHT: 70 IN | HEART RATE: 66 BPM | BODY MASS INDEX: 31.32 KG/M2 | DIASTOLIC BLOOD PRESSURE: 65 MMHG

## 2022-03-17 DIAGNOSIS — G31.84 MILD COGNITIVE IMPAIRMENT: Primary | ICD-10-CM

## 2022-03-17 PROCEDURE — 99204 OFFICE O/P NEW MOD 45 MIN: CPT | Performed by: PSYCHIATRY & NEUROLOGY

## 2022-03-17 NOTE — ASSESSMENT & PLAN NOTE
He has mild cognitive impairment.  I will do an MRI of the brain and I will see him again in 6 months time for follow-up.  I have placed a limitation in his activities at this time.

## 2022-03-17 NOTE — PROGRESS NOTES
"Chief Complaint  Memory Loss    Subjective          Frank Winter is a 76 y.o. male who presents to [facility]  History of Present Illness  76-year-old man evaluated for possible dementia.  According to the wife was with the patient he has been forgetful but not significantly interfere his activities of daily living.  He is independent with activities of daily living and he is driving.  He has been forgetful for the last 3 to 4 years.  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  Is gotten lost driving a couple of times.  He has 2 children.  They did not say anything about his memory loss.  He has never had a stroke before.  He is very hard of hearing.    Objective   Vital Signs:   /65   Pulse 66   Ht 177.8 cm (70\")   Wt 99.2 kg (218 lb 12.8 oz)   BMI 31.39 kg/m²     Physical Exam   He is alert, fluent, phasic, follows commands well.  His Mini-Mental Status score is 26 out of 30. He missed 1 out of 3 recent objects, 2 out of 5 spelling backwards, pentagons.  Clock drawing is intact.  His MoCA score was 20 out of 30.  Visual patient executive 2 out of 5, naming 3 out of 3, attention 1 out of 2, 1 out of 1, 0-3, language 2 out of 2, 0 out of 1, abstraction 1 out of 2, delayed recall 3 out of 5, orientation 6 out of 6.  He can tell me the president [place] as well as the .  He can tell me the previous president as well as the previous  although he made a mistake and initially said Obama.  He can tell me the war in [place] with [place] invasion.  He can tell me where he used to work and what he did in detail.  He can tell me where he met his wife her birthday and her anniversary.  Visual fields full to confrontation, EOMs full all directions gaze, facial strength is full, soft palate elevation and tongue are normal.  There is no weakness of the upper or lower extremities individual muscle " testing.  Reflex are marked and symmetrical decrease in ankles.  Station gait is able to tiptoe, heel walk, demetrice without difficulty.  Heart is regular rhythm normal in rate.        Assessment and Plan  Diagnoses and all orders for this visit:    1. Mild cognitive impairment (Primary)  Assessment & Plan:  He has mild cognitive impairment.  I will do an MRI of the brain and I will see him again in 6 months time for follow-up.  I have placed a limitation in his activities at this time.         Total time spent with the patient and coordinating patient care was 45 minutes.    Follow Up  No follow-ups on file.  Patient was given instructions and counseling regarding his condition or for health maintenance advice. Please see specific information pulled into the AVS if appropriate.

## 2022-04-07 ENCOUNTER — OFFICE VISIT (OUTPATIENT)
Dept: PULMONOLOGY | Facility: CLINIC | Age: 77
End: 2022-04-07

## 2022-04-07 VITALS
OXYGEN SATURATION: 95 % | TEMPERATURE: 98.2 F | DIASTOLIC BLOOD PRESSURE: 64 MMHG | WEIGHT: 218 LBS | HEIGHT: 70 IN | BODY MASS INDEX: 31.21 KG/M2 | SYSTOLIC BLOOD PRESSURE: 118 MMHG | HEART RATE: 61 BPM | RESPIRATION RATE: 18 BRPM

## 2022-04-07 DIAGNOSIS — Z87.891 PERSONAL HISTORY OF NICOTINE DEPENDENCE: ICD-10-CM

## 2022-04-07 DIAGNOSIS — Z12.2 SCREENING FOR LUNG CANCER: Primary | ICD-10-CM

## 2022-04-07 DIAGNOSIS — J44.9 CHRONIC OBSTRUCTIVE PULMONARY DISEASE, UNSPECIFIED COPD TYPE: ICD-10-CM

## 2022-04-07 PROCEDURE — 99213 OFFICE O/P EST LOW 20 MIN: CPT | Performed by: INTERNAL MEDICINE

## 2022-04-07 NOTE — ASSESSMENT & PLAN NOTE
We will give samples of Spiriva today    Continue as needed albuterol    Patient is past due for low-dose lung cancer screening    I have ordered a low-dose lung cancer screening CT scan today    Patient is up-to-date on pulmonary specific vaccines including Covid vaccine with booster

## 2022-04-07 NOTE — PROGRESS NOTES
"Chief Complaint  Follow-up and COPD    Subjective          Frank Winter presents to [facility]ULMON[facility] CRIT[facility] CARE MEDICINE  History of Present Illness  Patient with COPD here for follow-up  Patient unable to afford medications has difficulties getting these filled  Relies a lot on samples  Feels that his breathing is better when he is taking his medications  No increased cough or increased sputum production  No fevers no chills no night sweats no weight loss no swollen or enlarged lymph nodes no hemoptysis  Objective   Vital Signs:   /64 (BP Location: Left arm, Patient Position: Sitting, Cuff Size: Adult)   Pulse 61   Temp 98.2 °F (36.8 °C)   Resp 18   Ht 177.8 cm (70\")   Wt 98.9 kg (218 lb)   SpO2 95%   BMI 31.28 kg/m²            Physical Exam   Vital Signs Reviewed  General WDWN, Alert, NAD.    Chest:  good aeration, clear to auscultation bilaterally, tympanic to percussion bilaterally, no work of breathing noted  CV: RRR, no MGR, pulses 2+, equal.  EXT:  no clubbing, no cyanosis, no edema, no joint tenderness  Neuro:  A&Ox3, CN grossly intact, no focal deficits.  Skin: No rashes or lesions noted  Result Review :                 Assessment and Plan    Diagnoses and all orders for this visit:    1. Screening for lung cancer (Primary)  -      CT Chest Low Dose Cancer Screening WO; Future    2. Personal history of nicotine dependence   -      CT Chest Low Dose Cancer Screening WO; Future    3. Chronic obstructive pulmonary disease, unspecified COPD type (HCC)  Assessment & Plan:  We will give samples of Spiriva today    Continue as needed albuterol    Patient is past due for low-dose lung cancer screening    I have ordered a low-dose lung cancer screening CT scan today    Patient is up-to-date on pulmonary specific vaccines including Covid vaccine with booster          Follow Up   Return in about 4 months (around 8/7/2022).  Patient was given instructions and counseling " regarding his condition or for health maintenance advice. Please see specific information pulled into the AVS if appropriate.

## 2022-04-14 ENCOUNTER — HOSPITAL ENCOUNTER (OUTPATIENT)
Dept: MRI IMAGING | Facility: HOSPITAL | Age: 77
Discharge: HOME | End: 2022-04-14
Admitting: PSYCHIATRY & NEUROLOGY

## 2022-04-14 DIAGNOSIS — G31.84 MILD COGNITIVE IMPAIRMENT: ICD-10-CM

## 2022-04-14 PROCEDURE — 70551 MRI BRAIN STEM W/O DYE: CPT

## 2022-04-14 RX ORDER — TIOTROPIUM BROMIDE INHALATION SPRAY 3.12 UG/1
2 SPRAY, METERED RESPIRATORY (INHALATION)
Qty: 2 EACH | Refills: 0 | COMMUNITY
Start: 2022-04-14 | End: 2022-09-01 | Stop reason: ALTCHOICE

## 2022-05-20 ENCOUNTER — OFFICE VISIT (OUTPATIENT)
Dept: FAMILY MEDICINE CLINIC | Facility: CLINIC | Age: 77
End: 2022-05-20

## 2022-05-20 VITALS
BODY MASS INDEX: 31.97 KG/M2 | OXYGEN SATURATION: 96 % | SYSTOLIC BLOOD PRESSURE: 136 MMHG | DIASTOLIC BLOOD PRESSURE: 80 MMHG | WEIGHT: 223.3 LBS | HEIGHT: 70 IN | TEMPERATURE: 98.2 F | HEART RATE: 66 BPM

## 2022-05-20 DIAGNOSIS — E78.2 MIXED HYPERLIPIDEMIA: ICD-10-CM

## 2022-05-20 DIAGNOSIS — I10 ESSENTIAL (PRIMARY) HYPERTENSION: Primary | ICD-10-CM

## 2022-05-20 DIAGNOSIS — N28.9 RENAL INSUFFICIENCY: ICD-10-CM

## 2022-05-20 DIAGNOSIS — Z12.11 SCREENING FOR COLON CANCER: ICD-10-CM

## 2022-05-20 DIAGNOSIS — E11.65 TYPE 2 DIABETES MELLITUS WITH HYPERGLYCEMIA, WITHOUT LONG-TERM CURRENT USE OF INSULIN: ICD-10-CM

## 2022-05-20 PROCEDURE — 99213 OFFICE O/P EST LOW 20 MIN: CPT | Performed by: NURSE PRACTITIONER

## 2022-05-20 RX ORDER — LISINOPRIL 20 MG/1
20 TABLET ORAL DAILY
Qty: 90 TABLET | Refills: 1 | Status: SHIPPED | OUTPATIENT
Start: 2022-05-20 | End: 2022-08-19 | Stop reason: SDUPTHER

## 2022-05-20 NOTE — PROGRESS NOTES
"Chief Complaint  Diabetes and Establish Care (Transfer from Dr. Mei)    Subjective          Frank Winter presents to [facility]  Patient presents to the office today for 6-month follow-up.  He is transitioning his care from Dr. Mei.  I did review recent lab work with the patient.  His A1c 6.9%.  I did explain that his GFR was slightly decreased and recommended him stop taking the hydrochlorothiazide. .  Did review his MRI results that neurology had ordered as patient was eating that he was never notified of the results.  Did explain to the patient he is due for his colonoscopy and this order will be placed.  He denies any new concerns or complaints.  He denies needing refills on his medications at this time.      Objective   Vital Signs:  /80 (BP Location: Right arm, Patient Position: Sitting, Cuff Size: Adult)   Pulse 66   Temp 98.2 °F (36.8 °C) (Temporal)   Ht 177.8 cm (70\")   Wt 101 kg (223 lb 4.8 oz)   SpO2 96%   BMI 32.04 kg/m²   BMI is >= 30 and <= 34.9 (Class 1 obesity). The following options were offered after discussion: nutrition counseling/recommendations      Physical Exam  Vitals reviewed.   Constitutional:       Appearance: Normal appearance.   Cardiovascular:      Rate and Rhythm: Normal rate and regular rhythm.      Heart sounds: Normal heart sounds, S1 normal and S2 normal. No murmur heard.  Pulmonary:      Effort: Pulmonary effort is normal. No respiratory distress.      Breath sounds: Normal breath sounds.   Skin:     General: Skin is warm and dry.   Neurological:      Mental Status: He is alert and oriented to person, place, and time.   Psychiatric:         Attention and Perception: Attention normal.         Mood and Affect: Mood normal.         Behavior: Behavior normal.        Result Review :                 Assessment and Plan    Diagnoses and all orders for this visit:    1. Essential (primary) hypertension (Primary)  -     CBC (No " Diff); Future  -     Comprehensive Metabolic Panel; Future  -     Lipid Panel; Future  -     lisinopril (PRINIVIL,ZESTRIL) 20 MG tablet; Take 1 tablet by mouth Daily.  Dispense: 90 tablet; Refill: 1    2. Mixed hyperlipidemia  -     Comprehensive Metabolic Panel; Future  -     Lipid Panel; Future    3. Type 2 diabetes mellitus with hyperglycemia, without long-term current use of insulin (HCC)  -     CBC (No Diff); Future  -     Comprehensive Metabolic Panel; Future  -     Lipid Panel; Future  -     Hemoglobin A1c; Future    4. Screening for colon cancer  -     Ambulatory Referral For Screening Colonoscopy    5. Renal insufficiency  -     Comprehensive Metabolic Panel; Future             Follow Up   Return in about 6 months (around 11/20/2022) for Recheck.  Patient was given instructions and counseling regarding his condition or for health maintenance advice. Please see specific information pulled into the AVS if appropriate.

## 2022-07-05 ENCOUNTER — TELEPHONE (OUTPATIENT)
Dept: PULMONOLOGY | Facility: CLINIC | Age: 77
End: 2022-07-05

## 2022-07-05 NOTE — TELEPHONE ENCOUNTER
Please call patient and let him know the CT that was ordered in April, Dr. Tee doesn't want done until October.  I'm not sure if scheduling was reaching out to him to schedule for October possibly?

## 2022-07-05 NOTE — TELEPHONE ENCOUNTER
Patient stated [facility] scheduling called in regards to the CT chest that was ordered in April. Patient said that [facility] stated he needed to call the office to reschedule. Patient is unsure of what it is he is needing rescheduled. The CT that was ordered in April was never done. Please call the patient back at [phone] in regards to this. Thank you.

## 2022-07-19 RX ORDER — TIOTROPIUM BROMIDE INHALATION SPRAY 3.12 UG/1
2 SPRAY, METERED RESPIRATORY (INHALATION)
Qty: 2 EACH | Refills: 0 | COMMUNITY
Start: 2022-07-19 | End: 2022-09-01 | Stop reason: ALTCHOICE

## 2022-08-15 ENCOUNTER — PREP FOR SURGERY (OUTPATIENT)
Dept: OTHER | Facility: HOSPITAL | Age: 77
End: 2022-08-15

## 2022-08-15 ENCOUNTER — CLINICAL SUPPORT (OUTPATIENT)
Dept: GASTROENTEROLOGY | Facility: CLINIC | Age: 77
End: 2022-08-15

## 2022-08-15 DIAGNOSIS — Z12.11 COLON CANCER SCREENING: Primary | ICD-10-CM

## 2022-08-15 RX ORDER — ATORVASTATIN CALCIUM 80 MG/1
80 TABLET, FILM COATED ORAL NIGHTLY
Qty: 90 TABLET | Refills: 1 | Status: SHIPPED | OUTPATIENT
Start: 2022-08-15 | End: 2022-11-21 | Stop reason: SDUPTHER

## 2022-08-15 NOTE — TELEPHONE ENCOUNTER
Caller: LIAM BASS    Relationship: Emergency Contact    Best call back number: [phone]    Requested Prescriptions:   Requested Prescriptions     Pending Prescriptions Disp Refills   • atorvastatin (LIPITOR) 80 MG tablet 90 tablet 1     Sig: Take 1 tablet by mouth Every Night.        Pharmacy where request should be sent: [facility] (NOW [facility]) - [address] - [phone]  - [phone] FX     Additional details provided by patient: PATIENT CURRENTLY HAS 4 LEFT. SENDING AS HIGH PRIORITY DUE TO IT BEING MAIL ORDER PHARMACY.     Does the patient have less than a 3 day supply:  [] Yes  [x] No    Reyna Quintero   08/15/22 13:02 EDT

## 2022-08-15 NOTE — PROGRESS NOTES
Frank Winter  REASON FOR CALL: Screening call, last colon 2019 with Dr. Griffin  SENT IN PREP: [place]  DOS: 2022    Past Medical History:   Diagnosis Date   • Anemia    • Arthritis    • CAD S/P percutaneous coronary angioplasty 2021    1 stent placed in    • Chronic bronchitis    • COPD     INHALERS   • Emphysema lung    • Essential hypertension 2021   • Heart attack    • Hemorrhoids     RECTAL PROB    • Hernia cerebri    • History of aortic dissection 2021   • Hyperlipidemia LDL goal <70 2021   • Melena      No Known Allergies  Past Surgical History:   Procedure Laterality Date   • COLONOSCOPY      Dr. Griffin   • HERNIA REPAIR          • ORTHOPEDIC SURGERY      [facility]     Social History     Socioeconomic History   • Marital status:    Tobacco Use   • Smoking status: Former Smoker     Packs/day: 1.00     Years: 53.00     Pack years: 53.00     Quit date: 2019     Years since quittin.7   • Smokeless tobacco: Never Used   Vaping Use   • Vaping Use: Never used   Substance and Sexual Activity   • Alcohol use: Never     Comment: UNKNOWN   • Drug use: Never   • Sexual activity: Defer     Family History   Problem Relation Age of Onset   • Heart disease Father    • Colon cancer Brother    • Cancer Brother        Current Outpatient Medications:   •  Accu-Chek Guide test strip, , Disp: , Rfl:   •  Accu-Chek Softclix Lancets lancets, , Disp: , Rfl:   •  albuterol sulfate  (90 Base) MCG/ACT inhaler, INHALE 2 PUFFS EVERY 4 HOURS AS NEEDED FOR SHORTNESS OF BREATH, Disp: 54 g, Rfl: 11  •  amLODIPine (NORVASC) 5 MG tablet, Take 1 tablet by mouth Daily., Disp: 90 tablet, Rfl: 3  •  aspirin 81 MG EC tablet, aspirin 81 mg oral tablet,delayed release (DR/EC) take 1 tablet (81 mg) by oral route once daily   Active, Disp: , Rfl:   •  atorvastatin (LIPITOR) 80 MG tablet, Take 1 tablet by mouth Every Night., Disp: 90 tablet, Rfl: 1  •  cyanocobalamin 1000 MCG/ML injection,  cyanocobalamin (vitamin B-12) 1,000 mcg/mL injection solution Inject 1 ml every 2 weeks.   Suspended, Disp: , Rfl:   •  ezetimibe (Zetia) 10 MG tablet, Take 1 tablet by mouth Daily., Disp: 90 tablet, Rfl: 1  •  lisinopril (PRINIVIL,ZESTRIL) 20 MG tablet, Take 1 tablet by mouth Daily., Disp: 90 tablet, Rfl: 1  •  metFORMIN (GLUCOPHAGE) 500 MG tablet, TAKE ONE TABLET BY MOUTH TWICE A DAY WITH MEALS, Disp: 180 tablet, Rfl: 1  •  tiotropium bromide monohydrate (Spiriva Respimat) 2.5 MCG/ACT aerosol solution inhaler, Inhale 2 puffs Daily., Disp: 2 each, Rfl: 0  •  metoprolol succinate XL (TOPROL-XL) 100 MG 24 hr tablet, TAKE ONE TABLET BY MOUTH EVERY 24 HOURS, Disp: 90 tablet, Rfl: 2  •  omega-3 acid ethyl esters (LOVAZA) 1 g capsule, Take 2 capsules by mouth 2 (Two) Times a Day for 90 days. 2 AM and 2PM, Disp: 360 capsule, Rfl: 1  •  pantoprazole (PROTONIX) 40 MG EC tablet, Take 1 tablet by mouth Daily., Disp: 90 tablet, Rfl: 2  •  sertraline (Zoloft) 50 MG tablet, Take 1 tablet by mouth Daily., Disp: 90 tablet, Rfl: 1  •  tiotropium bromide monohydrate (Spiriva Respimat) 2.5 MCG/ACT aerosol solution inhaler, Inhale 2 puffs Daily., Disp: 2 each, Rfl: 0

## 2022-08-16 ENCOUNTER — HOSPITAL ENCOUNTER (OUTPATIENT)
Dept: CT IMAGING | Facility: HOSPITAL | Age: 77
Discharge: HOME | End: 2022-08-16
Admitting: INTERNAL MEDICINE

## 2022-08-16 DIAGNOSIS — Z87.891 PERSONAL HISTORY OF NICOTINE DEPENDENCE: ICD-10-CM

## 2022-08-16 DIAGNOSIS — Z12.2 SCREENING FOR LUNG CANCER: ICD-10-CM

## 2022-08-16 PROCEDURE — 71271 CT THORAX LUNG CANCER SCR C-: CPT

## 2022-08-17 RX ORDER — TIOTROPIUM BROMIDE INHALATION SPRAY 3.12 UG/1
2 SPRAY, METERED RESPIRATORY (INHALATION)
Qty: 2 EACH | Refills: 0 | COMMUNITY
Start: 2022-08-17 | End: 2022-09-01 | Stop reason: ALTCHOICE

## 2022-08-19 DIAGNOSIS — I10 ESSENTIAL (PRIMARY) HYPERTENSION: ICD-10-CM

## 2022-08-19 RX ORDER — LISINOPRIL 20 MG/1
20 TABLET ORAL DAILY
Qty: 90 TABLET | Refills: 1 | Status: SHIPPED | OUTPATIENT
Start: 2022-08-19 | End: 2022-09-27 | Stop reason: SDUPTHER

## 2022-08-19 RX ORDER — OMEGA-3-ACID ETHYL ESTERS 1 G/1
2 CAPSULE, LIQUID FILLED ORAL 2 TIMES DAILY
Qty: 360 CAPSULE | Refills: 1 | Status: SHIPPED | OUTPATIENT
Start: 2022-08-19 | End: 2022-11-17

## 2022-08-19 RX ORDER — EZETIMIBE 10 MG/1
10 TABLET ORAL DAILY
Qty: 90 TABLET | Refills: 1 | Status: SHIPPED | OUTPATIENT
Start: 2022-08-19 | End: 2022-11-21 | Stop reason: SDUPTHER

## 2022-08-19 RX ORDER — PANTOPRAZOLE SODIUM 40 MG/1
40 TABLET, DELAYED RELEASE ORAL DAILY
Qty: 90 TABLET | Refills: 2 | Status: SHIPPED | OUTPATIENT
Start: 2022-08-19

## 2022-08-19 NOTE — TELEPHONE ENCOUNTER
Caller: Frank Winter    Relationship: Self    Best call back number: [phone]    Requested Prescriptions:   Requested Prescriptions     Pending Prescriptions Disp Refills   • sertraline (Zoloft) 50 MG tablet 90 tablet 1     Sig: Take 1 tablet by mouth Daily.   • pantoprazole (PROTONIX) 40 MG EC tablet 90 tablet 2     Sig: Take 1 tablet by mouth Daily.   • lisinopril (PRINIVIL,ZESTRIL) 20 MG tablet 90 tablet 1     Sig: Take 1 tablet by mouth Daily.   • ezetimibe (Zetia) 10 MG tablet 90 tablet 1     Sig: Take 1 tablet by mouth Daily.   • omega-3 acid ethyl esters (LOVAZA) 1 g capsule 360 capsule 1     Sig: Take 2 capsules by mouth 2 (Two) Times a Day for 90 days. 2 AM and 2PM        Pharmacy where request should be sent: [facility] (NOW [facility]) - [facility] [address] - [phone]  - [phone] FX     Does the patient have less than a 3 day supply:  [x] Yes  [] No    Reyna Parr Rep   08/19/22 08:57 EDT

## 2022-08-22 ENCOUNTER — OFFICE VISIT (OUTPATIENT)
Dept: PULMONOLOGY | Facility: CLINIC | Age: 77
End: 2022-08-22

## 2022-08-22 VITALS
HEIGHT: 70 IN | DIASTOLIC BLOOD PRESSURE: 77 MMHG | HEART RATE: 61 BPM | SYSTOLIC BLOOD PRESSURE: 144 MMHG | OXYGEN SATURATION: 97 % | TEMPERATURE: 98.6 F | WEIGHT: 220 LBS | BODY MASS INDEX: 31.5 KG/M2 | RESPIRATION RATE: 18 BRPM

## 2022-08-22 DIAGNOSIS — J44.9 CHRONIC OBSTRUCTIVE PULMONARY DISEASE, UNSPECIFIED COPD TYPE: Primary | ICD-10-CM

## 2022-08-22 PROCEDURE — 99213 OFFICE O/P EST LOW 20 MIN: CPT | Performed by: INTERNAL MEDICINE

## 2022-08-22 NOTE — PROGRESS NOTES
"Chief Complaint  COPD, Chronic respiratory failure with hypoxia  , and Follow-up (4 Month )    Subjective        Frank Winter presents to [facility]  History of Present Illness  77-year-old male with COPD here for follow-up  Unable to take Symbicort due to price  Has no cough  Has baseline shortness of breath  Spiriva and albuterol helping  Objective   Vital Signs:  /77 (BP Location: Left arm, Patient Position: Sitting, Cuff Size: Large Adult)   Pulse 61   Temp 98.6 °F (37 °C) (Temporal)   Resp 18   Ht 177.8 cm (70\")   Wt 99.8 kg (220 lb)   SpO2 97% Comment: room air  BMI 31.57 kg/m²   Estimated body mass index is 31.57 kg/m² as calculated from the following:    Height as of this encounter: 177.8 cm (70\").    Weight as of this encounter: 99.8 kg (220 lb).          Physical Exam   Vital Signs Reviewed  General WDWN, Alert, NAD.    HEENT:  PERRL, EOMI.  OP, nares clear, no sinus tenderness  Neck:  Supple, no JVD, no thyromegaly  Lymph: no axillary, cervical, supraclavicular lymphadenopathy noted bilaterally  Chest:  good aeration, clear to auscultation bilaterally, tympanic to percussion bilaterally, no work of breathing noted  CV: RRR, no MGR, pulses 2+, equal.  Abd:  Soft, NT, ND, + BS, no HSM  EXT:  no clubbing, no cyanosis, no edema, no joint tenderness  Neuro:  A&Ox3, CN grossly intact, no focal deficits.  Skin: No rashes or lesions noted  Result Review :                Assessment and Plan   Diagnoses and all orders for this visit:    1. Chronic obstructive pulmonary disease, unspecified COPD type (HCC) (Primary)  Assessment & Plan:  We will continue albuterol    We will sample Spiriva today    Unable to afford Symbicort    Would like to give patient sample Breztri he so he can be treated with all 3 classes of medications however he is unable to afford his and we do not currently have any samples    Stressed importance of daily exercise with " the patient    Is up-to-date on all pulmonary specific vaccines    Had low-dose lung cancer screening CT scan last week August 2022 showing no concerning pulmonary nodules               Follow Up   Return in about 6 months (around 2/22/2023).  Patient was given instructions and counseling regarding his condition or for health maintenance advice. Please see specific information pulled into the AVS if appropriate.

## 2022-08-22 NOTE — ASSESSMENT & PLAN NOTE
We will continue albuterol    We will sample Spiriva today    Unable to afford Symbicort    Would like to give patient sample Breztri he so he can be treated with all 3 classes of medications however he is unable to afford his and we do not currently have any samples    Stressed importance of daily exercise with the patient    Is up-to-date on all pulmonary specific vaccines    Had low-dose lung cancer screening CT scan last week August 2022 showing no concerning pulmonary nodules

## 2022-09-01 ENCOUNTER — OFFICE VISIT (OUTPATIENT)
Dept: CARDIOLOGY | Facility: CLINIC | Age: 77
End: 2022-09-01

## 2022-09-01 VITALS
WEIGHT: 219 LBS | BODY MASS INDEX: 31.35 KG/M2 | HEART RATE: 62 BPM | SYSTOLIC BLOOD PRESSURE: 137 MMHG | HEIGHT: 70 IN | DIASTOLIC BLOOD PRESSURE: 72 MMHG

## 2022-09-01 DIAGNOSIS — Z98.61 CAD S/P PERCUTANEOUS CORONARY ANGIOPLASTY: Primary | ICD-10-CM

## 2022-09-01 DIAGNOSIS — I25.10 CAD S/P PERCUTANEOUS CORONARY ANGIOPLASTY: Primary | ICD-10-CM

## 2022-09-01 DIAGNOSIS — E78.5 HYPERLIPIDEMIA LDL GOAL <70: ICD-10-CM

## 2022-09-01 DIAGNOSIS — I10 ESSENTIAL HYPERTENSION: ICD-10-CM

## 2022-09-01 PROCEDURE — 99214 OFFICE O/P EST MOD 30 MIN: CPT | Performed by: INTERNAL MEDICINE

## 2022-09-01 RX ORDER — BUDESONIDE, GLYCOPYRROLATE, AND FORMOTEROL FUMARATE 160; 9; 4.8 UG/1; UG/1; UG/1
2 AEROSOL, METERED RESPIRATORY (INHALATION) 2 TIMES DAILY
COMMUNITY
End: 2023-03-16

## 2022-09-01 NOTE — ASSESSMENT & PLAN NOTE
LDL still is above goal recommend a trial of Repatha if patient is able to afford would discontinue the Zetia and will repeat lipids and LFTs on next visit

## 2022-09-01 NOTE — PROGRESS NOTES
Chief Complaint  Hypertension and Hyperlipidemia    Subjective    Patient has not been have any problem chest pain shortness of breath or other issues    Past Medical History:   Diagnosis Date   • Anemia    • Arthritis    • CAD S/P percutaneous coronary angioplasty 8/23/2021    1 stent placed in 2011   • Chronic bronchitis    • COPD     INHALERS   • Emphysema lung    • Essential hypertension 8/23/2021   • Heart attack 2011   • Hemorrhoids     RECTAL PROB    • Hernia cerebri    • History of aortic dissection 8/23/2021   • Hyperlipidemia LDL goal <70 8/23/2021   • Melena          Current Outpatient Medications:   •  Accu-Chek Guide test strip, , Disp: , Rfl:   •  Accu-Chek Softclix Lancets lancets, , Disp: , Rfl:   •  albuterol sulfate  (90 Base) MCG/ACT inhaler, INHALE 2 PUFFS EVERY 4 HOURS AS NEEDED FOR SHORTNESS OF BREATH, Disp: 54 g, Rfl: 11  •  amLODIPine (NORVASC) 5 MG tablet, Take 1 tablet by mouth Daily., Disp: 90 tablet, Rfl: 3  •  aspirin 81 MG EC tablet, aspirin 81 mg oral tablet,delayed release (DR/EC) take 1 tablet (81 mg) by oral route once daily   Active, Disp: , Rfl:   •  atorvastatin (LIPITOR) 80 MG tablet, Take 1 tablet by mouth Every Night., Disp: 90 tablet, Rfl: 1  •  Budeson-Glycopyrrol-Formoterol (Breztri Aerosphere) 160-9-4.8 MCG/ACT aerosol inhaler, Inhale 2 puffs 2 (Two) Times a Day., Disp: , Rfl:   •  Cyanocobalamin ([employer] VITAMIN B12 PO), Take  by mouth., Disp: , Rfl:   •  ezetimibe (Zetia) 10 MG tablet, Take 1 tablet by mouth Daily., Disp: 90 tablet, Rfl: 1  •  lisinopril (PRINIVIL,ZESTRIL) 20 MG tablet, Take 1 tablet by mouth Daily., Disp: 90 tablet, Rfl: 1  •  metFORMIN (GLUCOPHAGE) 500 MG tablet, TAKE ONE TABLET BY MOUTH TWICE A DAY WITH MEALS, Disp: 180 tablet, Rfl: 1  •  metoprolol succinate XL (TOPROL-XL) 100 MG 24 hr tablet, TAKE ONE TABLET BY MOUTH EVERY 24 HOURS, Disp: 90 tablet, Rfl: 2  •  omega-3 acid ethyl esters (LOVAZA) 1 g capsule, Take 2 capsules by mouth 2 (Two) Times  "a Day for 90 days. 2 AM and 2PM, Disp: 360 capsule, Rfl: 1  •  pantoprazole (PROTONIX) 40 MG EC tablet, Take 1 tablet by mouth Daily., Disp: 90 tablet, Rfl: 2  •  polyethylene glycol (GoLYTELY) 236 g solution, Starting at noon on day prior to procedure, drink 8 ounces every 30 minutes until all gone or stools are clear. May add flavor packet., Disp: 4000 mL, Rfl: 0  •  sertraline (Zoloft) 50 MG tablet, Take 1 tablet by mouth Daily., Disp: 90 tablet, Rfl: 1  •  Evolocumab (REPATHA) solution auto-injector SureClick injection, Inject 1 mL under the skin into the appropriate area as directed Every 14 (Fourteen) Days., Disp: 6 mL, Rfl: 3    Medications Discontinued During This Encounter   Medication Reason   • cyanocobalamin 1000 MCG/ML injection Alternate therapy   • tiotropium bromide monohydrate (Spiriva Respimat) 2.5 MCG/ACT aerosol solution inhaler Alternate therapy   • tiotropium bromide monohydrate (Spiriva Respimat) 2.5 MCG/ACT aerosol solution inhaler Alternate therapy   • tiotropium bromide monohydrate (Spiriva Respimat) 2.5 MCG/ACT aerosol solution inhaler Alternate therapy     No Known Allergies     Social History     Tobacco Use   • Smoking status: Former Smoker     Packs/day: 1.00     Years: 53.00     Pack years: 53.00     Quit date: 2019     Years since quittin.8   • Smokeless tobacco: Never Used   Vaping Use   • Vaping Use: Never used   Substance Use Topics   • Alcohol use: Never   • Drug use: Never       Family History   Problem Relation Age of Onset   • Heart disease Father    • Colon cancer Brother    • Cancer Brother         Objective     /72   Pulse 62   Ht 177.8 cm (70\")   Wt 99.3 kg (219 lb)   BMI 31.42 kg/m²       Physical Exam    General Appearance:   · no acute distress  · Alert and oriented x3  HENT:   · lips not cyanotic  · Atraumatic  Neck:  · No jvd   · supple  Respiratory:  · no respiratory distress  · normal breath sounds  · no rales  Cardiovascular:  · Regular rate and " rhythm  · no S3, no S4   · no murmur  · no rub  Extremities  · No cyanosis  · lower extremity edema: none    Skin:   · warm, dry  · No rashes      Result Review :     No results found for: PROBNP                     No results found for: TSH   No results found for: FREET4   No results found for: DDIMERQUANT  Magnesium   Date Value Ref Range Status   11/20/2019 2.25 1.60 - 2.30 mg/dL Final      No results found for: DIGOXIN   Lab Results   Component Value Date    TROPONINT <0.01 11/17/2019             No results found for: POCTROP                   Diagnoses and all orders for this visit:    1. CAD S/P percutaneous coronary angioplasty (Primary)  Assessment & Plan:  Patient is doing well no ongoing angina continue with chronic aspirin 81 mg daily        2. Essential hypertension  Assessment & Plan:  Well-controlled continue with Toprol 100, amlodipine 5 daily and lisinopril 20 mg once a day      3. Hyperlipidemia LDL goal <70  Assessment & Plan:  LDL still is above goal recommend a trial of Repatha if patient is able to afford would discontinue the Zetia and will repeat lipids and LFTs on next visit    Orders:  -     Lipid Panel; Future  -     Hepatic Function Panel; Future    Other orders  -     Evolocumab (REPATHA) solution auto-injector SureClick injection; Inject 1 mL under the skin into the appropriate area as directed Every 14 (Fourteen) Days.  Dispense: 6 mL; Refill: 3          Follow Up     Return in about 6 months (around 3/1/2023) for EKG with F/U.          Patient was given instructions and counseling regarding his condition or for health maintenance advice. Please see specific information pulled into the AVS if appropriate.

## 2022-09-02 RX ORDER — TIOTROPIUM BROMIDE INHALATION SPRAY 3.12 UG/1
2 SPRAY, METERED RESPIRATORY (INHALATION)
Qty: 2 EACH | Refills: 0 | COMMUNITY
Start: 2022-09-02 | End: 2023-03-16

## 2022-09-12 RX ORDER — METOPROLOL SUCCINATE 100 MG/1
TABLET, EXTENDED RELEASE ORAL
Qty: 90 TABLET | Refills: 3 | Status: SHIPPED | OUTPATIENT
Start: 2022-09-12

## 2022-09-19 ENCOUNTER — TELEPHONE (OUTPATIENT)
Dept: CARDIOLOGY | Facility: CLINIC | Age: 77
End: 2022-09-19

## 2022-09-19 NOTE — TELEPHONE ENCOUNTER
The PA for Repatha was approved.     PA Case: [id number], Status: Approved, Coverage Starts on: 9/19/2022 12:00:00 AM, Coverage Ends on: 3/18/2023 12:00:00 AM. Questions? Contact [phone].

## 2022-09-27 ENCOUNTER — TELEPHONE (OUTPATIENT)
Dept: FAMILY MEDICINE CLINIC | Facility: CLINIC | Age: 77
End: 2022-09-27

## 2022-09-27 DIAGNOSIS — I10 ESSENTIAL (PRIMARY) HYPERTENSION: ICD-10-CM

## 2022-09-27 RX ORDER — LISINOPRIL 40 MG/1
40 TABLET ORAL DAILY
Qty: 90 TABLET | Refills: 3 | Status: SHIPPED | OUTPATIENT
Start: 2022-09-27 | End: 2023-03-16

## 2022-09-27 RX ORDER — AMLODIPINE BESYLATE 10 MG/1
10 TABLET ORAL DAILY
Qty: 90 TABLET | Refills: 1 | Status: SHIPPED | OUTPATIENT
Start: 2022-09-27 | End: 2022-11-21 | Stop reason: SDUPTHER

## 2022-09-27 NOTE — TELEPHONE ENCOUNTER
Pt called, his blood pressure has been 159/115, 168/84, and 163/81 this morning. Spoke with provider and pt needs to double his amlodipine to 10 mg. Pt also reports he has been taking 2 of his lisinopril medication making it 40mg.

## 2022-11-08 RX ORDER — TIOTROPIUM BROMIDE INHALATION SPRAY 3.12 UG/1
2 SPRAY, METERED RESPIRATORY (INHALATION)
Qty: 2 EACH | Refills: 0 | COMMUNITY
Start: 2022-11-08 | End: 2023-03-16

## 2022-11-14 ENCOUNTER — TELEPHONE (OUTPATIENT)
Dept: GASTROENTEROLOGY | Facility: CLINIC | Age: 77
End: 2022-11-14

## 2022-11-14 NOTE — TELEPHONE ENCOUNTER
I spoke with Mr Winter, confirmed colonoscopy on 11.28.22, arrival time of 10:00am. Reminded of liquid diet the day prior. Reminded of bowel prep and instructions. Voiced understanding. brie

## 2022-11-21 ENCOUNTER — OFFICE VISIT (OUTPATIENT)
Dept: FAMILY MEDICINE CLINIC | Facility: CLINIC | Age: 77
End: 2022-11-21

## 2022-11-21 VITALS
OXYGEN SATURATION: 98 % | TEMPERATURE: 97.8 F | WEIGHT: 228.8 LBS | SYSTOLIC BLOOD PRESSURE: 128 MMHG | HEART RATE: 69 BPM | DIASTOLIC BLOOD PRESSURE: 72 MMHG | HEIGHT: 70 IN | BODY MASS INDEX: 32.75 KG/M2

## 2022-11-21 DIAGNOSIS — E78.2 MIXED HYPERLIPIDEMIA: ICD-10-CM

## 2022-11-21 DIAGNOSIS — N28.9 RENAL INSUFFICIENCY: ICD-10-CM

## 2022-11-21 DIAGNOSIS — F41.9 ANXIETY: ICD-10-CM

## 2022-11-21 DIAGNOSIS — Z23 NEED FOR INFLUENZA VACCINATION: ICD-10-CM

## 2022-11-21 DIAGNOSIS — E11.65 TYPE 2 DIABETES MELLITUS WITH HYPERGLYCEMIA, WITHOUT LONG-TERM CURRENT USE OF INSULIN: Primary | ICD-10-CM

## 2022-11-21 DIAGNOSIS — I10 ESSENTIAL (PRIMARY) HYPERTENSION: ICD-10-CM

## 2022-11-21 LAB
EXPIRATION DATE: NORMAL
HBA1C MFR BLD: 5.7 %
Lab: NORMAL

## 2022-11-21 PROCEDURE — 83036 HEMOGLOBIN GLYCOSYLATED A1C: CPT | Performed by: NURSE PRACTITIONER

## 2022-11-21 PROCEDURE — G0008 ADMIN INFLUENZA VIRUS VAC: HCPCS | Performed by: NURSE PRACTITIONER

## 2022-11-21 PROCEDURE — 90686 IIV4 VACC NO PRSV 0.5 ML IM: CPT | Performed by: NURSE PRACTITIONER

## 2022-11-21 PROCEDURE — 99214 OFFICE O/P EST MOD 30 MIN: CPT | Performed by: NURSE PRACTITIONER

## 2022-11-21 PROCEDURE — 3044F HG A1C LEVEL LT 7.0%: CPT | Performed by: NURSE PRACTITIONER

## 2022-11-21 RX ORDER — AMLODIPINE BESYLATE 10 MG/1
10 TABLET ORAL DAILY
Qty: 90 TABLET | Refills: 1 | Status: SHIPPED | OUTPATIENT
Start: 2022-11-21 | End: 2023-03-16

## 2022-11-21 RX ORDER — ATORVASTATIN CALCIUM 80 MG/1
80 TABLET, FILM COATED ORAL NIGHTLY
Qty: 90 TABLET | Refills: 1 | Status: ON HOLD | OUTPATIENT
Start: 2022-11-21 | End: 2022-11-28

## 2022-11-21 RX ORDER — EZETIMIBE 10 MG/1
10 TABLET ORAL DAILY
Qty: 90 TABLET | Refills: 1 | Status: SHIPPED | OUTPATIENT
Start: 2022-11-21

## 2022-11-21 NOTE — PROGRESS NOTES
"Chief Complaint  Diabetes (6 month follow up )    Subjective         Frank Winter presents to [facility]  Patient presents to the office today for 6-month follow-up.  I did explain to the patient he is due for routine lab work.  We did check an A1c in the office which was 5.7%.  Patient states he has not been taking his metformin.  I did discuss maintaining a diet controlled treatment plan.  Patient states that there was some confusion regarding his lisinopril.  I explained that I remove the hydrochlorothiazide from his lisinopril due to his kidney function.  I did explain that we would reevaluate this with his current lab work.  He does state that he is needing refills on all of his medicines.  Blood pressure is 120/72.  He denies any chest pain shortness breath palpitations this time.       Objective     Vitals:    11/21/22 0926   BP: 128/72   BP Location: Right arm   Patient Position: Sitting   Cuff Size: Adult   Pulse: 69   Temp: 97.8 °F (36.6 °C)   TempSrc: Temporal   SpO2: 98%   Weight: 104 kg (228 lb 12.8 oz)   Height: 177.8 cm (70\")      Body mass index is 32.83 kg/m².    BMI is >= 30 and <35. (Class 1 Obesity). The following options were offered after discussion;: nutrition counseling/recommendations        Physical Exam  Vitals reviewed.   Constitutional:       Appearance: Normal appearance.   Cardiovascular:      Rate and Rhythm: Normal rate and regular rhythm.      Pulses: Normal pulses.      Heart sounds: Normal heart sounds, S1 normal and S2 normal. No murmur heard.  Pulmonary:      Effort: Pulmonary effort is normal. No respiratory distress.      Breath sounds: Normal breath sounds.   Skin:     General: Skin is warm and dry.   Neurological:      Mental Status: He is alert and oriented to person, place, and time.   Psychiatric:         Attention and Perception: Attention normal.         Mood and Affect: Mood normal.         Behavior: Behavior normal.      "     Result Review :   The following data was reviewed by: MYKEL Gaxiola on 11/21/2022:      Procedures    Assessment and Plan   Diagnoses and all orders for this visit:    1. Type 2 diabetes mellitus with hyperglycemia, without long-term current use of insulin (HCC) (Primary)  -     POC Glycosylated Hemoglobin (Hb A1C)  -     Cancel: CBC & Differential; Future  -     Cancel: Comprehensive Metabolic Panel; Future  -     Cancel: Hemoglobin A1c; Future  -     Cancel: Lipid Panel; Future  -     Cancel: MicroAlbumin, Urine, Random - Urine, Clean Catch; Future  -     CBC & Differential; Future  -     Comprehensive Metabolic Panel; Future  -     Hemoglobin A1c; Future  -     Lipid Panel; Future  -     MicroAlbumin, Urine, Random - Urine, Clean Catch; Future    2. Mixed hyperlipidemia  -     Cancel: CBC & Differential; Future  -     Cancel: Comprehensive Metabolic Panel; Future  -     Cancel: Lipid Panel; Future  -     CBC & Differential; Future  -     Comprehensive Metabolic Panel; Future  -     Lipid Panel; Future  -     atorvastatin (LIPITOR) 80 MG tablet; Take 1 tablet by mouth Every Night.  Dispense: 90 tablet; Refill: 1  -     ezetimibe (Zetia) 10 MG tablet; Take 1 tablet by mouth Daily.  Dispense: 90 tablet; Refill: 1    3. Essential (primary) hypertension  -     Cancel: CBC & Differential; Future  -     Cancel: Comprehensive Metabolic Panel; Future  -     CBC & Differential; Future  -     Comprehensive Metabolic Panel; Future  -     amLODIPine (NORVASC) 10 MG tablet; Take 1 tablet by mouth Daily.  Dispense: 90 tablet; Refill: 1    4. Renal insufficiency  -     Cancel: Comprehensive Metabolic Panel; Future  -     Comprehensive Metabolic Panel; Future    5. Anxiety  -     sertraline (Zoloft) 50 MG tablet; Take 1 tablet by mouth Daily.  Dispense: 90 tablet; Refill: 1    6. Need for influenza vaccination  -     FluLaval/Fluarix/Fluzone >6 Months          Follow Up   Return in about 6 months (around 5/21/2023) for  Recheck.  Patient was given instructions and counseling regarding his condition or for health maintenance advice. Please see specific information pulled into the AVS if appropriate.

## 2022-11-28 ENCOUNTER — ANESTHESIA EVENT (OUTPATIENT)
Dept: GASTROENTEROLOGY | Facility: HOSPITAL | Age: 77
End: 2022-11-28

## 2022-11-28 ENCOUNTER — HOSPITAL ENCOUNTER (OUTPATIENT)
Facility: HOSPITAL | Age: 77
Setting detail: HOSPITAL OUTPATIENT SURGERY
Discharge: HOME | End: 2022-11-28
Attending: INTERNAL MEDICINE | Admitting: INTERNAL MEDICINE

## 2022-11-28 ENCOUNTER — ANESTHESIA (OUTPATIENT)
Dept: GASTROENTEROLOGY | Facility: HOSPITAL | Age: 77
End: 2022-11-28

## 2022-11-28 VITALS
SYSTOLIC BLOOD PRESSURE: 144 MMHG | OXYGEN SATURATION: 98 % | DIASTOLIC BLOOD PRESSURE: 76 MMHG | HEART RATE: 67 BPM | BODY MASS INDEX: 32.46 KG/M2 | WEIGHT: 226.19 LBS | TEMPERATURE: 97.9 F | RESPIRATION RATE: 20 BRPM

## 2022-11-28 PROCEDURE — 25010000002 PROPOFOL 10 MG/ML EMULSION: Performed by: NURSE ANESTHETIST, CERTIFIED REGISTERED

## 2022-11-28 PROCEDURE — G0105 COLORECTAL SCRN; HI RISK IND: HCPCS | Performed by: INTERNAL MEDICINE

## 2022-11-28 RX ORDER — PROPOFOL 10 MG/ML
VIAL (ML) INTRAVENOUS AS NEEDED
Status: DISCONTINUED | OUTPATIENT
Start: 2022-11-28 | End: 2022-11-28 | Stop reason: SURG

## 2022-11-28 RX ORDER — SODIUM CHLORIDE, SODIUM LACTATE, POTASSIUM CHLORIDE, CALCIUM CHLORIDE 600; 310; 30; 20 MG/100ML; MG/100ML; MG/100ML; MG/100ML
30 INJECTION, SOLUTION INTRAVENOUS CONTINUOUS
Status: DISCONTINUED | OUTPATIENT
Start: 2022-11-28 | End: 2022-11-28 | Stop reason: HOSPADM

## 2022-11-28 RX ORDER — LIDOCAINE HYDROCHLORIDE 20 MG/ML
INJECTION, SOLUTION EPIDURAL; INFILTRATION; INTRACAUDAL; PERINEURAL AS NEEDED
Status: DISCONTINUED | OUTPATIENT
Start: 2022-11-28 | End: 2022-11-28 | Stop reason: SURG

## 2022-11-28 RX ADMIN — PROPOFOL 175 MCG/KG/MIN: 10 INJECTION, EMULSION INTRAVENOUS at 12:53

## 2022-11-28 RX ADMIN — SODIUM CHLORIDE, POTASSIUM CHLORIDE, SODIUM LACTATE AND CALCIUM CHLORIDE 30 ML/HR: 600; 310; 30; 20 INJECTION, SOLUTION INTRAVENOUS at 12:07

## 2022-11-28 RX ADMIN — LIDOCAINE HYDROCHLORIDE 50 MG: 20 INJECTION, SOLUTION EPIDURAL; INFILTRATION; INTRACAUDAL; PERINEURAL at 12:53

## 2022-11-28 RX ADMIN — PROPOFOL 100 MG: 10 INJECTION, EMULSION INTRAVENOUS at 12:53

## 2022-11-28 NOTE — ANESTHESIA POSTPROCEDURE EVALUATION
Patient: Frank Winter    Procedure Summary     Date: 11/28/22 Room / Location: [facility] ENDOSCOPY 5 / [facility] ENDOSCOPY    Anesthesia Start: 1249 Anesthesia Stop: 1318    Procedure: COLONOSCOPY Diagnosis:       Colon cancer screening      (Colon cancer screening [Z12.11])    Surgeons: Amado Griffin MD Provider: Navjot Lucas MD    Anesthesia Type: general ASA Status: 3          Anesthesia Type: general    Vitals  Vitals Value Taken Time   /76 11/28/22 1339   Temp 36.6 °C (97.9 °F) 11/28/22 1319   Pulse 67 11/28/22 1340   Resp 20 11/28/22 1339   SpO2 95 % 11/28/22 1340   Vitals shown include unvalidated device data.        Post Anesthesia Care and Evaluation    Patient location during evaluation: bedside  Patient participation: complete - patient participated  Level of consciousness: awake  Pain management: adequate    Airway patency: patent  Anesthetic complications: No anesthetic complications  PONV Status: none  Cardiovascular status: acceptable and stable  Respiratory status: acceptable  Hydration status: acceptable    Comments: An Anesthesiologist personally participated in the most demanding procedures (including induction and emergence if applicable) in the anesthesia plan, monitored the course of anesthesia administration at frequent intervals and remained physically present and available for immediate diagnosis and treatment of emergencies.

## 2022-11-28 NOTE — ANESTHESIA PREPROCEDURE EVALUATION
Anesthesia Evaluation     Patient summary reviewed and Nursing notes reviewed   no history of anesthetic complications:  NPO Solid Status: > 8 hours  NPO Liquid Status: > 2 hours           Airway   Mallampati: II  TM distance: >3 FB  Neck ROM: full  No difficulty expected  Dental    (+) edentulous    Pulmonary - normal exam    breath sounds clear to auscultation  (+) COPD,   Cardiovascular - normal exam  Exercise tolerance: good (4-7 METS)    Rhythm: regular  Rate: normal    (+) hypertension, past MI , CAD, dysrhythmias Atrial Fib, hyperlipidemia,       Neuro/Psych- negative ROS  GI/Hepatic/Renal/Endo - negative ROS     Musculoskeletal     Abdominal    Substance History - negative use     OB/GYN negative ob/gyn ROS         Other   arthritis,      ROS/Med Hx Other: PAT Nursing Notes unavailable.          ekg 2/24/22  Comparison: compared with previous ECG   Similar to previous ECG   Rhythm: sinus rhythm   Conduction: right bundle branch block and left posterior fascicular block     IMPRESSION:  echo 11/18/19  1.  Technically difficult images.    2.  Grossly hyperdynamic ejection fraction of greater than 60%.    3.  Mild aortic stenosis.    4.  Trace tricuspid regurgitation with normal estimated PA pressures.             Anesthesia Plan    ASA 3     general     (Total IV Anesthesia    Patient understands anesthesia not responsible for dental damage.    Pt no longer in afib, sees cardiologist, last visit within last 6 months)  intravenous induction     Anesthetic plan, risks, benefits, and alternatives have been provided, discussed and informed consent has been obtained with: patient.    Plan discussed with CRNA.        CODE STATUS:

## 2022-11-29 RX ORDER — TIOTROPIUM BROMIDE INHALATION SPRAY 3.12 UG/1
2 SPRAY, METERED RESPIRATORY (INHALATION)
Qty: 2 EACH | Refills: 0 | COMMUNITY
Start: 2022-11-29 | End: 2023-03-16

## 2022-11-30 ENCOUNTER — TELEPHONE (OUTPATIENT)
Dept: GASTROENTEROLOGY | Facility: CLINIC | Age: 77
End: 2022-11-30

## 2022-11-30 NOTE — TELEPHONE ENCOUNTER
I have reviewed the patients colonoscopy report. The report showed a normal colonoscopy with diverticulosis and internal hemorrhoids.  No polyps removed or specimens collected.  Repeat colonoscopy in 5 years due to family history of colon cancer. Please place in recall.

## 2023-01-19 RX ORDER — TIOTROPIUM BROMIDE INHALATION SPRAY 3.12 UG/1
2 SPRAY, METERED RESPIRATORY (INHALATION)
Qty: 2 EACH | Refills: 0 | COMMUNITY
Start: 2023-01-19 | End: 2023-03-16

## 2023-02-21 ENCOUNTER — TELEPHONE (OUTPATIENT)
Dept: CARDIOLOGY | Facility: CLINIC | Age: 78
End: 2023-02-21
Payer: MEDICARE

## 2023-02-22 ENCOUNTER — TELEPHONE (OUTPATIENT)
Dept: CARDIOLOGY | Facility: CLINIC | Age: 78
End: 2023-02-22
Payer: MEDICARE

## 2023-02-28 RX ORDER — TIOTROPIUM BROMIDE INHALATION SPRAY 3.12 UG/1
2 SPRAY, METERED RESPIRATORY (INHALATION)
Qty: 2 EACH | Refills: 0 | COMMUNITY
Start: 2023-02-28 | End: 2023-03-31

## 2023-03-16 ENCOUNTER — OFFICE VISIT (OUTPATIENT)
Dept: CARDIOLOGY | Facility: CLINIC | Age: 78
End: 2023-03-16
Payer: MEDICARE

## 2023-03-16 VITALS
BODY MASS INDEX: 33.13 KG/M2 | HEIGHT: 70 IN | SYSTOLIC BLOOD PRESSURE: 136 MMHG | HEART RATE: 64 BPM | WEIGHT: 231.4 LBS | DIASTOLIC BLOOD PRESSURE: 69 MMHG

## 2023-03-16 DIAGNOSIS — I10 ESSENTIAL HYPERTENSION: ICD-10-CM

## 2023-03-16 DIAGNOSIS — I25.10 CAD S/P PERCUTANEOUS CORONARY ANGIOPLASTY: Primary | ICD-10-CM

## 2023-03-16 DIAGNOSIS — Z98.61 CAD S/P PERCUTANEOUS CORONARY ANGIOPLASTY: Primary | ICD-10-CM

## 2023-03-16 DIAGNOSIS — I35.0 AORTIC STENOSIS, MILD: ICD-10-CM

## 2023-03-16 DIAGNOSIS — E78.5 HYPERLIPIDEMIA LDL GOAL <70: ICD-10-CM

## 2023-03-16 DIAGNOSIS — I71.010 DISSECTION OF ASCENDING AORTA: ICD-10-CM

## 2023-03-16 DIAGNOSIS — J44.9 CHRONIC OBSTRUCTIVE PULMONARY DISEASE, UNSPECIFIED COPD TYPE: ICD-10-CM

## 2023-03-16 PROCEDURE — 3075F SYST BP GE 130 - 139MM HG: CPT | Performed by: INTERNAL MEDICINE

## 2023-03-16 PROCEDURE — 93000 ELECTROCARDIOGRAM COMPLETE: CPT | Performed by: INTERNAL MEDICINE

## 2023-03-16 PROCEDURE — 99214 OFFICE O/P EST MOD 30 MIN: CPT | Performed by: INTERNAL MEDICINE

## 2023-03-16 PROCEDURE — 3078F DIAST BP <80 MM HG: CPT | Performed by: INTERNAL MEDICINE

## 2023-03-16 RX ORDER — ATORVASTATIN CALCIUM 80 MG/1
40 TABLET, FILM COATED ORAL DAILY
Qty: 90 TABLET
Start: 2023-03-16

## 2023-03-16 RX ORDER — ATORVASTATIN CALCIUM 80 MG/1
TABLET, FILM COATED ORAL
COMMUNITY
Start: 2023-01-22 | End: 2023-03-16 | Stop reason: SDUPTHER

## 2023-03-16 RX ORDER — OLMESARTAN MEDOXOMIL, AMLODIPINE AND HYDROCHLOROTHIAZIDE TABLET 40/10/25 MG 40; 10; 25 MG/1; MG/1; MG/1
TABLET ORAL
Qty: 90 TABLET | Refills: 3 | Status: SHIPPED | OUTPATIENT
Start: 2023-03-16

## 2023-03-16 NOTE — PROGRESS NOTES
Chief Complaint  Follow-up, Coronary Artery Disease, Hypertension, and Hyperlipidemia    Subjective    Patient has been doing well not been experiencing any anginal chest discomfort denies any changes breathing capacity.  He does have chronic stable dyspnea on exertion issues and expiratory wheezing    Past Medical History:   Diagnosis Date   • Anemia    • Arthritis    • CAD S/P percutaneous coronary angioplasty 08/23/2021    1 stent placed in 2011   • Chronic bronchitis    • COPD     INHALERS   • Emphysema lung    • Essential hypertension 08/23/2021   • Heart attack 2011   • Hemorrhoids     RECTAL PROB    • Hernia cerebri    • History of aortic dissection 08/23/2021   • Hyperlipidemia LDL goal <70 08/23/2021   • Melena          Current Outpatient Medications:   •  Accu-Chek Guide test strip, , Disp: , Rfl:   •  Accu-Chek Softclix Lancets lancets, , Disp: , Rfl:   •  aspirin 81 MG EC tablet, aspirin 81 mg oral tablet,delayed release (DR/EC) take 1 tablet (81 mg) by oral route once daily   Active, Disp: , Rfl:   •  atorvastatin (LIPITOR) 80 MG tablet, Take 0.5 tablets by mouth Daily., Disp: 90 tablet, Rfl:   •  ezetimibe (Zetia) 10 MG tablet, Take 1 tablet by mouth Daily., Disp: 90 tablet, Rfl: 1  •  metoprolol succinate XL (TOPROL-XL) 100 MG 24 hr tablet, TAKE ONE TABLET BY MOUTH DAILY, Disp: 90 tablet, Rfl: 3  •  pantoprazole (PROTONIX) 40 MG EC tablet, Take 1 tablet by mouth Daily., Disp: 90 tablet, Rfl: 2  •  sertraline (Zoloft) 50 MG tablet, Take 1 tablet by mouth Daily., Disp: 90 tablet, Rfl: 1  •  albuterol sulfate  (90 Base) MCG/ACT inhaler, INHALE 2 PUFFS EVERY 4 HOURS AS NEEDED FOR SHORTNESS OF BREATH (Patient not taking: Reported on 3/16/2023), Disp: 54 g, Rfl: 11  •  Olmesartan-amLODIPine-HCTZ 40-10-25 MG tablet, 40/10/25 mg once a day, Disp: 90 tablet, Rfl: 3  •  omega-3 acid ethyl esters (LOVAZA) 1 g capsule, Take 2 capsules by mouth 2 (Two) Times a Day for 90 days. 2 AM and 2PM, Disp: 360  "capsule, Rfl: 1  •  tiotropium bromide monohydrate (Spiriva Respimat) 2.5 MCG/ACT aerosol solution inhaler, Inhale 2 puffs Daily for 1 day., Disp: 2 each, Rfl: 0    Medications Discontinued During This Encounter   Medication Reason   • tiotropium bromide monohydrate (Spiriva Respimat) 2.5 MCG/ACT aerosol solution inhaler *Re-Entry   • tiotropium bromide monohydrate (Spiriva Respimat) 2.5 MCG/ACT aerosol solution inhaler *Re-Entry   • tiotropium bromide monohydrate (Spiriva Respimat) 2.5 MCG/ACT aerosol solution inhaler *Re-Entry   • tiotropium bromide monohydrate (Spiriva Respimat) 2.5 MCG/ACT aerosol solution inhaler *Re-Entry   • Budeson-Glycopyrrol-Formoterol (Breztri Aerosphere) 160-9-4.8 MCG/ACT aerosol inhaler *Re-Entry   • Cyanocobalamin ([employer] VITAMIN B12 PO) *Re-Entry   • amLODIPine (NORVASC) 10 MG tablet    • lisinopril (PRINIVIL,ZESTRIL) 40 MG tablet    • atorvastatin (LIPITOR) 80 MG tablet Reorder     No Known Allergies     Social History     Tobacco Use   • Smoking status: Former     Packs/day: 1.00     Years: 53.00     Pack years: 53.00     Types: Cigarettes     Quit date: 11/2019     Years since quitting: 3.3   • Smokeless tobacco: Never   Vaping Use   • Vaping Use: Never used   Substance Use Topics   • Alcohol use: Never   • Drug use: Never       Family History   Problem Relation Age of Onset   • Heart disease Father    • Colon cancer Brother    • Cancer Brother         Objective     /69   Pulse 64   Ht 177.8 cm (70\")   Wt 105 kg (231 lb 6.4 oz)   BMI 33.20 kg/m²       Physical Exam    General Appearance:   · no acute distress  · Alert and oriented x3  HENT:   · lips not cyanotic  · Atraumatic  Neck:  · No jvd   · supple  Respiratory:  · no respiratory distress  · normal breath sounds  · no rales  · Thin x-ray wheezes  Cardiovascular:  · Regular rate and rhythm  · no S3, no S4   · 2/6 systolic mid peaking murmur  · no rub  Extremities  · No cyanosis  · lower extremity edema: none    Skin: "   · warm, dry  · No rashes      Result Review :     No results found for: PROBNP       No results found for: TSH   No results found for: FREET4   No results found for: DDIMERQUANT  Magnesium   Date Value Ref Range Status   11/20/2019 2.25 1.60 - 2.30 mg/dL Final      No results found for: DIGOXIN   Lab Results   Component Value Date    TROPONINT <0.01 11/17/2019             No results found for: POCTROP                   ECG 12 Lead    Date/Time: 3/16/2023 4:05 PM  Performed by: Fernie Kevin MD  Authorized by: Fernie Kevin MD   Comparison: compared with previous ECG   Similar to previous ECG  Rhythm: sinus rhythm  Conduction: right bundle branch block and left anterior fascicular block  Comments: Probable left ventricular hypertrophy  Abnormal Q waves possible LVH                   Diagnoses and all orders for this visit:    1. CAD S/P percutaneous coronary angioplasty (Primary)  Assessment & Plan:  Patient is doing well no ongoing angina continue with chronic aspirin 81 mg daily        2. Essential hypertension  Assessment & Plan:  Blood pressure elevated at home recommend changing to olmesartan/amlodipine/hydrochlorothiazide 40/10/25 mg daily repeat BMP in 2 weeks    Orders:  -     Olmesartan-amLODIPine-HCTZ 40-10-25 MG tablet; 40/10/25 mg once a day  Dispense: 90 tablet; Refill: 3  -     Basic Metabolic Panel; Future    3. Hyperlipidemia LDL goal <70  Assessment & Plan:  Continue with Zetia 10 mg and atorvastatin 80 daily repeat lipids LFTs next visit    Orders:  -     Lipid Panel; Future  -     Hepatic Function Panel; Future  -     atorvastatin (LIPITOR) 80 MG tablet; Take 0.5 tablets by mouth Daily.  Dispense: 90 tablet    4. Chronic obstructive pulmonary disease, unspecified COPD type (HCC)    5. Dissection of ascending aorta    6. Aortic stenosis, mild  Assessment & Plan:  Repeat echocardiogram to assess severity    Orders:  -     Adult Transthoracic Echo Complete W/ Cont if Necessary Per Protocol;  Future    Other orders  -     ECG 12 Lead          Follow Up     Return in about 6 months (around 9/16/2023).          Patient was given instructions and counseling regarding his condition or for health maintenance advice. Please see specific information pulled into the AVS if appropriate.

## 2023-03-16 NOTE — ASSESSMENT & PLAN NOTE
Blood pressure elevated at home recommend changing to olmesartan/amlodipine/hydrochlorothiazide 40/10/25 mg daily repeat BMP in 2 weeks

## 2023-03-20 ENCOUNTER — TELEPHONE (OUTPATIENT)
Dept: CARDIOLOGY | Facility: CLINIC | Age: 78
End: 2023-03-20
Payer: MEDICARE

## 2023-03-20 DIAGNOSIS — E78.5 HYPERLIPIDEMIA LDL GOAL <70: ICD-10-CM

## 2023-03-20 NOTE — TELEPHONE ENCOUNTER
----- Message from MYKEL Sanders sent at 3/20/2023  9:32 AM EDT -----  Notify pt labs are good, continue current meds

## 2023-03-29 ENCOUNTER — TELEPHONE (OUTPATIENT)
Dept: CARDIOLOGY | Facility: CLINIC | Age: 78
End: 2023-03-29
Payer: MEDICARE

## 2023-03-29 DIAGNOSIS — I77.810 AORTIC ROOT DILATION: Primary | ICD-10-CM

## 2023-03-29 NOTE — TELEPHONE ENCOUNTER
----- Message from MYKEL Sanders sent at 3/29/2023 12:34 PM EDT -----  Notify pt echo result:   ·  Left ventricular systolic function is normal. Calculated left ventricular EF = 65%  ·  Left ventricular diastolic function was normal.  ·  Mild aortic valve stenosis is present (murmur). Unchanged from previous echo  ·  Borderline dilation of the aortic root is present, would recommend CT Chest to further evaluate.  Follow up as scheduled

## 2023-03-30 ENCOUNTER — OFFICE VISIT (OUTPATIENT)
Dept: PULMONOLOGY | Facility: CLINIC | Age: 78
End: 2023-03-30
Payer: MEDICARE

## 2023-03-30 VITALS
DIASTOLIC BLOOD PRESSURE: 71 MMHG | BODY MASS INDEX: 32.78 KG/M2 | SYSTOLIC BLOOD PRESSURE: 129 MMHG | TEMPERATURE: 98.2 F | HEART RATE: 61 BPM | OXYGEN SATURATION: 95 % | RESPIRATION RATE: 17 BRPM | WEIGHT: 229 LBS | HEIGHT: 70 IN

## 2023-03-30 DIAGNOSIS — J44.9 CHRONIC OBSTRUCTIVE PULMONARY DISEASE, UNSPECIFIED COPD TYPE: Primary | ICD-10-CM

## 2023-03-30 PROCEDURE — 3078F DIAST BP <80 MM HG: CPT | Performed by: INTERNAL MEDICINE

## 2023-03-30 PROCEDURE — 1160F RVW MEDS BY RX/DR IN RCRD: CPT | Performed by: INTERNAL MEDICINE

## 2023-03-30 PROCEDURE — 3074F SYST BP LT 130 MM HG: CPT | Performed by: INTERNAL MEDICINE

## 2023-03-30 PROCEDURE — 99213 OFFICE O/P EST LOW 20 MIN: CPT | Performed by: INTERNAL MEDICINE

## 2023-03-30 PROCEDURE — 1159F MED LIST DOCD IN RCRD: CPT | Performed by: INTERNAL MEDICINE

## 2023-03-30 RX ORDER — ALBUTEROL SULFATE 90 UG/1
2 AEROSOL, METERED RESPIRATORY (INHALATION) EVERY 4 HOURS PRN
Qty: 54 G | Refills: 11 | Status: SHIPPED | OUTPATIENT
Start: 2023-03-30

## 2023-03-31 NOTE — ASSESSMENT & PLAN NOTE
COPD is controlled  Continue Trelegy  Continue albuterol  Patient is scheduled for low-dose lung cancer screening CT scan    Patient is up-to-date on all pulmonary specific vaccines    Encouraged daily exercise

## 2023-03-31 NOTE — PROGRESS NOTES
"Chief Complaint  COPD, Chronic respiratory failure with hypoxia  , Follow-up (6 Month ), Shortness of Breath, Cough, and Wheezing    Subjective        Frank Winter presents to [facility]  History of Present Illness  Follow-up for COPD  Still has issues affording medications  No increased cough  No increased sputum production  Feels breathing at his baseline  Objective   Vital Signs:  /71 (BP Location: Left arm, Patient Position: Sitting, Cuff Size: Large Adult)   Pulse 61   Temp 98.2 °F (36.8 °C) (Temporal)   Resp 17   Ht 177.8 cm (70\")   Wt 104 kg (229 lb)   SpO2 95% Comment: room air  BMI 32.86 kg/m²   Estimated body mass index is 32.86 kg/m² as calculated from the following:    Height as of this encounter: 177.8 cm (70\").    Weight as of this encounter: 104 kg (229 lb).             Physical Exam   Vital Signs Reviewed  General WDWN, Alert, NAD.    Chest:  good aeration, clear to auscultation bilaterally, tympanic to percussion bilaterally, no work of breathing noted  CV: RRR, no MGR, .  EXT:  no clubbing, no cyanosis, no edema, no joint tenderness  Neuro:  A&Ox3, CN grossly intact, no focal deficits.  Skin: No rashes or lesions noted    Result Review :                   Assessment and Plan   Diagnoses and all orders for this visit:    1. Chronic obstructive pulmonary disease, unspecified COPD type (HCC) (Primary)  Assessment & Plan:  COPD is controlled  Continue Trelegy  Continue albuterol  Patient is scheduled for low-dose lung cancer screening CT scan    Patient is up-to-date on all pulmonary specific vaccines    Encouraged daily exercise      Other orders  -     Fluticasone-Umeclidin-Vilant (TRELEGY) 100-62.5-25 MCG/ACT inhaler; Inhale 1 puff Daily.  Dispense: 1 each; Refill: 11  -     albuterol sulfate  (90 Base) MCG/ACT inhaler; Inhale 2 puffs Every 4 (Four) Hours As Needed for Wheezing.  Dispense: 54 g; Refill: 11           Follow " Up   Return in about 2 months (around 5/30/2023).  Patient was given instructions and counseling regarding his condition or for health maintenance advice. Please see specific information pulled into the AVS if appropriate.

## 2023-04-10 ENCOUNTER — HOSPITAL ENCOUNTER (OUTPATIENT)
Dept: CT IMAGING | Facility: HOSPITAL | Age: 78
Discharge: HOME | End: 2023-04-10
Admitting: NURSE PRACTITIONER
Payer: MEDICARE

## 2023-04-10 DIAGNOSIS — I77.810 AORTIC ROOT DILATION: ICD-10-CM

## 2023-04-10 PROCEDURE — 71250 CT THORAX DX C-: CPT

## 2023-04-11 ENCOUNTER — TELEPHONE (OUTPATIENT)
Dept: CARDIOLOGY | Facility: CLINIC | Age: 78
End: 2023-04-11
Payer: MEDICARE

## 2023-04-11 DIAGNOSIS — I71.21 ANEURYSM OF ASCENDING AORTA WITHOUT RUPTURE: Primary | ICD-10-CM

## 2023-04-11 NOTE — TELEPHONE ENCOUNTER
----- Message from MYKEL Sanders sent at 4/11/2023 10:56 AM EDT -----  Xiomy, please notify pt CT shows stable appearing thoracic aortic aneurysm with prior descending thoracic aortic endograft repair.  Maximal aortic diameter measures 4.5 cm just above the level of the endograft. Repeat imaging in one year    Also forwarding to Yazan Helton (PCP) regarding: Moderate centrilobular emphysema.  Recommend patient continue low-dose lung cancer screening as long as patient continues to qualify based upon smoking history and risk factors.

## 2023-05-22 ENCOUNTER — OFFICE VISIT (OUTPATIENT)
Dept: FAMILY MEDICINE CLINIC | Facility: CLINIC | Age: 78
End: 2023-05-22
Payer: MEDICARE

## 2023-05-22 VITALS
HEART RATE: 101 BPM | WEIGHT: 223.2 LBS | SYSTOLIC BLOOD PRESSURE: 136 MMHG | BODY MASS INDEX: 31.95 KG/M2 | OXYGEN SATURATION: 98 % | TEMPERATURE: 97.5 F | DIASTOLIC BLOOD PRESSURE: 82 MMHG | HEIGHT: 70 IN

## 2023-05-22 DIAGNOSIS — Z11.59 NEED FOR HEPATITIS C SCREENING TEST: Primary | ICD-10-CM

## 2023-05-22 DIAGNOSIS — I10 ESSENTIAL (PRIMARY) HYPERTENSION: ICD-10-CM

## 2023-05-22 DIAGNOSIS — Z00.00 MEDICARE ANNUAL WELLNESS VISIT, SUBSEQUENT: ICD-10-CM

## 2023-05-22 DIAGNOSIS — K21.9 GASTROESOPHAGEAL REFLUX DISEASE WITHOUT ESOPHAGITIS: ICD-10-CM

## 2023-05-22 DIAGNOSIS — E55.9 VITAMIN D DEFICIENCY: ICD-10-CM

## 2023-05-22 DIAGNOSIS — E78.2 MIXED HYPERLIPIDEMIA: ICD-10-CM

## 2023-05-22 DIAGNOSIS — E11.9 TYPE 2 DIABETES MELLITUS WITHOUT COMPLICATION, WITHOUT LONG-TERM CURRENT USE OF INSULIN: ICD-10-CM

## 2023-05-22 RX ORDER — PANTOPRAZOLE SODIUM 40 MG/1
40 TABLET, DELAYED RELEASE ORAL DAILY
Qty: 90 TABLET | Refills: 2 | Status: SHIPPED | OUTPATIENT
Start: 2023-05-22

## 2023-05-22 RX ORDER — LISINOPRIL 40 MG/1
TABLET ORAL
COMMUNITY
Start: 2023-04-10 | End: 2023-05-22 | Stop reason: ALTCHOICE

## 2023-05-22 NOTE — PROGRESS NOTES
The ABCs of the Annual Wellness Visit  Subsequent Medicare Wellness Visit    Subjective    Frank Winter is a 77 y.o. male who presents for a Subsequent Medicare Wellness Visit.    The following portions of the patient's history were reviewed and   updated as appropriate: past family history, past social history and past surgical history.    Compared to one year ago, the patient feels his physical   health is the same.    Compared to one year ago, the patient feels his mental   health is the same.    Recent Hospitalizations:  He was not admitted to the hospital during the last year.       Current Medical Providers:  Patient Care Team:  Yazan Helton APRN as PCP - General (Nurse Practitioner)    Outpatient Medications Prior to Visit   Medication Sig Dispense Refill   • Accu-Chek Guide test strip      • Accu-Chek Softclix Lancets lancets      • albuterol sulfate  (90 Base) MCG/ACT inhaler Inhale 2 puffs Every 4 (Four) Hours As Needed for Wheezing. 54 g 11   • aspirin 81 MG EC tablet aspirin 81 mg oral tablet,delayed release (DR/EC) take 1 tablet (81 mg) by oral route once daily   Active     • atorvastatin (LIPITOR) 80 MG tablet Take 0.5 tablets by mouth Daily. (Patient taking differently: Take 1 tablet by mouth Daily.) 90 tablet    • ezetimibe (Zetia) 10 MG tablet Take 1 tablet by mouth Daily. 90 tablet 1   • Fluticasone-Umeclidin-Vilant (TRELEGY) 100-62.5-25 MCG/ACT inhaler Inhale 1 puff Daily. 1 each 11   • metoprolol succinate XL (TOPROL-XL) 100 MG 24 hr tablet TAKE ONE TABLET BY MOUTH DAILY 90 tablet 3   • Olmesartan-amLODIPine-HCTZ 40-10-25 MG tablet 40/10/25 mg once a day 90 tablet 3   • sertraline (Zoloft) 50 MG tablet Take 1 tablet by mouth Daily. 90 tablet 1   • pantoprazole (PROTONIX) 40 MG EC tablet Take 1 tablet by mouth Daily. 90 tablet 2   • omega-3 acid ethyl esters (LOVAZA) 1 g capsule Take 2 capsules by mouth 2 (Two) Times a Day for 90 days. 2 AM and 2PM 360 capsule 1   • lisinopril  "(PRINIVIL,ZESTRIL) 40 MG tablet        No facility-administered medications prior to visit.       No opioid medication identified on active medication list. I have reviewed chart for other potential  high risk medication/s and harmful drug interactions in the elderly.          Aspirin is on active medication list. Aspirin use is indicated based on review of current medical condition/s. Pros and cons of this therapy have been discussed today. Benefits of this medication outweigh potential harm.  Patient has been encouraged to continue taking this medication.  .      Patient Active Problem List   Diagnosis   • Chronic respiratory failure with hypoxia    • Chronic obstructive pulmonary disease    • History of aortic dissection   • CAD S/P percutaneous coronary angioplasty   • Essential hypertension   • Hyperlipidemia LDL goal <70   • Aortic dissection, thoracic (HCC)   • RBBB   • Mild cognitive impairment   • Colon cancer screening   • Aortic stenosis, mild     Advance Care Planning   Advance Care Planning     Advance Directive is not on file.  ACP discussion was declined by the patient. Patient does not have an advance directive, information provided.     Objective    Vitals:    05/22/23 1243   BP: 136/82   BP Location: Right arm   Patient Position: Sitting   Cuff Size: Adult   Pulse: 101   Temp: 97.5 °F (36.4 °C)   TempSrc: Temporal   SpO2: 98%   Weight: 101 kg (223 lb 3.2 oz)   Height: 177.8 cm (70\")     Estimated body mass index is 32.03 kg/m² as calculated from the following:    Height as of this encounter: 177.8 cm (70\").    Weight as of this encounter: 101 kg (223 lb 3.2 oz).    BMI is >= 30 and <35. (Class 1 Obesity). The following options were offered after discussion;: nutrition counseling/recommendations      Does the patient have evidence of cognitive impairment? No          HEALTH RISK ASSESSMENT    Smoking Status:  Social History     Tobacco Use   Smoking Status Former   • Packs/day: 1.00   • Years: 53.00 "   • Pack years: 53.00   • Types: Cigarettes   • Quit date: 2019   • Years since quitting: 3.5   Smokeless Tobacco Never     Alcohol Consumption:  Social History     Substance and Sexual Activity   Alcohol Use Never     Fall Risk Screen:    RICCARDO Fall Risk Assessment was completed, and patient is at LOW risk for falls.Assessment completed on:2023    Depression Screenin/22/2023    12:47 PM   PHQ-2/PHQ-9 Depression Screening   Little Interest or Pleasure in Doing Things 0-->not at all   Feeling Down, Depressed or Hopeless 1-->several days   Trouble Falling or Staying Asleep, or Sleeping Too Much 3-->nearly every day   Feeling Tired or Having Little Energy 3-->nearly every day   Poor Appetite or Overeating 0-->not at all   Feeling Bad about Yourself - or that You are a Failure or Have Let Yourself or Your Family Down 0-->not at all   Trouble Concentrating on Things, Such as Reading the Newspaper or Watching Television 0-->not at all   Moving or Speaking So Slowly that Other People Could Have Noticed? Or the Opposite - Being So Fidgety 1-->several days   Thoughts that You Would be Better Off Dead or of Hurting Yourself in Some Way 0-->not at all   PHQ-9: Brief Depression Severity Measure Score 8   If You Checked Off Any Problems, How Difficult Have These Problems Made It For You to Do Your Work, Take Care of Things at Home, or Get Along with Other People? not difficult at all       Health Habits and Functional and Cognitive Screenin/22/2023    12:00 PM   Functional & Cognitive Status   Do you have difficulty preparing food and eating? No   Do you have difficulty bathing yourself, getting dressed or grooming yourself? No   Do you have difficulty using the toilet? No   Do you have difficulty moving around from place to place? No   Do you have trouble with steps or getting out of a bed or a chair? No   Current Diet Well Balanced Diet   Dental Exam Not up to date   Eye Exam Not up to date    Exercise (times per week) 7 times per week   Current Exercises Include Yard Work;Walking   Do you need help using the phone?  No   Are you deaf or do you have serious difficulty hearing?  Yes   Do you need help with transportation? No   Do you need help shopping? No   Do you need help preparing meals?  No   Do you need help with housework?  No   Do you need help with laundry? No   Do you need help taking your medications? No   Do you need help managing money? No   Do you ever drive or ride in a car without wearing a seat belt? No   Have you felt unusual stress, anger or loneliness in the last month? No   Who do you live with? Spouse   If you need help, do you have trouble finding someone available to you? No   Have you been bothered in the last four weeks by sexual problems? No   Do you have difficulty concentrating, remembering or making decisions? Yes       Age-appropriate Screening Schedule:  Refer to the list below for future screening recommendations based on patient's age, sex and/or medical conditions. Orders for these recommended tests are listed in the plan section. The patient has been provided with a written plan.    Health Maintenance   Topic Date Due   • HEPATITIS C SCREENING  Never done   • DIABETIC EYE EXAM  Never done   • ZOSTER VACCINE (1 of 2) 05/22/2024 (Originally 6/6/1996)   • INFLUENZA VACCINE  08/01/2023   • HEMOGLOBIN A1C  09/17/2023   • LIPID PANEL  03/17/2024   • URINE MICROALBUMIN  03/17/2024   • LUNG CANCER SCREENING  04/10/2024   • GASTROSCOPY (EGD)  04/14/2024   • ANNUAL WELLNESS VISIT  05/22/2024   • TDAP/TD VACCINES (2 - Td or Tdap) 10/27/2024   • COLORECTAL CANCER SCREENING  11/28/2027   • COVID-19 Vaccine  Completed   • Pneumococcal Vaccine 65+  Completed                  CMS Preventative Services Quick Reference  Risk Factors Identified During Encounter  None Identified  The above risks/problems have been discussed with the patient.  Pertinent information has been shared with the  "patient in the After Visit Summary.  An After Visit Summary and PPPS were made available to the patient.    Follow Up:   Next Medicare Wellness visit to be scheduled in 1 year.       Additional E&M Note during same encounter follows:  Patient has multiple medical problems which are significant and separately identifiable that require additional work above and beyond the Medicare Wellness Visit.      Chief Complaint  Hypertension (6 month follow up) and Medicare Wellness-subsequent    Subjective        HPI  Frank Winter is also being seen today for 6-month follow-up regarding his diabetes, hypertension hyperlipidemia.  Patient's last A1c was 6.3%.  He does control this with his diet.  He has his labs completed at [facility].  I did explain he is due for these and he will get these completed this week.  He denies any other concerns or complaints at this time.  Does appear that he is out of his pantoprazole.  I did explain that I would refill this for him.  He denies any other concerns or complaints at this time.       Objective   Vital Signs:  /82 (BP Location: Right arm, Patient Position: Sitting, Cuff Size: Adult)   Pulse 101   Temp 97.5 °F (36.4 °C) (Temporal)   Ht 177.8 cm (70\")   Wt 101 kg (223 lb 3.2 oz)   SpO2 98%   BMI 32.03 kg/m²     Physical Exam  Vitals reviewed.   Constitutional:       Appearance: Normal appearance.   Cardiovascular:      Rate and Rhythm: Normal rate and regular rhythm.      Pulses: Normal pulses.      Heart sounds: Normal heart sounds, S1 normal and S2 normal. No murmur heard.  Pulmonary:      Effort: Pulmonary effort is normal. No respiratory distress.      Breath sounds: Examination of the right-middle field reveals decreased breath sounds. Examination of the left-middle field reveals decreased breath sounds. Examination of the right-lower field reveals decreased breath sounds. Examination of the left-lower field reveals decreased breath sounds. Decreased breath sounds " present.   Skin:     General: Skin is warm and dry.   Neurological:      Mental Status: He is alert and oriented to person, place, and time.   Psychiatric:         Attention and Perception: Attention normal.         Mood and Affect: Mood normal.         Behavior: Behavior normal.                         Assessment and Plan   Diagnoses and all orders for this visit:    1. Need for hepatitis C screening test (Primary)  -     Cancel: Hepatitis C Antibody; Future  -     Hepatitis C Antibody; Future    2. Type 2 diabetes mellitus without complication, without long-term current use of insulin  -     Cancel: CBC (No Diff); Future  -     Cancel: Comprehensive Metabolic Panel; Future  -     Cancel: Hemoglobin A1c; Future  -     Cancel: Lipid Panel; Future  -     Cancel: Microalbumin / Creatinine Urine Ratio - Urine, Clean Catch; Future  -     Cancel: TSH; Future  -     Cancel: Vitamin D,25-Hydroxy; Future  -     CBC (No Diff); Future  -     Comprehensive Metabolic Panel; Future  -     Hemoglobin A1c; Future  -     Lipid Panel; Future  -     Microalbumin / Creatinine Urine Ratio - Urine, Clean Catch; Future  -     TSH; Future  -     Vitamin D,25-Hydroxy; Future    3. Mixed hyperlipidemia  -     Cancel: CBC (No Diff); Future  -     Cancel: Comprehensive Metabolic Panel; Future  -     Cancel: Lipid Panel; Future  -     CBC (No Diff); Future  -     Comprehensive Metabolic Panel; Future  -     Lipid Panel; Future    4. Essential (primary) hypertension  -     Cancel: CBC (No Diff); Future  -     Cancel: Comprehensive Metabolic Panel; Future  -     Cancel: Lipid Panel; Future  -     CBC (No Diff); Future  -     Comprehensive Metabolic Panel; Future  -     Lipid Panel; Future    5. Medicare annual wellness visit, subsequent    6. Vitamin D deficiency  -     Cancel: Vitamin D,25-Hydroxy; Future  -     Vitamin D,25-Hydroxy; Future    7. Gastroesophageal reflux disease without esophagitis  -     pantoprazole (PROTONIX) 40 MG EC tablet;  Take 1 tablet by mouth Daily.  Dispense: 90 tablet; Refill: 2             Follow Up   Return in about 6 months (around 11/22/2023) for Recheck.  Patient was given instructions and counseling regarding his condition or for health maintenance advice. Please see specific information pulled into the AVS if appropriate.

## 2023-05-30 ENCOUNTER — OFFICE VISIT (OUTPATIENT)
Dept: PULMONOLOGY | Facility: CLINIC | Age: 78
End: 2023-05-30

## 2023-05-30 VITALS
HEIGHT: 70 IN | TEMPERATURE: 98.4 F | HEART RATE: 72 BPM | SYSTOLIC BLOOD PRESSURE: 131 MMHG | RESPIRATION RATE: 16 BRPM | BODY MASS INDEX: 32.75 KG/M2 | WEIGHT: 228.8 LBS | OXYGEN SATURATION: 96 % | DIASTOLIC BLOOD PRESSURE: 69 MMHG

## 2023-05-30 DIAGNOSIS — F17.211 NICOTINE DEPENDENCE, CIGARETTES, IN REMISSION: ICD-10-CM

## 2023-05-30 DIAGNOSIS — J44.9 CHRONIC OBSTRUCTIVE PULMONARY DISEASE, UNSPECIFIED COPD TYPE: Primary | ICD-10-CM

## 2023-05-30 DIAGNOSIS — J43.2 CENTRILOBULAR EMPHYSEMA: ICD-10-CM

## 2023-05-30 DIAGNOSIS — R06.09 DYSPNEA ON EXERTION: ICD-10-CM

## 2023-05-30 RX ORDER — FLUTICASONE FUROATE, UMECLIDINIUM BROMIDE AND VILANTEROL TRIFENATATE 100; 62.5; 25 UG/1; UG/1; UG/1
1 POWDER RESPIRATORY (INHALATION)
Qty: 2 EACH | Refills: 0 | COMMUNITY
Start: 2023-05-30 | End: 2023-05-31

## 2023-05-30 NOTE — PROGRESS NOTES
Primary Care Provider  Yazan Helton APRN     Referring Provider  No ref. provider found     Chief Complaint  COPD, Shortness of Breath, Wheezing, Cough, and Follow-up (2 month follow up)    Subjective          Frank Winter presents to [facility]  History of Present Illness  Frank Winter is a 77 y.o. male patient of Dr. Tee here for management of COPD, emphysema, shortness of breath and tobacco use of cigarettes in remission.    Patient states he is doing well since his last visit.  He denies using any antibiotics or steroids for his lungs.  He denies any current fevers or chills.  His shortness of breath is mild in severity, worse with exertion and improved with rest.  He continues to use Trelegy as prescribed.  Patient states that he uses his albuterol inhaler scheduled at night and intermittently during the day.  Patient states that he does feel a significant difference since starting the Trelegy inhaler.  Overall, patient is doing quite well but states he does have some issues affording the Trelegy.  We will give him samples today in office.  He is up-to-date with his COVID, flu and pneumonia vaccines.  He is able to perform his ADLs without difficulty.     His history of smoking is   Tobacco Use: Medium Risk   • Smoking Tobacco Use: Former   • Smokeless Tobacco Use: Never   • Passive Exposure: Not on file   .    Review of Systems   Constitutional: Negative for chills, fatigue, fever, unexpected weight gain and unexpected weight loss.   HENT: Negative for congestion (Nasal), hearing loss, mouth sores, nosebleeds, postnasal drip, sore throat and trouble swallowing.    Eyes: Negative for blurred vision and visual disturbance.   Respiratory: Positive for cough and shortness of breath. Negative for apnea and wheezing.         Negative for Hemoptysis     Cardiovascular: Negative for chest pain, palpitations and leg swelling.   Gastrointestinal:  Negative for abdominal pain, constipation, diarrhea, nausea, vomiting and GERD.        Negative for Jaundice  Negative for Bloating  Negative for Melena   Musculoskeletal: Negative for joint swelling and myalgias.        Negative for Joint pain  Negative for Joint stiffness   Skin: Negative for color change.        Negative for cyanosis   Neurological: Negative for syncope, weakness, numbness and headache.      Sleep: Negative for Excessive daytime sleepiness  Negative for morning headaches  Negative for Snoring    Family History   Problem Relation Age of Onset   • Heart disease Father    • Colon cancer Brother    • Cancer Brother         Social History     Socioeconomic History   • Marital status:    Tobacco Use   • Smoking status: Former     Packs/day: 1.00     Years: 53.00     Pack years: 53.00     Types: Cigarettes     Quit date: 11/2019     Years since quitting: 3.5   • Smokeless tobacco: Never   Vaping Use   • Vaping Use: Never used   Substance and Sexual Activity   • Alcohol use: Never   • Drug use: Never   • Sexual activity: Defer        Past Medical History:   Diagnosis Date   • Anemia    • Arthritis    • CAD S/P percutaneous coronary angioplasty 08/23/2021    1 stent placed in 2011   • Chronic bronchitis    • COPD     INHALERS   • Emphysema lung    • Essential hypertension 08/23/2021   • Heart attack 2011   • Hemorrhoids     RECTAL PROB    • Hernia cerebri    • History of aortic dissection 08/23/2021   • Hyperlipidemia LDL goal <70 08/23/2021   • Melena         Immunization History   Administered Date(s) Administered   • COVID-19 (CombineNet) 03/10/2021, 11/02/2021   • COVID-19 (PFIZER) BIVALENT 12+YRS 11/15/2022   • FluLaval/Fluzone >6mos 11/21/2022   • Fluzone High-Dose 65+yrs 11/22/2021   • Influenza, Unspecified 02/28/2021   • Pneumococcal Conjugate 13-Valent (PCV13) 09/30/2016   • Pneumococcal Polysaccharide (PPSV23) 10/09/2012, 11/12/2019   • Tdap 10/27/2014         No Known Allergies        Current Outpatient Medications:   •  Accu-Chek Guide test strip, , Disp: , Rfl:   •  Accu-Chek Softclix Lancets lancets, , Disp: , Rfl:   •  albuterol sulfate  (90 Base) MCG/ACT inhaler, Inhale 2 puffs Every 4 (Four) Hours As Needed for Wheezing., Disp: 54 g, Rfl: 11  •  aspirin 81 MG EC tablet, aspirin 81 mg oral tablet,delayed release (DR/EC) take 1 tablet (81 mg) by oral route once daily   Active, Disp: , Rfl:   •  atorvastatin (LIPITOR) 80 MG tablet, Take 0.5 tablets by mouth Daily. (Patient taking differently: Take 1 tablet by mouth Daily.), Disp: 90 tablet, Rfl:   •  ezetimibe (Zetia) 10 MG tablet, Take 1 tablet by mouth Daily., Disp: 90 tablet, Rfl: 1  •  Fluticasone-Umeclidin-Vilant (TRELEGY) 100-62.5-25 MCG/ACT inhaler, Inhale 1 puff Daily., Disp: 1 each, Rfl: 11  •  metoprolol succinate XL (TOPROL-XL) 100 MG 24 hr tablet, TAKE ONE TABLET BY MOUTH DAILY, Disp: 90 tablet, Rfl: 3  •  Olmesartan-amLODIPine-HCTZ 40-10-25 MG tablet, 40/10/25 mg once a day, Disp: 90 tablet, Rfl: 3  •  pantoprazole (PROTONIX) 40 MG EC tablet, Take 1 tablet by mouth Daily., Disp: 90 tablet, Rfl: 2  •  sertraline (Zoloft) 50 MG tablet, Take 1 tablet by mouth Daily., Disp: 90 tablet, Rfl: 1  •  Fluticasone-Umeclidin-Vilant (Trelegy Ellipta) 100-62.5-25 MCG/ACT inhaler, Inhale 1 puff Daily for 1 day., Disp: 2 each, Rfl: 0  •  omega-3 acid ethyl esters (LOVAZA) 1 g capsule, Take 2 capsules by mouth 2 (Two) Times a Day for 90 days. 2 AM and 2PM, Disp: 360 capsule, Rfl: 1     Objective   Physical Exam  Constitutional:       General: He is not in acute distress.     Appearance: Normal appearance. He is normal weight.   HENT:      Right Ear: Hearing normal.      Left Ear: Hearing normal.      Nose: No nasal tenderness or congestion.      Mouth/Throat:      Mouth: Mucous membranes are moist. No oral lesions.   Eyes:      Extraocular Movements: Extraocular movements intact.      Pupils: Pupils are equal, round, and reactive to  "light.   Neck:      Thyroid: No thyroid mass or thyromegaly.   Cardiovascular:      Rate and Rhythm: Normal rate and regular rhythm.      Pulses: Normal pulses.      Heart sounds: Normal heart sounds. No murmur heard.  Pulmonary:      Effort: Pulmonary effort is normal.      Breath sounds: Decreased breath sounds present. No wheezing, rhonchi or rales.   Abdominal:      General: Bowel sounds are normal. There is no distension.      Palpations: Abdomen is soft.      Tenderness: There is no abdominal tenderness.   Musculoskeletal:      Cervical back: Neck supple.      Right lower leg: No edema.      Left lower leg: No edema.   Lymphadenopathy:      Cervical: No cervical adenopathy.      Upper Body:      Right upper body: No axillary adenopathy.   Skin:     General: Skin is warm and dry.      Findings: No lesion or rash.   Neurological:      General: No focal deficit present.      Mental Status: He is alert and oriented to person, place, and time.   Psychiatric:         Mood and Affect: Affect normal. Mood is not anxious or depressed.         Vital Signs:   /69 (BP Location: Left arm, Patient Position: Sitting, Cuff Size: Adult)   Pulse 72   Temp 98.4 °F (36.9 °C) (Tympanic)   Resp 16   Ht 177.8 cm (70\")   Wt 104 kg (228 lb 12.8 oz)   SpO2 96% Comment: room air  BMI 32.83 kg/m²        Result Review :       Personally reviewed CBC and CMP from 5/25/2023    Data reviewed: Radiologic studies Chest CT 8/16/2022, chest CT 4/10/2023 and Dr. Tee's last office note   Procedures        Assessment and Plan    Diagnoses and all orders for this visit:    1. Chronic obstructive pulmonary disease, unspecified COPD type (Primary)  -     Fluticasone-Umeclidin-Vilant (Trelegy Ellipta) 100-62.5-25 MCG/ACT inhaler; Inhale 1 puff Daily for 1 day.  Dispense: 2 each; Refill: 0    2. Centrilobular emphysema  -     Fluticasone-Umeclidin-Vilant (Trelegy Ellipta) 100-62.5-25 MCG/ACT inhaler; Inhale 1 puff Daily for 1 day.  " Dispense: 2 each; Refill: 0    3. Dyspnea on exertion    4. Nicotine dependence, cigarettes, in remission    5.  Continue Trelegy as prescribed.  Rinse mouth out after each use.  6.  Continue albuterol as needed.  7.  Attend chest CT as ordered by cardiology in 2024.  8.  Follow-up in 5 months, sooner if needed.        Follow Up   Return in about 5 months (around 10/30/2023) for Recheck with Jane.  Patient was given instructions and counseling regarding his condition or for health maintenance advice. Please see specific information pulled into the AVS if appropriate.

## 2023-05-30 NOTE — PATIENT INSTRUCTIONS
COPD and Physical Activity  Chronic obstructive pulmonary disease (COPD) is a long-term, or chronic, condition that affects the lungs. COPD is a general term that can be used to describe many problems that cause inflammation of the lungs and limit airflow. These conditions include chronic bronchitis and emphysema.  The main symptom of COPD is shortness of breath, which makes it harder to do even simple tasks. This can also make it harder to exercise and stay active. Talk with your health care provider about treatments to help you breathe better and actions you can take to prevent breathing problems during physical activity.  What are the benefits of exercising when you have COPD?  Exercising regularly is an important part of a healthy lifestyle. You can still exercise and do physical activities even though you have COPD. Exercise and physical activity improve your shortness of breath by increasing blood flow (circulation). This causes your heart to pump more oxygen through your body. Moderate exercise can:  Improve oxygen use.  Increase your energy level.  Help with shortness of breath.  Strengthen your breathing muscles.  Improve heart health.  Help with sleep.  Improve your self-esteem and feelings of self-worth.  Lower depression, stress, and anxiety.  Exercise can benefit everyone with COPD. The severity of your disease may affect how hard you can exercise, especially at first, but everyone can benefit. Talk with your health care provider about how much exercise is safe for you, and which activities and exercises are safe for you.  What actions can I take to prevent breathing problems during physical activity?  Sign up for a pulmonary rehabilitation program. This type of program may include:  Education about lung diseases.  Exercise classes that teach you how to exercise and be more active while improving your breathing. This usually involves:  Exercise using your lower extremities, such as a stationary  bicycle.  About 30 minutes of exercise, 2 to 5 times per week, for 6 to 12 weeks.  Strength training, such as push-ups or leg lifts.  Nutrition education.  Group classes in which you can talk with others who also have COPD and learn ways to manage stress.  If you use an oxygen tank, you should use it while you exercise. Work with your health care provider to adjust your oxygen for your physical activity. Your resting flow rate is different from your flow rate during physical activity.  How to manage your breathing while exercising  While you are exercising:  Take slow breaths.  Pace yourself, and do nottry to go too fast.  Purse your lips while breathing out. Pursing your lips is similar to a kissing or whistling position.  If doing exercise that uses a quick burst of effort, such as weight lifting:  Breathe in before starting the exercise.  Breathe out during the hardest part of the exercise, such as raising the weights.  Where to find support  You can find support for exercising with COPD from:  Your health care provider.  A pulmonary rehabilitation program.  Your local health department or community health programs.  Support groups, either online or in-person. Your health care provider may be able to recommend support groups.  Where to find more information  You can find more information about exercising with COPD from:  American Lung Association: lung.org  COPD Foundation: copdfoundation.org  Contact a health care provider if:  Your symptoms get worse.  You have nausea.  You have a fever.  You want to start a new exercise program or a new activity.  Get help right away if:  You have chest pain.  You cannot breathe.  These symptoms may represent a serious problem that is an emergency. Do not wait to see if the symptoms will go away. Get medical help right away. Call your local emergency services (911 in the [place] Do not drive yourself to the hospital.  Summary  COPD is a general term that can be used to describe  many different lung problems that cause lung inflammation and limit airflow. This includes chronic bronchitis and emphysema.  Exercise and physical activity improve your shortness of breath by increasing blood flow (circulation). This causes your heart to provide more oxygen to your body.  Contact your health care provider before starting any exercise program or new activity. Ask your health care provider what exercises and activities are safe for you.  This information is not intended to replace advice given to you by your health care provider. Make sure you discuss any questions you have with your health care provider.  Document Revised: 10/26/2021 Document Reviewed: 10/26/2021  [place] Patient Education © 2022 [employer]

## 2023-06-13 ENCOUNTER — TELEPHONE (OUTPATIENT)
Dept: FAMILY MEDICINE CLINIC | Facility: CLINIC | Age: 78
End: 2023-06-13
Payer: MEDICARE

## 2023-06-13 DIAGNOSIS — N28.9 RENAL INSUFFICIENCY: Primary | ICD-10-CM

## 2023-06-13 NOTE — TELEPHONE ENCOUNTER
Caller: KALILIAM    Relationship: Emergency Contact    Best call back number: [phone]     What is the medical concern/diagnosis: RENAL INSUFFICIENCY FROM LAST LABS ON 05.25.2023    What specialty or service is being requested: NEPHROLOGY    What is the provider, practice or medical service name: PRIMARY [facility] PROVIDER CHOICE    Any additional details: CALLER STATES THAT HERSELF AND THE PATIENT WERE IN THE OFFICE TO DISCUSS LAB RESULTS FROM 05.25.2023. CALLER STATES THAT REFERRAL FOR NEPHROLOGY NEEDED TO BE PLACED FOR PATIENT. CALLER STATES THAT THEY HAVE NOT HEARD ANYTHING BACK ABOUT REFERRAL BEING PLACED AS OF 06.13.2023. CALLER WOULD LIKE CALL BACK ABOUT REFERRAL AND WHAT STEPS TO TAKE NEXT.

## 2023-06-15 DIAGNOSIS — J44.9 CHRONIC OBSTRUCTIVE PULMONARY DISEASE, UNSPECIFIED COPD TYPE: Primary | ICD-10-CM

## 2023-06-15 NOTE — TELEPHONE ENCOUNTER
Patient called in and stated he is needing his Trelegy called into [facility], [place] [phone]. He would like a call letting him know that it was done. Thanks

## 2023-08-22 DIAGNOSIS — I10 ESSENTIAL (PRIMARY) HYPERTENSION: ICD-10-CM

## 2023-08-22 DIAGNOSIS — E78.2 MIXED HYPERLIPIDEMIA: ICD-10-CM

## 2023-08-22 RX ORDER — LISINOPRIL 20 MG/1
TABLET ORAL
Qty: 90 TABLET | Refills: 1 | OUTPATIENT
Start: 2023-08-22

## 2023-08-22 RX ORDER — EZETIMIBE 10 MG/1
TABLET ORAL
Qty: 90 TABLET | Refills: 1 | Status: SHIPPED | OUTPATIENT
Start: 2023-08-22

## 2023-08-28 DIAGNOSIS — F41.9 ANXIETY: ICD-10-CM

## 2023-08-28 DIAGNOSIS — K21.9 GASTROESOPHAGEAL REFLUX DISEASE WITHOUT ESOPHAGITIS: ICD-10-CM

## 2023-08-28 RX ORDER — PANTOPRAZOLE SODIUM 40 MG/1
TABLET, DELAYED RELEASE ORAL
Qty: 90 TABLET | Refills: 2 | Status: SHIPPED | OUTPATIENT
Start: 2023-08-28

## 2023-09-15 RX ORDER — METOPROLOL SUCCINATE 100 MG/1
100 TABLET, EXTENDED RELEASE ORAL DAILY
Qty: 90 TABLET | Refills: 1 | Status: SHIPPED | OUTPATIENT
Start: 2023-09-15

## 2023-09-15 NOTE — TELEPHONE ENCOUNTER
Caller: ZAIN    Relationship: SELF    Best call back number: [phone]    Requested Prescriptions:   Requested Prescriptions     Pending Prescriptions Disp Refills    metoprolol succinate XL (TOPROL-XL) 100 MG 24 hr tablet 90 tablet 3     Sig: Take 1 tablet by mouth Daily.        Pharmacy where request should be sent: [facility] [id number]  [place], [place] - [address] AT [facility] [address] - [phone] [facility] [phone] FX     Last office visit with prescribing clinician: 3/16/2023   Last telemedicine visit with prescribing clinician: Visit date not found   Next office visit with prescribing clinician: 10/16/2023     Additional details provided by patient: PATIENT IS COMPLETELY OUT OF MEDICINE.  90 DAY SUPPLY PLEASE. THANK YOU!    Does the patient have less than a 3 day supply:  [x] Yes  [] No    Would you like a call back once the refill request has been completed: [] Yes [] No    If the office needs to give you a call back, can they leave a voicemail: [] Yes [] No    Reyna Guaman Rep   09/15/23 10:47 EDT

## 2023-10-05 ENCOUNTER — TELEPHONE (OUTPATIENT)
Dept: GASTROENTEROLOGY | Facility: CLINIC | Age: 78
End: 2023-10-05
Payer: MEDICARE

## 2023-10-05 NOTE — TELEPHONE ENCOUNTER
Hub staff attempted to follow warm transfer process and was unsuccessful     Caller: ZAIN BASS    Relationship to patient: SELF    Best call back number: [phone]     Patient is needing: MR. BASS RECEIVED HIS RECALL LETTER TO SCHEDULE HIS 3 YEAR EGD. PLEASE REVIEW AND ADVISE.    OK TO CALL BACK ANYTIME. OK TO LEAVE .

## 2023-10-06 NOTE — TELEPHONE ENCOUNTER
Called patient to schedule for a 3 yr EGD telephone visit. No answer, LVM letting patient know I have scheduled him for this telephone visit and I will mail out an appointment reminder for his telephone visit.

## 2023-12-18 ENCOUNTER — OFFICE VISIT (OUTPATIENT)
Dept: CARDIOLOGY | Facility: CLINIC | Age: 78
End: 2023-12-18
Payer: MEDICARE

## 2023-12-18 VITALS
BODY MASS INDEX: 34.5 KG/M2 | HEIGHT: 70 IN | SYSTOLIC BLOOD PRESSURE: 136 MMHG | DIASTOLIC BLOOD PRESSURE: 67 MMHG | WEIGHT: 241 LBS | HEART RATE: 62 BPM

## 2023-12-18 DIAGNOSIS — Z98.61 CAD S/P PERCUTANEOUS CORONARY ANGIOPLASTY: Primary | ICD-10-CM

## 2023-12-18 DIAGNOSIS — I25.10 CAD S/P PERCUTANEOUS CORONARY ANGIOPLASTY: Primary | ICD-10-CM

## 2023-12-18 DIAGNOSIS — I35.0 AORTIC STENOSIS, MILD: ICD-10-CM

## 2023-12-18 DIAGNOSIS — I10 ESSENTIAL HYPERTENSION: ICD-10-CM

## 2023-12-18 DIAGNOSIS — E78.5 HYPERLIPIDEMIA LDL GOAL <70: ICD-10-CM

## 2023-12-18 DIAGNOSIS — I71.010 DISSECTION OF ASCENDING AORTA: ICD-10-CM

## 2023-12-18 PROBLEM — Z86.79 HISTORY OF AORTIC DISSECTION: Status: RESOLVED | Noted: 2021-08-23 | Resolved: 2023-12-18

## 2023-12-18 PROBLEM — D64.9 ANEMIA: Status: ACTIVE | Noted: 2023-12-18

## 2023-12-18 PROBLEM — E04.1 THYROID NODULE: Status: ACTIVE | Noted: 2023-12-18

## 2023-12-18 PROBLEM — B96.89 DISORDER ASSOCIATED WITH HELICOBACTER SPECIES: Status: RESOLVED | Noted: 2023-12-18 | Resolved: 2023-12-18

## 2023-12-18 PROBLEM — C18.9 COLON CANCER: Status: ACTIVE | Noted: 2023-12-18

## 2023-12-18 PROBLEM — M16.9 OSTEOARTHRITIS OF HIP: Status: ACTIVE | Noted: 2018-04-18

## 2023-12-18 PROBLEM — N18.31 STAGE 3A CHRONIC KIDNEY DISEASE: Status: ACTIVE | Noted: 2023-06-19

## 2023-12-18 RX ORDER — EZETIMIBE 10 MG/1
10 TABLET ORAL DAILY
Qty: 90 TABLET | Refills: 1 | Status: SHIPPED | OUTPATIENT
Start: 2023-12-18

## 2023-12-18 RX ORDER — OMEGA-3-ACID ETHYL ESTERS 1 G/1
2 CAPSULE, LIQUID FILLED ORAL 2 TIMES DAILY
Qty: 360 CAPSULE | Refills: 1 | Status: SHIPPED | OUTPATIENT
Start: 2023-12-18

## 2023-12-18 NOTE — PROGRESS NOTES
Chief Complaint  Follow-up and Coronary Artery Disease    Subjective            History of Present Illness  Frank Winter is a 78-year-old male patient who presents to the office today for follow-up.  He has CAD with prior stenting, hypertension, hyperlipidemia, mild aortic stenosis, and is status post repair of ascending aortic dissection.  He is compliant with medications.  He denies any chest pain, shortness of breath, lightheadedness/dizziness, palpitations, or edema.    PMH  Past Medical History:   Diagnosis Date    Anemia     Arthritis     CAD S/P percutaneous coronary angioplasty 2021    1 stent placed in     Chronic bronchitis     COPD     INHALERS    Emphysema lung     Essential hypertension 2021    Heart attack     Hemorrhoids     RECTAL PROB     Hernia cerebri     History of aortic dissection 2021    Hyperlipidemia LDL goal <70 2021    Melena          ALLERGY  No Known Allergies       SURGICALHX  Past Surgical History:   Procedure Laterality Date    COLONOSCOPY      Dr. Griffin    COLONOSCOPY N/A 2022    Procedure: COLONOSCOPY;  Surgeon: Amado Griffin MD;  Location: [facility] ENDOSCOPY;  Service: Gastroenterology;  Laterality: N/A;  DIVERTICULOSIS, HEMORRHOIDS    HERNIA REPAIR           ORTHOPEDIC SURGERY      LTH          SOC  Social History     Socioeconomic History    Marital status:    Tobacco Use    Smoking status: Former     Packs/day: 1.00     Years: 53.00     Additional pack years: 0.00     Total pack years: 53.00     Types: Cigarettes     Quit date: 2019     Years since quittin.1    Smokeless tobacco: Never   Vaping Use    Vaping Use: Never used   Substance and Sexual Activity    Alcohol use: Never    Drug use: Never    Sexual activity: Defer         FAMHX  Family History   Problem Relation Age of Onset    Heart disease Father     Colon cancer Brother     Cancer Brother           SUKI  Current Outpatient Medications on  "File Prior to Visit   Medication Sig    Accu-Chek Guide test strip     Accu-Chek Softclix Lancets lancets     albuterol sulfate  (90 Base) MCG/ACT inhaler Inhale 2 puffs Every 4 (Four) Hours As Needed for Wheezing.    aspirin 81 MG EC tablet aspirin 81 mg oral tablet,delayed release (DR/EC) take 1 tablet (81 mg) by oral route once daily   Active    atorvastatin (LIPITOR) 80 MG tablet TAKE 1 TABLET EVERY NIGHT    cyanocobalamin (VITAMIN B-12) 500 MCG tablet Take 1 tablet by mouth.    Fluticasone-Umeclidin-Vilant (TRELEGY) 100-62.5-25 MCG/ACT inhaler Inhale 1 puff Daily.    metoprolol succinate XL (TOPROL-XL) 100 MG 24 hr tablet Take 1 tablet by mouth Daily.    Olmesartan-amLODIPine-HCTZ 40-10-25 MG tablet 40/10/25 mg once a day    pantoprazole (PROTONIX) 40 MG EC tablet TAKE 1 TABLET EVERY DAY    sertraline (ZOLOFT) 50 MG tablet TAKE 1 TABLET EVERY DAY    [DISCONTINUED] ezetimibe (ZETIA) 10 MG tablet TAKE 1 TABLET EVERY DAY    omega-3 acid ethyl esters (LOVAZA) 1 g capsule Take 2 capsules by mouth 2 (Two) Times a Day for 90 days. 2 AM and 2PM     No current facility-administered medications on file prior to visit.         Objective   /67   Pulse 62   Ht 177.8 cm (70\")   Wt 109 kg (241 lb)   BMI 34.58 kg/m²       Physical Exam  HENT:      Head: Normocephalic.   Neck:      Vascular: No carotid bruit.   Cardiovascular:      Rate and Rhythm: Normal rate and regular rhythm.      Pulses: Normal pulses.      Heart sounds: Normal heart sounds. No murmur heard.  Pulmonary:      Effort: Pulmonary effort is normal.      Breath sounds: Normal breath sounds.   Musculoskeletal:      Cervical back: Neck supple.      Right lower leg: No edema.      Left lower leg: No edema.   Skin:     General: Skin is dry.   Neurological:      Mental Status: He is alert and oriented to person, place, and time.   Psychiatric:         Behavior: Behavior normal.       Result Review :   The following data was reviewed by: Faviola OLIVER" "MYKEL Dockery on 12/18/2023:  No results found for: \"PROBNP\"     No results found for: \"TSH\"   No results found for: \"FREET4\"   No results found for: \"DDIMERQUANT\"  Magnesium   Date Value Ref Range Status   11/20/2019 2.25 1.60 - 2.30 mg/dL Final      No results found for: \"DIGOXIN\"   Lab Results   Component Value Date    TROPONINT <0.01 11/17/2019           Results for orders placed in visit on 03/27/23    Adult Transthoracic Echo Complete W/ Cont if Necessary Per Protocol    Interpretation Summary    Left ventricular systolic function is normal. Calculated left ventricular EF = 65%    Left ventricular wall thickness is consistent with mild concentric hypertrophy.    Left ventricular diastolic function was normal.    Mild aortic valve stenosis is present.    Borderline dilation of the aortic root is present.         Assessment and Plan    Diagnoses and all orders for this visit:    1. CAD S/P percutaneous coronary angioplasty (Primary)  He denies any anginal symptoms, continue aspirin 81 mg daily.    2. Essential hypertension  Currently controlled and without adverse effects from medication, continue metoprolol 100 mg daily and olmesartan-amlodipine-HCTZ 40 - 10 - 25 mg daily.    3. Hyperlipidemia LDL goal <70  Last lipid panel was 5/25/2023 with LDL 60 which is within goal range, continue atorvastatin 80 mg daily and ezetimibe 10 mg daily.  -     ezetimibe (ZETIA) 10 MG tablet; Take 1 tablet by mouth Daily.  Dispense: 90 tablet; Refill: 1    4. Aortic stenosis, mild & 5. Dissection of ascending aorta s/p repair  Asymptomatic, continue to monitor with repeat echocardiogram.            Follow Up   Return in about 6 months (around 6/18/2024) for Follow up with Dr Kevin.    Patient was given instructions and counseling regarding his condition or for health maintenance advice. Please see specific information pulled into the AVS if appropriate.     Frank Winter  reports that he quit smoking about 4 years ago. His " smoking use included cigarettes. He has a 53.00 pack-year smoking history. He has never used smokeless tobacco.           Faviola Dockery, MYKEL  12/18/23  09:02 EST    Dictated Utilizing Dragon Dictation

## 2024-01-11 ENCOUNTER — LAB (OUTPATIENT)
Dept: LAB | Facility: HOSPITAL | Age: 79
End: 2024-01-11
Payer: MEDICARE

## 2024-01-11 ENCOUNTER — TRANSCRIBE ORDERS (OUTPATIENT)
Dept: LAB | Facility: HOSPITAL | Age: 79
End: 2024-01-11
Payer: MEDICARE

## 2024-01-11 DIAGNOSIS — E11.65 TYPE II DIABETES MELLITUS WITH HYPEROSMOLARITY, UNCONTROLLED: ICD-10-CM

## 2024-01-11 DIAGNOSIS — E11.00 TYPE II DIABETES MELLITUS WITH HYPEROSMOLARITY, UNCONTROLLED: ICD-10-CM

## 2024-01-11 DIAGNOSIS — E78.2 MIXED HYPERLIPIDEMIA: Primary | ICD-10-CM

## 2024-01-11 DIAGNOSIS — E78.2 MIXED HYPERLIPIDEMIA: ICD-10-CM

## 2024-01-11 DIAGNOSIS — D64.9 ANEMIA, UNSPECIFIED TYPE: ICD-10-CM

## 2024-01-11 DIAGNOSIS — I10 ESSENTIAL HYPERTENSION, MALIGNANT: ICD-10-CM

## 2024-01-11 DIAGNOSIS — E03.9 ACQUIRED HYPOTHYROIDISM: ICD-10-CM

## 2024-01-11 LAB
ALBUMIN SERPL-MCNC: 4.6 G/DL (ref 3.5–5.2)
ALBUMIN UR-MCNC: <1.2 MG/DL
ALBUMIN/GLOB SERPL: 1.9 G/DL
ALP SERPL-CCNC: 91 U/L (ref 39–117)
ALT SERPL W P-5'-P-CCNC: 20 U/L (ref 1–41)
ANION GAP SERPL CALCULATED.3IONS-SCNC: 10 MMOL/L (ref 5–15)
AST SERPL-CCNC: 17 U/L (ref 1–40)
BASOPHILS # BLD AUTO: 0.03 10*3/MM3 (ref 0–0.2)
BASOPHILS NFR BLD AUTO: 0.6 % (ref 0–1.5)
BILIRUB SERPL-MCNC: 0.4 MG/DL (ref 0–1.2)
BUN SERPL-MCNC: 23 MG/DL (ref 8–23)
BUN/CREAT SERPL: 17 (ref 7–25)
CALCIUM SPEC-SCNC: 9.3 MG/DL (ref 8.6–10.5)
CHLORIDE SERPL-SCNC: 104 MMOL/L (ref 98–107)
CHOLEST SERPL-MCNC: 115 MG/DL (ref 0–200)
CO2 SERPL-SCNC: 24 MMOL/L (ref 22–29)
CREAT SERPL-MCNC: 1.35 MG/DL (ref 0.76–1.27)
CREAT UR-MCNC: 117.3 MG/DL
DEPRECATED RDW RBC AUTO: 43.1 FL (ref 37–54)
EGFRCR SERPLBLD CKD-EPI 2021: 54.1 ML/MIN/1.73
EOSINOPHIL # BLD AUTO: 0.3 10*3/MM3 (ref 0–0.4)
EOSINOPHIL NFR BLD AUTO: 5.5 % (ref 0.3–6.2)
ERYTHROCYTE [DISTWIDTH] IN BLOOD BY AUTOMATED COUNT: 13 % (ref 12.3–15.4)
FERRITIN SERPL-MCNC: 448 NG/ML (ref 30–400)
FOLATE SERPL-MCNC: 10.9 NG/ML (ref 4.78–24.2)
GLOBULIN UR ELPH-MCNC: 2.4 GM/DL
GLUCOSE SERPL-MCNC: 162 MG/DL (ref 65–99)
HBA1C MFR BLD: 7.2 % (ref 4.8–5.6)
HCT VFR BLD AUTO: 37.5 % (ref 37.5–51)
HDLC SERPL-MCNC: 38 MG/DL (ref 40–60)
HGB BLD-MCNC: 12.4 G/DL (ref 13–17.7)
IMM GRANULOCYTES # BLD AUTO: 0.03 10*3/MM3 (ref 0–0.05)
IMM GRANULOCYTES NFR BLD AUTO: 0.6 % (ref 0–0.5)
IRON 24H UR-MRATE: 82 MCG/DL (ref 59–158)
IRON SATN MFR SERPL: 25 % (ref 20–50)
LDLC SERPL CALC-MCNC: 55 MG/DL (ref 0–100)
LDLC/HDLC SERPL: 1.38 {RATIO}
LYMPHOCYTES # BLD AUTO: 1.53 10*3/MM3 (ref 0.7–3.1)
LYMPHOCYTES NFR BLD AUTO: 28.2 % (ref 19.6–45.3)
MCH RBC QN AUTO: 30 PG (ref 26.6–33)
MCHC RBC AUTO-ENTMCNC: 33.1 G/DL (ref 31.5–35.7)
MCV RBC AUTO: 90.8 FL (ref 79–97)
MONOCYTES # BLD AUTO: 0.49 10*3/MM3 (ref 0.1–0.9)
MONOCYTES NFR BLD AUTO: 9 % (ref 5–12)
NEUTROPHILS NFR BLD AUTO: 3.05 10*3/MM3 (ref 1.7–7)
NEUTROPHILS NFR BLD AUTO: 56.1 % (ref 42.7–76)
NRBC BLD AUTO-RTO: 0.2 /100 WBC (ref 0–0.2)
PLATELET # BLD AUTO: 172 10*3/MM3 (ref 140–450)
PMV BLD AUTO: 10.2 FL (ref 6–12)
POTASSIUM SERPL-SCNC: 4.1 MMOL/L (ref 3.5–5.2)
PROT SERPL-MCNC: 7 G/DL (ref 6–8.5)
RBC # BLD AUTO: 4.13 10*6/MM3 (ref 4.14–5.8)
RETICS # AUTO: 0.11 10*6/MM3 (ref 0.02–0.13)
RETICS/RBC NFR AUTO: 2.63 % (ref 0.7–1.9)
SODIUM SERPL-SCNC: 138 MMOL/L (ref 136–145)
T3FREE SERPL-MCNC: 3.29 PG/ML (ref 2–4.4)
T4 FREE SERPL-MCNC: 1.21 NG/DL (ref 0.93–1.7)
TIBC SERPL-MCNC: 328 MCG/DL (ref 298–536)
TRANSFERRIN SERPL-MCNC: 220 MG/DL (ref 200–360)
TRIGL SERPL-MCNC: 122 MG/DL (ref 0–150)
TSH SERPL DL<=0.05 MIU/L-ACNC: 2.85 UIU/ML (ref 0.27–4.2)
VIT B12 BLD-MCNC: 665 PG/ML (ref 211–946)
VLDLC SERPL-MCNC: 22 MG/DL (ref 5–40)
WBC NRBC COR # BLD AUTO: 5.43 10*3/MM3 (ref 3.4–10.8)

## 2024-01-11 PROCEDURE — 82570 ASSAY OF URINE CREATININE: CPT

## 2024-01-11 PROCEDURE — 85045 AUTOMATED RETICULOCYTE COUNT: CPT

## 2024-01-11 PROCEDURE — 84439 ASSAY OF FREE THYROXINE: CPT

## 2024-01-11 PROCEDURE — 82728 ASSAY OF FERRITIN: CPT

## 2024-01-11 PROCEDURE — 80053 COMPREHEN METABOLIC PANEL: CPT

## 2024-01-11 PROCEDURE — 82043 UR ALBUMIN QUANTITATIVE: CPT

## 2024-01-11 PROCEDURE — 36415 COLL VENOUS BLD VENIPUNCTURE: CPT

## 2024-01-11 PROCEDURE — 80061 LIPID PANEL: CPT

## 2024-01-11 PROCEDURE — 83036 HEMOGLOBIN GLYCOSYLATED A1C: CPT

## 2024-01-11 PROCEDURE — 82607 VITAMIN B-12: CPT

## 2024-01-11 PROCEDURE — 82746 ASSAY OF FOLIC ACID SERUM: CPT

## 2024-01-11 PROCEDURE — 84481 FREE ASSAY (FT-3): CPT

## 2024-01-11 PROCEDURE — 85025 COMPLETE CBC W/AUTO DIFF WBC: CPT

## 2024-01-11 PROCEDURE — 84466 ASSAY OF TRANSFERRIN: CPT

## 2024-01-11 PROCEDURE — 83540 ASSAY OF IRON: CPT

## 2024-01-11 PROCEDURE — 84443 ASSAY THYROID STIM HORMONE: CPT

## 2024-01-12 ENCOUNTER — TELEPHONE (OUTPATIENT)
Dept: CARDIOLOGY | Facility: CLINIC | Age: 79
End: 2024-01-12
Payer: MEDICARE

## 2024-01-12 NOTE — TELEPHONE ENCOUNTER
----- Message from MYKEL Sanders sent at 1/12/2024  7:44 AM EST -----  Please forward A1c, ferritin, and reticulocyte level to PCP  Renal function is stable, electrolytes are improved, liver enzymes are good, lipid panel is good, thyroid levels are good, continue current meds

## 2024-02-15 ENCOUNTER — PREP FOR SURGERY (OUTPATIENT)
Dept: OTHER | Facility: HOSPITAL | Age: 79
End: 2024-02-15
Payer: MEDICARE

## 2024-02-15 ENCOUNTER — CLINICAL SUPPORT (OUTPATIENT)
Dept: GASTROENTEROLOGY | Facility: CLINIC | Age: 79
End: 2024-02-15
Payer: MEDICARE

## 2024-02-15 DIAGNOSIS — Z87.19 HISTORY OF BARRETT'S ESOPHAGUS: ICD-10-CM

## 2024-02-15 DIAGNOSIS — K21.9 GASTROESOPHAGEAL REFLUX DISEASE, UNSPECIFIED WHETHER ESOPHAGITIS PRESENT: Primary | ICD-10-CM

## 2024-02-15 RX ORDER — GLIPIZIDE 2.5 MG/1
TABLET, EXTENDED RELEASE ORAL
COMMUNITY
Start: 2024-01-23

## 2024-02-27 ENCOUNTER — TELEPHONE (OUTPATIENT)
Dept: GASTROENTEROLOGY | Facility: CLINIC | Age: 79
End: 2024-02-27
Payer: MEDICARE

## 2024-02-27 NOTE — TELEPHONE ENCOUNTER
2024    Dear Dr. Kevin,      Patient Name: Frank Winter  : 1945      This patient is waiting to have a Colonoscopy and/or Esophagogastroduodenoscopy which I will perform at [facility] on 2024. Please respond to this request noting your recommendations regarding clearance from a Cardiac  standpoint.  You may contact our office at [phone] Option 1 with any questions. I appreciate your prompt response in this matter. Please return this form to our office as soon as possible to [phone].    ____ I approve my patient from a Cardiac  standpoint    ____ I do NOT approve my patient from a Cardiac  standpoint at this time      Please specify clearance expiration date:____________________________________      Approving physician name (please print): _____________________________________________      Approving physician signature: ________________________________ Date:________________  Sincerely,  [facility] - Gastroenterology   Dr. Amado Griffin          Please fax approval or denial to our office as soon as possible.

## 2024-02-27 NOTE — TELEPHONE ENCOUNTER
Procedure: Colonoscopy and/or EGD     Med Directive: NA     PMH: CAD prior stent, HTN, HLD, ascending aorta repair      Last Seen: 12/18/23

## 2024-02-27 NOTE — TELEPHONE ENCOUNTER
2024    Dear Dr. Tee,      Patient Name: Frank Winter  : 1945      This patient is waiting to have a Colonoscopy and/or Esophagogastroduodenoscopy which I will perform at [facility] on 24. Please respond to this request noting your recommendations regarding clearance from a Pulmonary  standpoint.  You may contact our office at [phone] Option 1 with any questions. I appreciate your prompt response in this matter. Please return this form to our office as soon as possible to [phone].    ____ I approve my patient from a Pulmonary  standpoint    ____ I do NOT approve my patient from a Pulmonary  standpoint at this time      Please specify clearance expiration date:____________________________________      Approving physician name (please print): _____________________________________________      Approving physician signature: ________________________________ Date:________________  Sincerely,  [facility] - Gastroenterology   Dr. Amado Griffin          Please fax approval or denial to our office as soon as possible.

## 2024-03-08 ENCOUNTER — LAB (OUTPATIENT)
Dept: LAB | Facility: HOSPITAL | Age: 79
End: 2024-03-08
Payer: MEDICARE

## 2024-03-08 ENCOUNTER — TRANSCRIBE ORDERS (OUTPATIENT)
Dept: LAB | Facility: HOSPITAL | Age: 79
End: 2024-03-08
Payer: MEDICARE

## 2024-03-08 DIAGNOSIS — E55.9 VITAMIN D DEFICIENCY: ICD-10-CM

## 2024-03-08 DIAGNOSIS — I10 HYPERTENSION, UNSPECIFIED TYPE: ICD-10-CM

## 2024-03-08 DIAGNOSIS — N18.30 STAGE 3 CHRONIC KIDNEY DISEASE, UNSPECIFIED WHETHER STAGE 3A OR 3B CKD: ICD-10-CM

## 2024-03-08 DIAGNOSIS — N18.30 STAGE 3 CHRONIC KIDNEY DISEASE, UNSPECIFIED WHETHER STAGE 3A OR 3B CKD: Primary | ICD-10-CM

## 2024-03-08 DIAGNOSIS — I10 ESSENTIAL HYPERTENSION: ICD-10-CM

## 2024-03-08 DIAGNOSIS — E78.5 HYPERLIPIDEMIA LDL GOAL <70: ICD-10-CM

## 2024-03-08 LAB
25(OH)D3 SERPL-MCNC: 33.1 NG/ML (ref 30–100)
ALBUMIN SERPL-MCNC: 4.4 G/DL (ref 3.5–5.2)
ALBUMIN UR-MCNC: <1.2 MG/DL
ALBUMIN/GLOB SERPL: 1.8 G/DL
ALP SERPL-CCNC: 77 U/L (ref 39–117)
ALT SERPL W P-5'-P-CCNC: 18 U/L (ref 1–41)
ANION GAP SERPL CALCULATED.3IONS-SCNC: 10.5 MMOL/L (ref 5–15)
AST SERPL-CCNC: 21 U/L (ref 1–40)
BASOPHILS # BLD AUTO: 0.03 10*3/MM3 (ref 0–0.2)
BASOPHILS NFR BLD AUTO: 0.5 % (ref 0–1.5)
BILIRUB CONJ SERPL-MCNC: <0.2 MG/DL (ref 0–0.3)
BILIRUB SERPL-MCNC: 0.4 MG/DL (ref 0–1.2)
BILIRUB UR QL STRIP: NEGATIVE
BUN SERPL-MCNC: 27 MG/DL (ref 8–23)
BUN/CREAT SERPL: 20.5 (ref 7–25)
CALCIUM SPEC-SCNC: 9 MG/DL (ref 8.6–10.5)
CHLORIDE SERPL-SCNC: 102 MMOL/L (ref 98–107)
CLARITY UR: CLEAR
CO2 SERPL-SCNC: 27.5 MMOL/L (ref 22–29)
COLOR UR: YELLOW
CREAT SERPL-MCNC: 1.32 MG/DL (ref 0.76–1.27)
CREAT UR-MCNC: 112.2 MG/DL
CREAT UR-MCNC: 117 MG/DL
DEPRECATED RDW RBC AUTO: 45.1 FL (ref 37–54)
EGFRCR SERPLBLD CKD-EPI 2021: 55.6 ML/MIN/1.73
EOSINOPHIL # BLD AUTO: 0.25 10*3/MM3 (ref 0–0.4)
EOSINOPHIL NFR BLD AUTO: 4 % (ref 0.3–6.2)
ERYTHROCYTE [DISTWIDTH] IN BLOOD BY AUTOMATED COUNT: 13.3 % (ref 12.3–15.4)
GLOBULIN UR ELPH-MCNC: 2.4 GM/DL
GLUCOSE SERPL-MCNC: 136 MG/DL (ref 65–99)
GLUCOSE UR STRIP-MCNC: NEGATIVE MG/DL
HCT VFR BLD AUTO: 38 % (ref 37.5–51)
HGB BLD-MCNC: 12.8 G/DL (ref 13–17.7)
HGB UR QL STRIP.AUTO: NEGATIVE
IMM GRANULOCYTES # BLD AUTO: 0.04 10*3/MM3 (ref 0–0.05)
IMM GRANULOCYTES NFR BLD AUTO: 0.6 % (ref 0–0.5)
KETONES UR QL STRIP: NEGATIVE
LEUKOCYTE ESTERASE UR QL STRIP.AUTO: NEGATIVE
LYMPHOCYTES # BLD AUTO: 1.7 10*3/MM3 (ref 0.7–3.1)
LYMPHOCYTES NFR BLD AUTO: 27.2 % (ref 19.6–45.3)
MAGNESIUM SERPL-MCNC: 1.7 MG/DL (ref 1.6–2.4)
MCH RBC QN AUTO: 31.1 PG (ref 26.6–33)
MCHC RBC AUTO-ENTMCNC: 33.7 G/DL (ref 31.5–35.7)
MCV RBC AUTO: 92.2 FL (ref 79–97)
MICROALBUMIN/CREAT UR: NORMAL MG/G{CREAT}
MONOCYTES # BLD AUTO: 0.56 10*3/MM3 (ref 0.1–0.9)
MONOCYTES NFR BLD AUTO: 9 % (ref 5–12)
NEUTROPHILS NFR BLD AUTO: 3.67 10*3/MM3 (ref 1.7–7)
NEUTROPHILS NFR BLD AUTO: 58.7 % (ref 42.7–76)
NITRITE UR QL STRIP: NEGATIVE
NRBC BLD AUTO-RTO: 0 /100 WBC (ref 0–0.2)
PH UR STRIP.AUTO: 6.5 [PH] (ref 5–8)
PLATELET # BLD AUTO: 161 10*3/MM3 (ref 140–450)
PMV BLD AUTO: 10 FL (ref 6–12)
POTASSIUM SERPL-SCNC: 4.2 MMOL/L (ref 3.5–5.2)
PROT ?TM UR-MCNC: 7 MG/DL
PROT SERPL-MCNC: 6.8 G/DL (ref 6–8.5)
PROT UR QL STRIP: NEGATIVE
PROT/CREAT UR: 0.06 MG/G{CREAT}
RBC # BLD AUTO: 4.12 10*6/MM3 (ref 4.14–5.8)
SODIUM SERPL-SCNC: 140 MMOL/L (ref 136–145)
SP GR UR STRIP: 1.02 (ref 1–1.03)
UROBILINOGEN UR QL STRIP: NORMAL
WBC NRBC COR # BLD AUTO: 6.25 10*3/MM3 (ref 3.4–10.8)

## 2024-03-08 PROCEDURE — 84156 ASSAY OF PROTEIN URINE: CPT

## 2024-03-08 PROCEDURE — 80053 COMPREHEN METABOLIC PANEL: CPT

## 2024-03-08 PROCEDURE — 82248 BILIRUBIN DIRECT: CPT

## 2024-03-08 PROCEDURE — 81003 URINALYSIS AUTO W/O SCOPE: CPT

## 2024-03-08 PROCEDURE — 36415 COLL VENOUS BLD VENIPUNCTURE: CPT

## 2024-03-08 PROCEDURE — 85025 COMPLETE CBC W/AUTO DIFF WBC: CPT

## 2024-03-08 PROCEDURE — 82043 UR ALBUMIN QUANTITATIVE: CPT

## 2024-03-08 PROCEDURE — 83735 ASSAY OF MAGNESIUM: CPT

## 2024-03-08 PROCEDURE — 82570 ASSAY OF URINE CREATININE: CPT

## 2024-03-08 PROCEDURE — 82306 VITAMIN D 25 HYDROXY: CPT

## 2024-03-11 ENCOUNTER — TELEPHONE (OUTPATIENT)
Dept: CARDIOLOGY | Facility: CLINIC | Age: 79
End: 2024-03-11
Payer: MEDICARE

## 2024-03-11 RX ORDER — METOPROLOL SUCCINATE 100 MG/1
100 TABLET, EXTENDED RELEASE ORAL DAILY
Qty: 90 TABLET | Refills: 1 | Status: SHIPPED | OUTPATIENT
Start: 2024-03-11

## 2024-03-11 NOTE — TELEPHONE ENCOUNTER
----- Message from MYKEL Sanders sent at 3/11/2024  8:06 AM EDT -----  Renal function and electrolytes are stable, continue current meds

## 2024-03-12 ENCOUNTER — OFFICE VISIT (OUTPATIENT)
Dept: PULMONOLOGY | Facility: CLINIC | Age: 79
End: 2024-03-12
Payer: MEDICARE

## 2024-03-12 VITALS
DIASTOLIC BLOOD PRESSURE: 69 MMHG | HEIGHT: 70 IN | HEART RATE: 60 BPM | WEIGHT: 245 LBS | OXYGEN SATURATION: 96 % | TEMPERATURE: 97.8 F | RESPIRATION RATE: 16 BRPM | SYSTOLIC BLOOD PRESSURE: 140 MMHG | BODY MASS INDEX: 35.07 KG/M2

## 2024-03-12 DIAGNOSIS — J44.9 CHRONIC OBSTRUCTIVE PULMONARY DISEASE, UNSPECIFIED COPD TYPE: Primary | ICD-10-CM

## 2024-03-12 DIAGNOSIS — R06.09 DYSPNEA ON EXERTION: ICD-10-CM

## 2024-03-12 DIAGNOSIS — F17.211 NICOTINE DEPENDENCE, CIGARETTES, IN REMISSION: ICD-10-CM

## 2024-03-12 NOTE — PROGRESS NOTES
Primary Care Provider  Farooq Magana MD     Referring Provider  No ref. provider found     Chief Complaint  COPD and Follow-up (Pulmonary Clearance - EGD 4/8/2024 Dr. Griffin )    Subjective          Frank Winter presents to [facility]MON[facility]INE  History of Present Illness  Frank Winter is a 78 y.o. male patient of Dr. Tee here for management of COPD, emphysema, shortness of breath and tobacco use of cigarettes in remission.  .     Patient states he is doing well since his last office visit.  He denies using any antibiotics or steroids for his lungs.  He denies any current fevers or chills.  His shortness of breath is mild in severity, worse with exertion and improved with rest.  He continues to use Trelegy as prescribed.  He will intermittently use albuterol as needed, but states that is not often.  He is scheduled to have a esophagogastroduodenoscopy by Dr. Griffin on 4/8/2024.  Patient will be cleared from a pulmonary standpoint.  Overall, he is doing well and has no additional concerns at this time.  He is able to perform his ADLs without difficulty.  He is up-to-date with his COVID, flu and pneumonia vaccines.     His history of smoking is   Tobacco Use: Medium Risk (3/12/2024)    Patient History     Smoking Tobacco Use: Former     Smokeless Tobacco Use: Never     Passive Exposure: Past   .    Review of Systems   Constitutional:  Negative for chills, fatigue, fever, unexpected weight gain and unexpected weight loss.   HENT:  Congestion: Nasal.    Respiratory:  Positive for shortness of breath. Negative for apnea, cough and wheezing.         Negative for Hemoptysis     Cardiovascular:  Negative for chest pain, palpitations and leg swelling.   Skin:         Negative for cyanosis      Sleep: Negative for Excessive daytime sleepiness  Negative for morning headaches  Negative for Snoring    Family History   Problem Relation Age of Onset    Heart disease  Father     Colon cancer Brother     Cancer Brother         Social History     Socioeconomic History    Marital status:    Tobacco Use    Smoking status: Former     Current packs/day: 0.00     Average packs/day: 1 pack/day for 53.0 years (53.0 ttl pk-yrs)     Types: Cigarettes     Start date: 1966     Quit date: 2019     Years since quittin.3     Passive exposure: Past    Smokeless tobacco: Never   Vaping Use    Vaping status: Never Used   Substance and Sexual Activity    Alcohol use: Never    Drug use: Never    Sexual activity: Defer        Past Medical History:   Diagnosis Date    Anemia     Arthritis     Tobias esophagus     CAD S/P percutaneous coronary angioplasty 2021    1 stent placed in     Chronic bronchitis     COPD     INHALERS    Emphysema lung     Essential hypertension 2021    GERD (gastroesophageal reflux disease)     Heart attack     Hemorrhoids     RECTAL PROB     Hernia cerebri     History of aortic dissection 2021    Hyperlipidemia LDL goal <70 2021    Melena         Immunization History   Administered Date(s) Administered    COVID-19 (True&Co) 03/10/2021, 2021    COVID-19 (PFIZER) BIVALENT 12+YRS 11/15/2022    Fluzone (or Fluarix & Flulaval for VFC) >6mos 2022    Fluzone High Dose =>65 Years ([employer] ONLY) 2023    Fluzone High-Dose 65+yrs 2021    Influenza, Unspecified 2021    Pneumococcal Conjugate 13-Valent (PCV13) 2016    Pneumococcal Polysaccharide (PPSV23) 10/09/2012, 2019    Tdap 10/27/2014         No Known Allergies       Current Outpatient Medications:     Accu-Chek Guide test strip, , Disp: , Rfl:     Accu-Chek Softclix Lancets lancets, , Disp: , Rfl:     albuterol sulfate  (90 Base) MCG/ACT inhaler, Inhale 2 puffs Every 4 (Four) Hours As Needed for Wheezing., Disp: 54 g, Rfl: 11    aspirin 81 MG EC tablet, aspirin 81 mg oral tablet,delayed release (DR/EC) take 1 tablet (81 mg) by oral route  once daily   Active, Disp: , Rfl:     atorvastatin (LIPITOR) 80 MG tablet, TAKE 1 TABLET EVERY NIGHT, Disp: 90 tablet, Rfl: 1    cyanocobalamin (VITAMIN B-12) 500 MCG tablet, Take 1 tablet by mouth., Disp: , Rfl:     ezetimibe (ZETIA) 10 MG tablet, Take 1 tablet by mouth Daily., Disp: 90 tablet, Rfl: 1    Fluticasone-Umeclidin-Vilant (TRELEGY) 100-62.5-25 MCG/ACT inhaler, Inhale 1 puff Daily., Disp: 180 each, Rfl: 3    glipizide (GLUCOTROL XL) 2.5 MG 24 hr tablet, , Disp: , Rfl:     metFORMIN (GLUCOPHAGE) 500 MG tablet, , Disp: , Rfl:     metoprolol succinate XL (TOPROL-XL) 100 MG 24 hr tablet, TAKE 1 TABLET BY MOUTH DAILY, Disp: 90 tablet, Rfl: 1    Olmesartan-amLODIPine-HCTZ 40-10-25 MG tablet, 40/10/25 mg once a day, Disp: 90 tablet, Rfl: 3    omega-3 acid ethyl esters (LOVAZA) 1 g capsule, Take 2 capsules by mouth 2 (Two) Times a Day. 2 AM and 2PM, Disp: 360 capsule, Rfl: 1    pantoprazole (PROTONIX) 40 MG EC tablet, TAKE 1 TABLET EVERY DAY, Disp: 90 tablet, Rfl: 2    sertraline (ZOLOFT) 50 MG tablet, TAKE 1 TABLET EVERY DAY, Disp: 90 tablet, Rfl: 1     Objective   Physical Exam  Constitutional:       General: He is not in acute distress.     Appearance: Normal appearance. He is normal weight.   HENT:      Right Ear: Hearing normal.      Left Ear: Hearing normal.      Nose: No nasal tenderness or congestion.      Mouth/Throat:      Mouth: Mucous membranes are moist. No oral lesions.   Eyes:      Extraocular Movements: Extraocular movements intact.      Pupils: Pupils are equal, round, and reactive to light.   Neck:      Thyroid: No thyroid mass or thyromegaly.   Cardiovascular:      Rate and Rhythm: Normal rate and regular rhythm.      Pulses: Normal pulses.      Heart sounds: Normal heart sounds. No murmur heard.  Pulmonary:      Effort: Pulmonary effort is normal.      Breath sounds: Normal breath sounds. No wheezing, rhonchi or rales.   Musculoskeletal:      Cervical back: Neck supple.      Right lower leg:  "No edema.      Left lower leg: No edema.   Lymphadenopathy:      Cervical: No cervical adenopathy.      Upper Body:      Right upper body: No axillary adenopathy.   Skin:     General: Skin is warm and dry.      Findings: No lesion or rash.   Neurological:      General: No focal deficit present.      Mental Status: He is alert and oriented to person, place, and time.   Psychiatric:         Mood and Affect: Affect normal. Mood is not anxious or depressed.         Vital Signs:   /69 (BP Location: Left arm, Patient Position: Sitting, Cuff Size: Large Adult)   Pulse 60   Temp 97.8 °F (36.6 °C) (Temporal)   Resp 16   Ht 177.8 cm (70\")   Wt 111 kg (245 lb)   SpO2 96% Comment: RA  BMI 35.15 kg/m²        Result Review :   The following data was reviewed by: MYKEL Dahl on 03/12/2024:  CMP          1/11/2024    10:03 3/8/2024    09:29   CMP   Glucose 162  136    BUN 23  27    Creatinine 1.35  1.32    EGFR 54.1  55.6    Sodium 138  140    Potassium 4.1  4.2    Chloride 104  102    Calcium 9.3  9.0    Total Protein 7.0  6.8    Albumin 4.6  4.4    Globulin 2.4  2.4    Total Bilirubin 0.4  0.4    Alkaline Phosphatase 91  77    AST (SGOT) 17  21    ALT (SGPT) 20  18    Albumin/Globulin Ratio 1.9  1.8    BUN/Creatinine Ratio 17.0  20.5    Anion Gap 10.0  10.5      CBC w/diff          1/11/2024    10:03 3/8/2024    09:29   CBC w/Diff   WBC 5.43  6.25    RBC 4.13  4.12    Hemoglobin 12.4  12.8    Hematocrit 37.5  38.0    MCV 90.8  92.2    MCH 30.0  31.1    MCHC 33.1  33.7    RDW 13.0  13.3    Platelets 172  161    Neutrophil Rel % 56.1  58.7    Immature Granulocyte Rel % 0.6  0.6    Lymphocyte Rel % 28.2  27.2    Monocyte Rel % 9.0  9.0    Eosinophil Rel % 5.5  4.0    Basophil Rel % 0.6  0.5      Data reviewed : Radiologic studies chest CT 4/10/2023, pulmonary function test 1/13/2020 and my last office note    Procedures        Assessment and Plan    Diagnoses and all orders for this visit:    1. Chronic " obstructive pulmonary disease, unspecified COPD type (Primary)  -     Fluticasone-Umeclidin-Vilant (TRELEGY) 100-62.5-25 MCG/ACT inhaler; Inhale 1 puff Daily.  Dispense: 180 each; Refill: 3    2. Dyspnea on exertion    3. Nicotine dependence, cigarettes, in remission    4.  Continue Trelegy as prescribed.  Rinse mouth out after each use.  5.  Continue albuterol as needed.  6.  Patient cleared for esophagogastroduodenoscopy.  7.  Follow-up in 1 year, sooner if needed.      Follow Up   Return in about 1 year (around 3/12/2025) for Recheck.  Patient was given instructions and counseling regarding his condition or for health maintenance advice. Please see specific information pulled into the AVS if appropriate.

## 2024-04-01 NOTE — PRE-PROCEDURE INSTRUCTIONS
"Instructed on date and arrival time of 0900. Come to entrance \"C\".  Must have  over age 18 to drive home.  May have two visitors; however, children under 12 must stay in waiting room.  Discussed diet/NPO.  May take medications as usual except for blood thinners, diabetic medications, or weight loss medications.  Verbalized understanding of instructions given.  Instructed to call for questions or concerns.  Cardiac and pulmonary clearances noted in chart.  "

## 2024-04-05 ENCOUNTER — ANESTHESIA EVENT (OUTPATIENT)
Dept: GASTROENTEROLOGY | Facility: HOSPITAL | Age: 79
End: 2024-04-05
Payer: MEDICARE

## 2024-04-05 RX ORDER — SODIUM CHLORIDE, SODIUM LACTATE, POTASSIUM CHLORIDE, CALCIUM CHLORIDE 600; 310; 30; 20 MG/100ML; MG/100ML; MG/100ML; MG/100ML
30 INJECTION, SOLUTION INTRAVENOUS CONTINUOUS
Status: CANCELLED | OUTPATIENT
Start: 2024-04-05

## 2024-04-05 NOTE — ANESTHESIA PREPROCEDURE EVALUATION
Anesthesia Evaluation     NPO Solid Status: > 8 hours  NPO Liquid Status: > 2 hours           Airway   Mallampati: I  TM distance: >3 FB  Neck ROM: full  No difficulty expected  Dental    (+) edentulous    Pulmonary    (+) a smoker Former, COPD,decreased breath sounds  Cardiovascular - normal exam    Patient on routine beta blocker    (+) hypertension 2 medications or greater, valvular problems/murmurs AS, past MI (2011)  >12 months, CAD, cardiac stents (2011) more than 12 months ago , dysrhythmias (RBBB), hyperlipidemia      Neuro/Psych  GI/Hepatic/Renal/Endo    (+) obesity, GERD, renal disease (CKD stage 3)- CRI, thyroid problem thyroid nodules    Musculoskeletal     Abdominal   (+) obese   Substance History      OB/GYN          Other   arthritis,   history of cancer    ROS/Med Hx Other:  Cardiac Clearance- Dr. Kevin- Moderate risk  Pulmonary clearance Sindy Edwards APRMICHAEL      Date/Time: 3/16/2023 4:05 PM  Performed by: Fernie Kevin MD  Authorized by: Fernie Kevin MD   Comparison: compared with previous ECG   Similar to previous ECG  Rhythm: sinus rhythm  Conduction: right bundle branch block and left anterior fascicular block  Comments: Probable left ventricular hypertrophy  Abnormal Q waves possible LVH      3/27/23 Echo  ·  Left ventricular systolic function is normal. Calculated left ventricular EF = 65%  ·  Left ventricular wall thickness is consistent with mild concentric hypertrophy.  ·  Left ventricular diastolic function was normal.  ·  Mild aortic valve stenosis is present.  ·  Borderline dilation of the aortic root is present.                      Anesthesia Plan    ASA 3     general   total IV anesthesia  (Total IV Anesthesia    Patient understands anesthesia not responsible for dental damage.  )  intravenous induction     Anesthetic plan, risks, benefits, and alternatives have been provided, discussed and informed consent has been obtained with: patient.    Plan discussed with CRNA.        CODE  STATUS:

## 2024-04-08 ENCOUNTER — HOSPITAL ENCOUNTER (OUTPATIENT)
Facility: HOSPITAL | Age: 79
Setting detail: HOSPITAL OUTPATIENT SURGERY
Discharge: HOME | End: 2024-04-08
Attending: INTERNAL MEDICINE | Admitting: INTERNAL MEDICINE
Payer: MEDICARE

## 2024-04-08 ENCOUNTER — ANESTHESIA (OUTPATIENT)
Dept: GASTROENTEROLOGY | Facility: HOSPITAL | Age: 79
End: 2024-04-08
Payer: MEDICARE

## 2024-04-08 VITALS
TEMPERATURE: 97 F | HEIGHT: 70 IN | RESPIRATION RATE: 14 BRPM | SYSTOLIC BLOOD PRESSURE: 113 MMHG | BODY MASS INDEX: 34.21 KG/M2 | WEIGHT: 238.98 LBS | HEART RATE: 56 BPM | DIASTOLIC BLOOD PRESSURE: 67 MMHG | OXYGEN SATURATION: 96 %

## 2024-04-08 DIAGNOSIS — K21.9 GASTROESOPHAGEAL REFLUX DISEASE, UNSPECIFIED WHETHER ESOPHAGITIS PRESENT: ICD-10-CM

## 2024-04-08 DIAGNOSIS — Z87.19 HISTORY OF BARRETT'S ESOPHAGUS: ICD-10-CM

## 2024-04-08 LAB — GLUCOSE BLDC GLUCOMTR-MCNC: 118 MG/DL (ref 70–99)

## 2024-04-08 PROCEDURE — 82948 REAGENT STRIP/BLOOD GLUCOSE: CPT

## 2024-04-08 PROCEDURE — 43239 EGD BIOPSY SINGLE/MULTIPLE: CPT | Performed by: INTERNAL MEDICINE

## 2024-04-08 PROCEDURE — 88305 TISSUE EXAM BY PATHOLOGIST: CPT | Performed by: INTERNAL MEDICINE

## 2024-04-08 PROCEDURE — 25010000002 PROPOFOL 10 MG/ML EMULSION: Performed by: NURSE ANESTHETIST, CERTIFIED REGISTERED

## 2024-04-08 PROCEDURE — 25810000003 LACTATED RINGERS PER 1000 ML

## 2024-04-08 RX ORDER — SODIUM CHLORIDE, SODIUM LACTATE, POTASSIUM CHLORIDE, CALCIUM CHLORIDE 600; 310; 30; 20 MG/100ML; MG/100ML; MG/100ML; MG/100ML
30 INJECTION, SOLUTION INTRAVENOUS CONTINUOUS
Status: DISCONTINUED | OUTPATIENT
Start: 2024-04-08 | End: 2024-04-08 | Stop reason: HOSPADM

## 2024-04-08 RX ORDER — LIDOCAINE HYDROCHLORIDE 20 MG/ML
INJECTION, SOLUTION EPIDURAL; INFILTRATION; INTRACAUDAL; PERINEURAL AS NEEDED
Status: DISCONTINUED | OUTPATIENT
Start: 2024-04-08 | End: 2024-04-08 | Stop reason: SURG

## 2024-04-08 RX ORDER — PROPOFOL 10 MG/ML
VIAL (ML) INTRAVENOUS AS NEEDED
Status: DISCONTINUED | OUTPATIENT
Start: 2024-04-08 | End: 2024-04-08 | Stop reason: SURG

## 2024-04-08 RX ORDER — SODIUM CHLORIDE 9 MG/ML
9 INJECTION, SOLUTION INTRAVENOUS CONTINUOUS
Status: DISCONTINUED | OUTPATIENT
Start: 2024-04-08 | End: 2024-04-08 | Stop reason: HOSPADM

## 2024-04-08 RX ADMIN — PROPOFOL 20 MG: 10 INJECTION, EMULSION INTRAVENOUS at 10:11

## 2024-04-08 RX ADMIN — SODIUM CHLORIDE, POTASSIUM CHLORIDE, SODIUM LACTATE AND CALCIUM CHLORIDE 30 ML/HR: 600; 310; 30; 20 INJECTION, SOLUTION INTRAVENOUS at 10:02

## 2024-04-08 RX ADMIN — LIDOCAINE HYDROCHLORIDE 100 MG: 20 INJECTION, SOLUTION EPIDURAL; INFILTRATION; INTRACAUDAL; PERINEURAL at 10:11

## 2024-04-08 RX ADMIN — PROPOFOL 150 MCG/KG/MIN: 10 INJECTION, EMULSION INTRAVENOUS at 10:11

## 2024-04-08 NOTE — H&P
Pre Procedure History & Physical    Chief Complaint:   Tobias's  gerd    Subjective     HPI:   As above    Past Medical History:   Past Medical History:   Diagnosis Date    Anemia     Arthritis     Tobias esophagus     CAD S/P percutaneous coronary angioplasty 08/23/2021    1 stent placed in 2011    Chronic bronchitis     COPD     INHALERS    Emphysema lung     Essential hypertension 08/23/2021    GERD (gastroesophageal reflux disease)     Heart attack 2011    Hemorrhoids     RECTAL PROB     Hernia cerebri     History of aortic dissection 08/23/2021    Hyperlipidemia LDL goal <70 08/23/2021    Melena        Past Surgical History:  Past Surgical History:   Procedure Laterality Date    COLONOSCOPY  2019    Dr. Griffin    COLONOSCOPY N/A 11/28/2022    Procedure: COLONOSCOPY;  Surgeon: Amado Griffin MD;  Location: [facility] ENDOSCOPY;  Service: Gastroenterology;  Laterality: N/A;  DIVERTICULOSIS, HEMORRHOIDS    HERNIA REPAIR  1990         ORTHOPEDIC SURGERY      [facility]       Family History:  Family History   Problem Relation Age of Onset    Heart disease Father     Colon cancer Brother     Cancer Brother        Social History:   reports that he quit smoking about 4 years ago. His smoking use included cigarettes. He started smoking about 57 years ago. He has a 53 pack-year smoking history. He has been exposed to tobacco smoke. He has never used smokeless tobacco. He reports that he does not drink alcohol and does not use drugs.    Medications:   Medications Prior to Admission   Medication Sig Dispense Refill Last Dose    Accu-Chek Guide test strip        Accu-Chek Softclix Lancets lancets        albuterol sulfate  (90 Base) MCG/ACT inhaler Inhale 2 puffs Every 4 (Four) Hours As Needed for Wheezing. 54 g 11     aspirin 81 MG EC tablet aspirin 81 mg oral tablet,delayed release (DR/EC) take 1 tablet (81 mg) by oral route once daily   Active       atorvastatin (LIPITOR) 80 MG tablet TAKE 1 TABLET EVERY NIGHT 90  "tablet 1     cyanocobalamin (VITAMIN B-12) 500 MCG tablet Take 1 tablet by mouth.       ezetimibe (ZETIA) 10 MG tablet Take 1 tablet by mouth Daily. 90 tablet 1     Fluticasone-Umeclidin-Vilant (TRELEGY) 100-62.5-25 MCG/ACT inhaler Inhale 1 puff Daily. 180 each 3     glipizide (GLUCOTROL XL) 2.5 MG 24 hr tablet        metFORMIN (GLUCOPHAGE) 500 MG tablet        metoprolol succinate XL (TOPROL-XL) 100 MG 24 hr tablet TAKE 1 TABLET BY MOUTH DAILY 90 tablet 1     Olmesartan-amLODIPine-HCTZ 40-10-25 MG tablet 40/10/25 mg once a day 90 tablet 3     omega-3 acid ethyl esters (LOVAZA) 1 g capsule Take 2 capsules by mouth 2 (Two) Times a Day. 2 AM and 2PM 360 capsule 1     pantoprazole (PROTONIX) 40 MG EC tablet TAKE 1 TABLET EVERY DAY 90 tablet 2     sertraline (ZOLOFT) 50 MG tablet TAKE 1 TABLET EVERY DAY 90 tablet 1        Allergies:  Patient has no known allergies.        Objective     Blood pressure 141/76, pulse 59, temperature 97.8 °F (36.6 °C), temperature source Temporal, resp. rate 20, height 177.8 cm (70\"), weight 108 kg (238 lb 15.7 oz), SpO2 97%.    Physical Exam   Constitutional: Pt is oriented to person, place, and time and well-developed, well-nourished, and in no distress.   Mouth/Throat: Oropharynx is clear and moist.   Neck: Normal range of motion.   Cardiovascular: Normal rate, regular rhythm and normal heart sounds.    Pulmonary/Chest: Effort normal and breath sounds normal.   Abdominal: Soft. Nontender  Skin: Skin is warm and dry.   Psychiatric: Mood, memory, affect and judgment normal.     Assessment & Plan     Diagnosis:  Tobias's  gerd    Anticipated Surgical Procedure:  egd    The risks, benefits, and alternatives of this procedure have been discussed with the patient or the responsible party- the patient understands and agrees to proceed.            "

## 2024-04-08 NOTE — ANESTHESIA POSTPROCEDURE EVALUATION
Patient: Frank Winter    Procedure Summary       Date: 04/08/24 Room / Location: [facility] ENDOSCOPY 2 / [facility] ENDOSCOPY    Anesthesia Start: 1007 Anesthesia Stop: 1026    Procedure: ESOPHAGOGASTRODUODENOSCOPY WITH BIOPSY POLYPECTOMY, BIOPSY Diagnosis:       Gastroesophageal reflux disease, unspecified whether esophagitis present      History of Tobias's esophagus      (Gastroesophageal reflux disease, unspecified whether esophagitis present [K21.9])      (History of Tobias's esophagus [Z87.19])    Surgeons: Amado Griffin MD Provider: Michael Cheung CRNA    Anesthesia Type: general ASA Status: 3            Anesthesia Type: general    Vitals  Vitals Value Taken Time   /67 04/08/24 1041   Temp 36.1 °C (97 °F) 04/08/24 1040   Pulse 55 04/08/24 1042   Resp 14 04/08/24 1040   SpO2 96 % 04/08/24 1042   Vitals shown include unfiled device data.        Post Anesthesia Care and Evaluation    Post-procedure mental status: acceptable.  Pain management: satisfactory to patient    Airway patency: patent  Anesthetic complications: No anesthetic complications    Cardiovascular status: acceptable  Respiratory status: acceptable    Comments: Per chart review

## 2024-04-09 LAB
CYTO UR: NORMAL
LAB AP CASE REPORT: NORMAL
LAB AP CLINICAL INFORMATION: NORMAL
PATH REPORT.FINAL DX SPEC: NORMAL
PATH REPORT.GROSS SPEC: NORMAL

## 2024-04-10 ENCOUNTER — TELEPHONE (OUTPATIENT)
Dept: GASTROENTEROLOGY | Facility: CLINIC | Age: 79
End: 2024-04-10
Payer: MEDICARE

## 2024-04-10 NOTE — TELEPHONE ENCOUNTER
----- Message from MYKEL Galvez sent at 4/10/2024  7:42 AM EDT -----  I have reviewed the patients upper endoscopy and pathology with Dr. Griffin. Esophageal biopsies consistent with reflux esophagitis. Polyp biopsies from stomach is benign. Biopsies are negative for dysplasia, metaplasia, and malignancy.

## 2024-04-10 NOTE — TELEPHONE ENCOUNTER
Hub staff attempted to follow warm transfer process and was unsuccessful     Caller: ZAIN BASS    Relationship to patient: SELF    Best call back number: [phone]    Patient is needing: CALLING BACK GREGORY FOR RESULTS

## 2024-04-11 ENCOUNTER — HOSPITAL ENCOUNTER (OUTPATIENT)
Dept: CT IMAGING | Facility: HOSPITAL | Age: 79
Discharge: HOME | End: 2024-04-11
Payer: MEDICARE

## 2024-04-11 DIAGNOSIS — I71.21 ANEURYSM OF ASCENDING AORTA WITHOUT RUPTURE: ICD-10-CM

## 2024-04-11 PROCEDURE — 71250 CT THORAX DX C-: CPT

## 2024-04-12 ENCOUNTER — TELEPHONE (OUTPATIENT)
Dept: CARDIOLOGY | Facility: CLINIC | Age: 79
End: 2024-04-12
Payer: MEDICARE

## 2024-04-12 NOTE — TELEPHONE ENCOUNTER
----- Message from MYKEL Sanders sent at 4/11/2024  4:04 PM EDT -----  CT chest shows stable descending thoracic aorta post endovascular repair.  Follow-up as scheduled

## 2024-05-13 ENCOUNTER — LAB (OUTPATIENT)
Dept: LAB | Facility: HOSPITAL | Age: 79
End: 2024-05-13
Payer: MEDICARE

## 2024-05-13 ENCOUNTER — TRANSCRIBE ORDERS (OUTPATIENT)
Dept: ADMINISTRATIVE | Facility: HOSPITAL | Age: 79
End: 2024-05-13
Payer: MEDICARE

## 2024-05-13 DIAGNOSIS — I10 ESSENTIAL HYPERTENSION, MALIGNANT: Primary | ICD-10-CM

## 2024-05-13 DIAGNOSIS — I10 ESSENTIAL HYPERTENSION, MALIGNANT: ICD-10-CM

## 2024-05-13 DIAGNOSIS — E78.2 MIXED HYPERLIPIDEMIA: ICD-10-CM

## 2024-05-13 DIAGNOSIS — E11.65 TYPE 2 DIABETES MELLITUS WITH HYPERGLYCEMIA, UNSPECIFIED WHETHER LONG TERM INSULIN USE: ICD-10-CM

## 2024-05-13 LAB
ALBUMIN SERPL-MCNC: 4.7 G/DL (ref 3.5–5.2)
ALBUMIN UR-MCNC: <1.2 MG/DL
ALBUMIN/GLOB SERPL: 1.8 G/DL
ALP SERPL-CCNC: 76 U/L (ref 39–117)
ALT SERPL W P-5'-P-CCNC: 19 U/L (ref 1–41)
ANION GAP SERPL CALCULATED.3IONS-SCNC: 13 MMOL/L (ref 5–15)
AST SERPL-CCNC: 16 U/L (ref 1–40)
BASOPHILS # BLD AUTO: 0.04 10*3/MM3 (ref 0–0.2)
BASOPHILS NFR BLD AUTO: 0.6 % (ref 0–1.5)
BILIRUB SERPL-MCNC: 0.4 MG/DL (ref 0–1.2)
BUN SERPL-MCNC: 35 MG/DL (ref 8–23)
BUN/CREAT SERPL: 23.5 (ref 7–25)
CALCIUM SPEC-SCNC: 9.4 MG/DL (ref 8.6–10.5)
CHLORIDE SERPL-SCNC: 103 MMOL/L (ref 98–107)
CHOLEST SERPL-MCNC: 117 MG/DL (ref 0–200)
CO2 SERPL-SCNC: 25 MMOL/L (ref 22–29)
CREAT SERPL-MCNC: 1.49 MG/DL (ref 0.76–1.27)
CREAT UR-MCNC: 132.2 MG/DL
DEPRECATED RDW RBC AUTO: 45.5 FL (ref 37–54)
EGFRCR SERPLBLD CKD-EPI 2021: 48 ML/MIN/1.73
EOSINOPHIL # BLD AUTO: 0.28 10*3/MM3 (ref 0–0.4)
EOSINOPHIL NFR BLD AUTO: 4.3 % (ref 0.3–6.2)
ERYTHROCYTE [DISTWIDTH] IN BLOOD BY AUTOMATED COUNT: 13.5 % (ref 12.3–15.4)
GLOBULIN UR ELPH-MCNC: 2.6 GM/DL
GLUCOSE SERPL-MCNC: 124 MG/DL (ref 65–99)
HBA1C MFR BLD: 6 % (ref 4.8–5.6)
HCT VFR BLD AUTO: 37.7 % (ref 37.5–51)
HDLC SERPL-MCNC: 35 MG/DL (ref 40–60)
HGB BLD-MCNC: 12.6 G/DL (ref 13–17.7)
IMM GRANULOCYTES # BLD AUTO: 0.03 10*3/MM3 (ref 0–0.05)
IMM GRANULOCYTES NFR BLD AUTO: 0.5 % (ref 0–0.5)
LDLC SERPL CALC-MCNC: 50 MG/DL (ref 0–100)
LDLC/HDLC SERPL: 1.23 {RATIO}
LYMPHOCYTES # BLD AUTO: 1.73 10*3/MM3 (ref 0.7–3.1)
LYMPHOCYTES NFR BLD AUTO: 26.5 % (ref 19.6–45.3)
MCH RBC QN AUTO: 30.9 PG (ref 26.6–33)
MCHC RBC AUTO-ENTMCNC: 33.4 G/DL (ref 31.5–35.7)
MCV RBC AUTO: 92.4 FL (ref 79–97)
MONOCYTES # BLD AUTO: 0.65 10*3/MM3 (ref 0.1–0.9)
MONOCYTES NFR BLD AUTO: 9.9 % (ref 5–12)
NEUTROPHILS NFR BLD AUTO: 3.81 10*3/MM3 (ref 1.7–7)
NEUTROPHILS NFR BLD AUTO: 58.2 % (ref 42.7–76)
NRBC BLD AUTO-RTO: 0 /100 WBC (ref 0–0.2)
PLATELET # BLD AUTO: 173 10*3/MM3 (ref 140–450)
PMV BLD AUTO: 9.8 FL (ref 6–12)
POTASSIUM SERPL-SCNC: 3.9 MMOL/L (ref 3.5–5.2)
PROT SERPL-MCNC: 7.3 G/DL (ref 6–8.5)
RBC # BLD AUTO: 4.08 10*6/MM3 (ref 4.14–5.8)
SODIUM SERPL-SCNC: 141 MMOL/L (ref 136–145)
TRIGL SERPL-MCNC: 195 MG/DL (ref 0–150)
TSH SERPL DL<=0.05 MIU/L-ACNC: 2.57 UIU/ML (ref 0.27–4.2)
VLDLC SERPL-MCNC: 32 MG/DL (ref 5–40)
WBC NRBC COR # BLD AUTO: 6.54 10*3/MM3 (ref 3.4–10.8)

## 2024-05-13 PROCEDURE — 80061 LIPID PANEL: CPT

## 2024-05-13 PROCEDURE — 82043 UR ALBUMIN QUANTITATIVE: CPT

## 2024-05-13 PROCEDURE — 84443 ASSAY THYROID STIM HORMONE: CPT

## 2024-05-13 PROCEDURE — 83036 HEMOGLOBIN GLYCOSYLATED A1C: CPT

## 2024-05-13 PROCEDURE — 36415 COLL VENOUS BLD VENIPUNCTURE: CPT

## 2024-05-13 PROCEDURE — 80053 COMPREHEN METABOLIC PANEL: CPT

## 2024-05-13 PROCEDURE — 85025 COMPLETE CBC W/AUTO DIFF WBC: CPT

## 2024-05-13 PROCEDURE — 82570 ASSAY OF URINE CREATININE: CPT

## 2024-05-14 ENCOUNTER — TELEPHONE (OUTPATIENT)
Dept: CARDIOLOGY | Facility: CLINIC | Age: 79
End: 2024-05-14
Payer: MEDICARE

## 2024-05-14 DIAGNOSIS — I10 ESSENTIAL HYPERTENSION: Primary | ICD-10-CM

## 2024-05-14 RX ORDER — AMLODIPINE AND OLMESARTAN MEDOXOMIL 10; 40 MG/1; MG/1
1 TABLET ORAL DAILY
Qty: 90 TABLET | Refills: 3 | Status: SHIPPED | OUTPATIENT
Start: 2024-05-14

## 2024-05-14 NOTE — TELEPHONE ENCOUNTER
Faviola Dockery APRN French, Tonya, RN  Renal function is slightly declined,would recommend changing olmesartan-amlodipine-hctz to olmesartan-amlodipine 40-10 mg daily to remove hctz. Repeat BMP in 2 weeks. Electrolytes and liver enzymes are good. Lipid panel shows triglycerides are mildly elevated, recommend low fat/low carb diet with less red meat.    SW patient regarding results and recommendations. Voiced understanding.

## 2024-06-12 DIAGNOSIS — K21.9 GASTROESOPHAGEAL REFLUX DISEASE WITHOUT ESOPHAGITIS: ICD-10-CM

## 2024-06-12 DIAGNOSIS — E78.5 HYPERLIPIDEMIA LDL GOAL <70: ICD-10-CM

## 2024-06-12 RX ORDER — PANTOPRAZOLE SODIUM 40 MG/1
40 TABLET, DELAYED RELEASE ORAL DAILY
Qty: 90 TABLET | Refills: 1 | Status: SHIPPED | OUTPATIENT
Start: 2024-06-12

## 2024-06-12 RX ORDER — EZETIMIBE 10 MG/1
10 TABLET ORAL DAILY
Qty: 90 TABLET | Refills: 1 | Status: SHIPPED | OUTPATIENT
Start: 2024-06-12

## 2024-06-17 RX ORDER — OMEGA-3-ACID ETHYL ESTERS 1 G/1
CAPSULE, LIQUID FILLED ORAL
Qty: 360 CAPSULE | Refills: 1 | Status: SHIPPED | OUTPATIENT
Start: 2024-06-17

## 2024-07-08 ENCOUNTER — OFFICE VISIT (OUTPATIENT)
Dept: CARDIOLOGY | Facility: CLINIC | Age: 79
End: 2024-07-08
Payer: MEDICARE

## 2024-07-08 VITALS
HEIGHT: 70 IN | WEIGHT: 232 LBS | BODY MASS INDEX: 33.21 KG/M2 | SYSTOLIC BLOOD PRESSURE: 140 MMHG | HEART RATE: 62 BPM | DIASTOLIC BLOOD PRESSURE: 76 MMHG

## 2024-07-08 DIAGNOSIS — I10 ESSENTIAL HYPERTENSION: ICD-10-CM

## 2024-07-08 DIAGNOSIS — Z98.61 CAD S/P PERCUTANEOUS CORONARY ANGIOPLASTY: Primary | ICD-10-CM

## 2024-07-08 DIAGNOSIS — E78.5 HYPERLIPIDEMIA LDL GOAL <70: ICD-10-CM

## 2024-07-08 DIAGNOSIS — I25.10 CAD S/P PERCUTANEOUS CORONARY ANGIOPLASTY: Primary | ICD-10-CM

## 2024-07-08 DIAGNOSIS — I35.0 AORTIC STENOSIS, MILD: ICD-10-CM

## 2024-07-08 PROCEDURE — 3077F SYST BP >= 140 MM HG: CPT | Performed by: INTERNAL MEDICINE

## 2024-07-08 PROCEDURE — 99214 OFFICE O/P EST MOD 30 MIN: CPT | Performed by: INTERNAL MEDICINE

## 2024-07-08 PROCEDURE — 3078F DIAST BP <80 MM HG: CPT | Performed by: INTERNAL MEDICINE

## 2024-07-08 RX ORDER — NEBIVOLOL 20 MG/1
20 TABLET ORAL DAILY
Qty: 90 TABLET | Refills: 3 | Status: SHIPPED | OUTPATIENT
Start: 2024-07-08

## 2024-07-08 NOTE — PROGRESS NOTES
Chief Complaint  6 MONTH FOLLOW UP  and CAD S/P percutaneous coronary angioplasty    Subjective    Patient is doing well not having any anginal chest pain he did have to stop his hydrochlorothiazide for unknown reasons as blood pressure has been mildly elevated  Past Medical History:   Diagnosis Date    Anemia     Arthritis     Tobias esophagus     CAD S/P percutaneous coronary angioplasty 08/23/2021    1 stent placed in 2011    Chronic bronchitis     COPD     INHALERS    Diabetes mellitus     Diverticulosis     Emphysema lung     Essential hypertension 08/23/2021    GERD (gastroesophageal reflux disease)     Heart attack 2011    Hemorrhoids     RECTAL PROB     Hernia cerebri     History of aortic dissection 08/23/2021    Hyperlipidemia LDL goal <70 08/23/2021    Melena          Current Outpatient Medications:     Accu-Chek Guide test strip, , Disp: , Rfl:     Accu-Chek Softclix Lancets lancets, , Disp: , Rfl:     albuterol sulfate  (90 Base) MCG/ACT inhaler, Inhale 2 puffs Every 4 (Four) Hours As Needed for Wheezing., Disp: 54 g, Rfl: 11    amlodipine-olmesartan (FITO) 10-40 MG per tablet, Take 1 tablet by mouth Daily., Disp: 90 tablet, Rfl: 3    aspirin 81 MG EC tablet, aspirin 81 mg oral tablet,delayed release (DR/EC) take 1 tablet (81 mg) by oral route once daily   Active, Disp: , Rfl:     atorvastatin (LIPITOR) 80 MG tablet, TAKE 1 TABLET EVERY NIGHT, Disp: 90 tablet, Rfl: 1    cyanocobalamin (VITAMIN B-12) 500 MCG tablet, Take 1 tablet by mouth., Disp: , Rfl:     ezetimibe (ZETIA) 10 MG tablet, TAKE 1 TABLET BY MOUTH DAILY, Disp: 90 tablet, Rfl: 1    Fluticasone-Umeclidin-Vilant (TRELEGY) 100-62.5-25 MCG/ACT inhaler, Inhale 1 puff Daily., Disp: 180 each, Rfl: 3    glipizide (GLUCOTROL XL) 2.5 MG 24 hr tablet, , Disp: , Rfl:     metFORMIN (GLUCOPHAGE) 500 MG tablet, , Disp: , Rfl:     omega-3 acid ethyl esters (LOVAZA) 1 g capsule, TAKE 2 CAPSULES BY MOUTH EVERY MORNING AND TAKE TWO CAPSULES BY MOUTH IN  "THE EVENING, Disp: 360 capsule, Rfl: 1    pantoprazole (PROTONIX) 40 MG EC tablet, TAKE 1 TABLET BY MOUTH DAILY, Disp: 90 tablet, Rfl: 1    sertraline (ZOLOFT) 50 MG tablet, TAKE 1 TABLET EVERY DAY, Disp: 90 tablet, Rfl: 1    nebivolol (BYSTOLIC) 20 MG tablet, Take 1 tablet by mouth Daily., Disp: 90 tablet, Rfl: 3    Medications Discontinued During This Encounter   Medication Reason    metoprolol succinate XL (TOPROL-XL) 100 MG 24 hr tablet      No Known Allergies     Social History     Tobacco Use    Smoking status: Former     Current packs/day: 0.00     Average packs/day: 1 pack/day for 53.0 years (53.0 ttl pk-yrs)     Types: Cigarettes     Start date: 1966     Quit date: 2019     Years since quittin.6     Passive exposure: Past    Smokeless tobacco: Never   Vaping Use    Vaping status: Never Used   Substance Use Topics    Alcohol use: Never    Drug use: Never       Family History   Problem Relation Age of Onset    Heart disease Father     Colon cancer Brother     Cancer Brother         Objective     /76   Pulse 62   Ht 177.8 cm (70\")   Wt 105 kg (232 lb)   BMI 33.29 kg/m²       Physical Exam    General Appearance:   no acute distress  Alert and oriented x3  HENT:   lips not cyanotic  Atraumatic  Neck:  No jvd   supple  Respiratory:  no respiratory distress  normal breath sounds  no rales  Cardiovascular:  Regular rate and rhythm  no S3, no S4   2/6 systolic murmur  no rub  Extremities  No cyanosis  lower extremity edema: none    Skin:   warm, dry  No rashes      Result Review :     No results found for: \"PROBNP\"  CMP          2024    10:03 3/8/2024    09:29 2024    09:57   CMP   Glucose 162  136  124    BUN 23  27  35    Creatinine 1.35  1.32  1.49    EGFR 54.1  55.6  48.0    Sodium 138  140  141    Potassium 4.1  4.2  3.9    Chloride 104  102  103    Calcium 9.3  9.0  9.4    Total Protein 7.0  6.8  7.3    Albumin 4.6  4.4  4.7    Globulin 2.4  2.4  2.6    Total Bilirubin 0.4  0.4  " "0.4    Alkaline Phosphatase 91  77  76    AST (SGOT) 17  21  16    ALT (SGPT) 20  18  19    Albumin/Globulin Ratio 1.9  1.8  1.8    BUN/Creatinine Ratio 17.0  20.5  23.5    Anion Gap 10.0  10.5  13.0      CBC w/diff          1/11/2024    10:03 3/8/2024    09:29 5/13/2024    09:57   CBC w/Diff   WBC 5.43  6.25  6.54    RBC 4.13  4.12  4.08    Hemoglobin 12.4  12.8  12.6    Hematocrit 37.5  38.0  37.7    MCV 90.8  92.2  92.4    MCH 30.0  31.1  30.9    MCHC 33.1  33.7  33.4    RDW 13.0  13.3  13.5    Platelets 172  161  173    Neutrophil Rel % 56.1  58.7  58.2    Immature Granulocyte Rel % 0.6  0.6  0.5    Lymphocyte Rel % 28.2  27.2  26.5    Monocyte Rel % 9.0  9.0  9.9    Eosinophil Rel % 5.5  4.0  4.3    Basophil Rel % 0.6  0.5  0.6       Lab Results   Component Value Date    TSH 2.570 05/13/2024      Lab Results   Component Value Date    FREET4 1.21 01/11/2024      No results found for: \"DDIMERQUANT\"  Magnesium   Date Value Ref Range Status   03/08/2024 1.7 1.6 - 2.4 mg/dL Final      No results found for: \"DIGOXIN\"   Lab Results   Component Value Date    TROPONINT <0.01 11/17/2019           Lipid Panel          1/11/2024    10:03 5/13/2024    09:57   Lipid Panel   Total Cholesterol 115  117    Triglycerides 122  195    HDL Cholesterol 38  35    VLDL Cholesterol 22  32    LDL Cholesterol  55  50    LDL/HDL Ratio 1.38  1.23      No results found for: \"POCTROP\"    Results for orders placed in visit on 03/27/23    Adult Transthoracic Echo Complete W/ Cont if Necessary Per Protocol    Interpretation Summary    Left ventricular systolic function is normal. Calculated left ventricular EF = 65%    Left ventricular wall thickness is consistent with mild concentric hypertrophy.    Left ventricular diastolic function was normal.    Mild aortic valve stenosis is present.    Borderline dilation of the aortic root is present.                 Diagnoses and all orders for this visit:    1. CAD S/P percutaneous coronary angioplasty " (Primary)  Assessment & Plan:  Patient is doing well no ongoing angina continue with chronic aspirin 81 mg daily        2. Essential hypertension  Assessment & Plan:  Blood pressure is elevated will change his Toprol to Bystolic 20 mg daily     Orders:  -     nebivolol (BYSTOLIC) 20 MG tablet; Take 1 tablet by mouth Daily.  Dispense: 90 tablet; Refill: 3    3. Aortic stenosis, mild    4. Hyperlipidemia LDL goal <70  Assessment & Plan:  LDL is at goal continue with Zetia 10 mg daily and Lipitor 80 nightly               Follow Up     Return in about 6 months (around 1/8/2025) for Follow with Faviola Dockery.          Patient was given instructions and counseling regarding his condition or for health maintenance advice. Please see specific information pulled into the AVS if appropriate.

## 2024-09-04 RX ORDER — METOPROLOL SUCCINATE 100 MG/1
100 TABLET, EXTENDED RELEASE ORAL DAILY
Qty: 90 TABLET | Refills: 1 | Status: SHIPPED | OUTPATIENT
Start: 2024-09-04

## 2024-09-16 ENCOUNTER — TRANSCRIBE ORDERS (OUTPATIENT)
Dept: LAB | Facility: HOSPITAL | Age: 79
End: 2024-09-16
Payer: MEDICARE

## 2024-09-16 ENCOUNTER — LAB (OUTPATIENT)
Dept: LAB | Facility: HOSPITAL | Age: 79
End: 2024-09-16
Payer: MEDICARE

## 2024-09-16 DIAGNOSIS — I10 ESSENTIAL HYPERTENSION: ICD-10-CM

## 2024-09-16 DIAGNOSIS — E11.22 TYPE 2 DIABETES MELLITUS WITH DIABETIC CHRONIC KIDNEY DISEASE, UNSPECIFIED CKD STAGE, UNSPECIFIED WHETHER LONG TERM INSULIN USE: ICD-10-CM

## 2024-09-16 DIAGNOSIS — I13.10 CARDIORENAL DISEASE: ICD-10-CM

## 2024-09-16 DIAGNOSIS — E11.22 TYPE 2 DIABETES MELLITUS WITH DIABETIC CHRONIC KIDNEY DISEASE, UNSPECIFIED CKD STAGE, UNSPECIFIED WHETHER LONG TERM INSULIN USE: Primary | ICD-10-CM

## 2024-09-16 LAB
ALBUMIN UR-MCNC: 2.5 MG/DL
ANION GAP SERPL CALCULATED.3IONS-SCNC: 8.9 MMOL/L (ref 5–15)
BUN SERPL-MCNC: 22 MG/DL (ref 8–23)
BUN/CREAT SERPL: 19.6 (ref 7–25)
CALCIUM SPEC-SCNC: 9.4 MG/DL (ref 8.6–10.5)
CHLORIDE SERPL-SCNC: 104 MMOL/L (ref 98–107)
CO2 SERPL-SCNC: 26.1 MMOL/L (ref 22–29)
CREAT SERPL-MCNC: 1.12 MG/DL (ref 0.76–1.27)
CREAT UR-MCNC: 88.5 MG/DL
EGFRCR SERPLBLD CKD-EPI 2021: 67.2 ML/MIN/1.73
GLUCOSE SERPL-MCNC: 113 MG/DL (ref 65–99)
HBA1C MFR BLD: 5.8 % (ref 4.8–5.6)
POTASSIUM SERPL-SCNC: 4.2 MMOL/L (ref 3.5–5.2)
SODIUM SERPL-SCNC: 139 MMOL/L (ref 136–145)

## 2024-09-16 PROCEDURE — 36415 COLL VENOUS BLD VENIPUNCTURE: CPT

## 2024-09-16 PROCEDURE — 83036 HEMOGLOBIN GLYCOSYLATED A1C: CPT

## 2024-09-16 PROCEDURE — 82570 ASSAY OF URINE CREATININE: CPT

## 2024-09-16 PROCEDURE — 82043 UR ALBUMIN QUANTITATIVE: CPT

## 2024-09-16 PROCEDURE — 80048 BASIC METABOLIC PNL TOTAL CA: CPT

## 2024-09-17 ENCOUNTER — TELEPHONE (OUTPATIENT)
Dept: CARDIOLOGY | Facility: CLINIC | Age: 79
End: 2024-09-17
Payer: MEDICARE

## 2024-12-11 RX ORDER — OMEGA-3-ACID ETHYL ESTERS 1 G/1
CAPSULE, LIQUID FILLED ORAL
Qty: 360 CAPSULE | Refills: 1 | Status: SHIPPED | OUTPATIENT
Start: 2024-12-11

## 2024-12-12 DIAGNOSIS — E78.5 HYPERLIPIDEMIA LDL GOAL <70: ICD-10-CM

## 2024-12-12 DIAGNOSIS — K21.9 GASTROESOPHAGEAL REFLUX DISEASE WITHOUT ESOPHAGITIS: ICD-10-CM

## 2024-12-12 RX ORDER — EZETIMIBE 10 MG/1
10 TABLET ORAL DAILY
Qty: 90 TABLET | Refills: 3 | Status: SHIPPED | OUTPATIENT
Start: 2024-12-12

## 2024-12-12 RX ORDER — PANTOPRAZOLE SODIUM 40 MG/1
40 TABLET, DELAYED RELEASE ORAL DAILY
Qty: 90 TABLET | Refills: 1 | OUTPATIENT
Start: 2024-12-12

## 2025-03-03 ENCOUNTER — LAB (OUTPATIENT)
Dept: LAB | Facility: HOSPITAL | Age: 80
End: 2025-03-03
Payer: MEDICARE

## 2025-03-03 ENCOUNTER — TRANSCRIBE ORDERS (OUTPATIENT)
Dept: LAB | Facility: HOSPITAL | Age: 80
End: 2025-03-03
Payer: MEDICARE

## 2025-03-03 DIAGNOSIS — N18.31 STAGE 3A CHRONIC KIDNEY DISEASE: ICD-10-CM

## 2025-03-03 DIAGNOSIS — E11.22 TYPE 2 DIABETES MELLITUS WITH DIABETIC CHRONIC KIDNEY DISEASE, UNSPECIFIED CKD STAGE, UNSPECIFIED WHETHER LONG TERM INSULIN USE: ICD-10-CM

## 2025-03-03 DIAGNOSIS — E78.2 HYPERLIPIDEMIA, MIXED: ICD-10-CM

## 2025-03-03 DIAGNOSIS — E11.65 INADEQUATELY CONTROLLED DIABETES MELLITUS: Primary | ICD-10-CM

## 2025-03-03 DIAGNOSIS — I13.10 CARDIORENAL DISEASE: ICD-10-CM

## 2025-03-03 DIAGNOSIS — E11.65 INADEQUATELY CONTROLLED DIABETES MELLITUS: ICD-10-CM

## 2025-03-03 LAB
ALBUMIN SERPL-MCNC: 4.4 G/DL (ref 3.5–5.2)
ALBUMIN UR-MCNC: 3.3 MG/DL
ALBUMIN/GLOB SERPL: 1.5 G/DL
ALP SERPL-CCNC: 78 U/L (ref 39–117)
ALT SERPL W P-5'-P-CCNC: 18 U/L (ref 1–41)
ANION GAP SERPL CALCULATED.3IONS-SCNC: 11 MMOL/L (ref 5–15)
AST SERPL-CCNC: 20 U/L (ref 1–40)
BASOPHILS # BLD AUTO: 0.03 10*3/MM3 (ref 0–0.2)
BASOPHILS NFR BLD AUTO: 0.4 % (ref 0–1.5)
BILIRUB SERPL-MCNC: 0.4 MG/DL (ref 0–1.2)
BUN SERPL-MCNC: 18 MG/DL (ref 8–23)
BUN/CREAT SERPL: 13.4 (ref 7–25)
CALCIUM SPEC-SCNC: 10.1 MG/DL (ref 8.6–10.5)
CHLORIDE SERPL-SCNC: 101 MMOL/L (ref 98–107)
CHOLEST SERPL-MCNC: 98 MG/DL (ref 0–200)
CO2 SERPL-SCNC: 27 MMOL/L (ref 22–29)
CREAT SERPL-MCNC: 1.34 MG/DL (ref 0.76–1.27)
CREAT UR-MCNC: 73.5 MG/DL
DEPRECATED RDW RBC AUTO: 46.7 FL (ref 37–54)
EGFRCR SERPLBLD CKD-EPI 2021: 54.2 ML/MIN/1.73
EOSINOPHIL # BLD AUTO: 0.28 10*3/MM3 (ref 0–0.4)
EOSINOPHIL NFR BLD AUTO: 4 % (ref 0.3–6.2)
ERYTHROCYTE [DISTWIDTH] IN BLOOD BY AUTOMATED COUNT: 13.9 % (ref 12.3–15.4)
GLOBULIN UR ELPH-MCNC: 2.9 GM/DL
GLUCOSE SERPL-MCNC: 108 MG/DL (ref 65–99)
HBA1C MFR BLD: 5.9 % (ref 4.8–5.6)
HCT VFR BLD AUTO: 39.8 % (ref 37.5–51)
HDLC SERPL-MCNC: 35 MG/DL (ref 40–60)
HGB BLD-MCNC: 13.6 G/DL (ref 13–17.7)
IMM GRANULOCYTES # BLD AUTO: 0.02 10*3/MM3 (ref 0–0.05)
IMM GRANULOCYTES NFR BLD AUTO: 0.3 % (ref 0–0.5)
LDLC SERPL CALC-MCNC: 40 MG/DL (ref 0–100)
LDLC/HDLC SERPL: 1.04 {RATIO}
LYMPHOCYTES # BLD AUTO: 1.71 10*3/MM3 (ref 0.7–3.1)
LYMPHOCYTES NFR BLD AUTO: 24.3 % (ref 19.6–45.3)
MCH RBC QN AUTO: 31.2 PG (ref 26.6–33)
MCHC RBC AUTO-ENTMCNC: 34.2 G/DL (ref 31.5–35.7)
MCV RBC AUTO: 91.3 FL (ref 79–97)
MONOCYTES # BLD AUTO: 0.64 10*3/MM3 (ref 0.1–0.9)
MONOCYTES NFR BLD AUTO: 9.1 % (ref 5–12)
NEUTROPHILS NFR BLD AUTO: 4.37 10*3/MM3 (ref 1.7–7)
NEUTROPHILS NFR BLD AUTO: 61.9 % (ref 42.7–76)
NRBC BLD AUTO-RTO: 0 /100 WBC (ref 0–0.2)
PLATELET # BLD AUTO: 181 10*3/MM3 (ref 140–450)
PMV BLD AUTO: 9.7 FL (ref 6–12)
POTASSIUM SERPL-SCNC: 4.3 MMOL/L (ref 3.5–5.2)
PROT SERPL-MCNC: 7.3 G/DL (ref 6–8.5)
RBC # BLD AUTO: 4.36 10*6/MM3 (ref 4.14–5.8)
SODIUM SERPL-SCNC: 139 MMOL/L (ref 136–145)
TRIGL SERPL-MCNC: 133 MG/DL (ref 0–150)
TSH SERPL DL<=0.05 MIU/L-ACNC: 4.17 UIU/ML (ref 0.27–4.2)
VLDLC SERPL-MCNC: 23 MG/DL (ref 5–40)
WBC NRBC COR # BLD AUTO: 7.05 10*3/MM3 (ref 3.4–10.8)

## 2025-03-03 PROCEDURE — 85025 COMPLETE CBC W/AUTO DIFF WBC: CPT

## 2025-03-03 PROCEDURE — 82043 UR ALBUMIN QUANTITATIVE: CPT

## 2025-03-03 PROCEDURE — 83036 HEMOGLOBIN GLYCOSYLATED A1C: CPT

## 2025-03-03 PROCEDURE — 84443 ASSAY THYROID STIM HORMONE: CPT

## 2025-03-03 PROCEDURE — 82570 ASSAY OF URINE CREATININE: CPT

## 2025-03-03 PROCEDURE — 36415 COLL VENOUS BLD VENIPUNCTURE: CPT

## 2025-03-03 PROCEDURE — 80053 COMPREHEN METABOLIC PANEL: CPT

## 2025-03-03 PROCEDURE — 80061 LIPID PANEL: CPT

## 2025-03-03 RX ORDER — METOPROLOL SUCCINATE 100 MG/1
100 TABLET, EXTENDED RELEASE ORAL DAILY
Qty: 90 TABLET | Refills: 1 | Status: SHIPPED | OUTPATIENT
Start: 2025-03-03

## 2025-03-13 ENCOUNTER — OFFICE VISIT (OUTPATIENT)
Dept: PULMONOLOGY | Facility: CLINIC | Age: 80
End: 2025-03-13
Payer: MEDICARE

## 2025-03-13 VITALS
RESPIRATION RATE: 16 BRPM | HEART RATE: 63 BPM | HEIGHT: 70 IN | SYSTOLIC BLOOD PRESSURE: 143 MMHG | OXYGEN SATURATION: 95 % | BODY MASS INDEX: 34.36 KG/M2 | WEIGHT: 240 LBS | TEMPERATURE: 98.2 F | DIASTOLIC BLOOD PRESSURE: 84 MMHG

## 2025-03-13 DIAGNOSIS — J43.9 PULMONARY EMPHYSEMA, UNSPECIFIED EMPHYSEMA TYPE: Chronic | ICD-10-CM

## 2025-03-13 DIAGNOSIS — F17.211 NICOTINE DEPENDENCE, CIGARETTES, IN REMISSION: Primary | ICD-10-CM

## 2025-03-13 DIAGNOSIS — J44.9 CHRONIC OBSTRUCTIVE PULMONARY DISEASE, UNSPECIFIED COPD TYPE: Chronic | ICD-10-CM

## 2025-03-13 NOTE — PROGRESS NOTES
Faxed to [facility] [phone].   Primary Care Provider  Farooq Magana MD     Referring Provider  No ref. provider found       Patient or patient representative verbalized consent for the use of Ambient Listening during the visit with  MYKEL Dahl for chart documentation. 3/13/2025  14:42 EDT    Chief Complaint  COPD and Follow-up (1 year)    Subjective          Frank Winter presents to [facility]  History of Present Illness  Frank Winter is a 79 y.o. male patient of Dr. Tee here for management of COPD, emphysema, shortness of breath and tobacco use of cigarettes in remission.     History of Present Illness  The patient is a 79-year-old male who presents for evaluation of respiratory issues.    He reports satisfactory respiratory function, attributing this to his use of Trelegy. He has discontinued the use of albuterol and has not required any recent courses of antibiotics or steroids for his respiratory condition. He also reports no systemic symptoms such as fevers or chills, and no expectoration. He is on Trelegy. He has discontinued the use of albuterol.        MEDICATIONS  Trelegy, albuterol (discontinued).       His history of smoking is   Tobacco Use: Medium Risk (3/13/2025)    Patient History     Smoking Tobacco Use: Former     Smokeless Tobacco Use: Never     Passive Exposure: Past   .    Review of Systems   Constitutional:  Negative for chills, fatigue, fever, unexpected weight gain and unexpected weight loss.   HENT:  Congestion: Nasal.    Respiratory:  Negative for apnea, cough, shortness of breath and wheezing.         Negative for Hemoptysis     Cardiovascular:  Negative for chest pain, palpitations and leg swelling.   Skin:         Negative for cyanosis      Sleep: Negative for Excessive daytime sleepiness  Negative for morning headaches  Negative for Snoring    Family History   Problem Relation Age of Onset    Heart disease Father     Colon cancer  Brother     Cancer Brother         Social History     Socioeconomic History    Marital status:    Tobacco Use    Smoking status: Former     Current packs/day: 0.00     Average packs/day: 1 pack/day for 53.0 years (53.0 ttl pk-yrs)     Types: Cigarettes     Start date: 1966     Quit date: 2019     Years since quittin.3     Passive exposure: Past    Smokeless tobacco: Never   Vaping Use    Vaping status: Never Used   Substance and Sexual Activity    Alcohol use: Never    Drug use: Never    Sexual activity: Defer        Past Medical History:   Diagnosis Date    Anemia     Arthritis     Tobias esophagus     CAD S/P percutaneous coronary angioplasty 2021    1 stent placed in     Chronic bronchitis     COPD     INHALERS    Diabetes mellitus     Diverticulosis     Emphysema lung     Essential hypertension 2021    GERD (gastroesophageal reflux disease)     Heart attack     Hemorrhoids     RECTAL PROB     Hernia cerebri     History of aortic dissection 2021    Hyperlipidemia LDL goal <70 2021    Melena         Immunization History   Administered Date(s) Administered    COVID-19 (DDStocks) 03/10/2021, 2021    COVID-19 (PFIZER) BIVALENT 12+YRS 11/15/2022    Fluzone (or Fluarix & Flulaval for VFC) >6mos 2022    Fluzone High-Dose 65+YRS 2023, 11/15/2024    Fluzone High-Dose 65+yrs 2021    Influenza, Unspecified 2021    Pneumococcal Conjugate 13-Valent (PCV13) 2016    Pneumococcal Polysaccharide (PPSV23) 10/09/2012, 2019    Tdap 10/27/2014         No Known Allergies       Current Outpatient Medications:     Accu-Chek Guide test strip, , Disp: , Rfl:     Accu-Chek Softclix Lancets lancets, , Disp: , Rfl:     amlodipine-olmesartan (FITO) 10-40 MG per tablet, Take 1 tablet by mouth Daily., Disp: 90 tablet, Rfl: 3    aspirin 81 MG EC tablet, aspirin 81 mg oral tablet,delayed release (DR/EC) take 1 tablet (81 mg) by oral route once daily    Active, Disp: , Rfl:     atorvastatin (LIPITOR) 80 MG tablet, TAKE 1 TABLET EVERY NIGHT, Disp: 90 tablet, Rfl: 1    cyanocobalamin (VITAMIN B-12) 500 MCG tablet, Take 1 tablet by mouth., Disp: , Rfl:     ezetimibe (ZETIA) 10 MG tablet, TAKE 1 TABLET BY MOUTH DAILY, Disp: 90 tablet, Rfl: 3    Fluticasone-Umeclidin-Vilant (TRELEGY) 100-62.5-25 MCG/ACT inhaler, Inhale 1 puff Daily., Disp: 180 each, Rfl: 3    glipizide (GLUCOTROL XL) 2.5 MG 24 hr tablet, , Disp: , Rfl:     metFORMIN (GLUCOPHAGE) 500 MG tablet, , Disp: , Rfl:     metoprolol succinate XL (TOPROL-XL) 100 MG 24 hr tablet, TAKE 1 TABLET BY MOUTH DAILY, Disp: 90 tablet, Rfl: 1    omega-3 acid ethyl esters (LOVAZA) 1 g capsule, TAKE 2 CAPSULES BY MOUTH EVERY MORNING AND TAKE TWO CAPSULES BY MOUTH IN THE EVENING, Disp: 360 capsule, Rfl: 1    sertraline (ZOLOFT) 50 MG tablet, TAKE 1 TABLET EVERY DAY, Disp: 90 tablet, Rfl: 1    albuterol sulfate  (90 Base) MCG/ACT inhaler, Inhale 2 puffs Every 4 (Four) Hours As Needed for Wheezing. (Patient not taking: Reported on 3/13/2025), Disp: 54 g, Rfl: 11    nebivolol (BYSTOLIC) 20 MG tablet, Take 1 tablet by mouth Daily. (Patient not taking: Reported on 3/13/2025), Disp: 90 tablet, Rfl: 3    pantoprazole (PROTONIX) 40 MG EC tablet, TAKE 1 TABLET BY MOUTH DAILY (Patient not taking: Reported on 3/13/2025), Disp: 90 tablet, Rfl: 1     Objective   Physical Exam  Constitutional:       General: He is not in acute distress.     Appearance: Normal appearance. He is normal weight.   HENT:      Right Ear: Hearing normal.      Left Ear: Hearing normal.      Nose: No nasal tenderness or congestion.      Mouth/Throat:      Mouth: Mucous membranes are moist. No oral lesions.   Eyes:      Extraocular Movements: Extraocular movements intact.      Pupils: Pupils are equal, round, and reactive to light.   Cardiovascular:      Rate and Rhythm: Normal rate and regular rhythm.      Pulses: Normal pulses.      Heart sounds: Normal heart  "sounds. No murmur heard.  Pulmonary:      Effort: Pulmonary effort is normal.      Breath sounds: Normal breath sounds. No wheezing, rhonchi or rales.   Musculoskeletal:      Right lower leg: No edema.      Left lower leg: No edema.   Skin:     General: Skin is warm and dry.      Findings: No lesion or rash.   Neurological:      General: No focal deficit present.      Mental Status: He is alert and oriented to person, place, and time.   Psychiatric:         Mood and Affect: Affect normal. Mood is not anxious or depressed.         Vital Signs:   /84 (BP Location: Left arm, Patient Position: Sitting, Cuff Size: Adult)   Pulse 63   Temp 98.2 °F (36.8 °C) (Temporal)   Resp 16   Ht 177.8 cm (70\")   Wt 109 kg (240 lb)   SpO2 95% Comment: RA  BMI 34.44 kg/m²        Result Review :   The following data was reviewed by: MYKEL Dahl on 03/13/2025:  CMP          5/13/2024    09:57 9/16/2024    08:19 3/3/2025    08:53   CMP   Glucose 124  113  108    BUN 35  22  18    Creatinine 1.49  1.12  1.34    EGFR 48.0  67.2  54.2    Sodium 141  139  139    Potassium 3.9  4.2  4.3    Chloride 103  104  101    Calcium 9.4  9.4  10.1    Total Protein 7.3   7.3    Albumin 4.7   4.4    Globulin 2.6   2.9    Total Bilirubin 0.4   0.4    Alkaline Phosphatase 76   78    AST (SGOT) 16   20    ALT (SGPT) 19   18    Albumin/Globulin Ratio 1.8   1.5    BUN/Creatinine Ratio 23.5  19.6  13.4    Anion Gap 13.0  8.9  11.0      CBC w/diff          5/13/2024    09:57 3/3/2025    08:53   CBC w/Diff   WBC 6.54  7.05    RBC 4.08  4.36    Hemoglobin 12.6  13.6    Hematocrit 37.7  39.8    MCV 92.4  91.3    MCH 30.9  31.2    MCHC 33.4  34.2    RDW 13.5  13.9    Platelets 173  181    Neutrophil Rel % 58.2  61.9    Immature Granulocyte Rel % 0.5  0.3    Lymphocyte Rel % 26.5  24.3    Monocyte Rel % 9.9  9.1    Eosinophil Rel % 4.3  4.0    Basophil Rel % 0.6  0.4      Data reviewed : Radiologic studies chest CT 4/11/2024, PFT 1/13/2020 and my " last office note    Procedures        Assessment and Plan    Diagnoses and all orders for this visit:    1. Nicotine dependence, cigarettes, in remission (Primary)  Comments:  out of range for LDCT    2. Chronic obstructive pulmonary disease, unspecified COPD type  Comments:  continue Trelegy  Orders:  -     Fluticasone-Umeclidin-Vilant (TRELEGY) 100-62.5-25 MCG/ACT inhaler; Inhale 1 puff Daily.  Dispense: 180 each; Refill: 3    3. Pulmonary emphysema, unspecified emphysema type  Comments:  continue Trelegy        Assessment & Plan  1. COPD  His respiratory status is currently stable with the use of Trelegy. He has not required albuterol recently and has not needed antibiotics or steroids. A prescription for Trelegy will be provided, ensuring a 3-month supply with three refills. The prescription will be sent to [facility] pharmacy.          Follow Up   Return in about 1 year (around 3/13/2026) for Recheck with Sindy.  Patient was given instructions and counseling regarding his condition or for health maintenance advice. Please see specific information pulled into the AVS if appropriate.

## 2025-03-18 ENCOUNTER — OFFICE VISIT (OUTPATIENT)
Dept: CARDIOLOGY | Facility: CLINIC | Age: 80
End: 2025-03-18
Payer: MEDICARE

## 2025-03-18 VITALS
HEART RATE: 63 BPM | WEIGHT: 239.1 LBS | DIASTOLIC BLOOD PRESSURE: 75 MMHG | SYSTOLIC BLOOD PRESSURE: 148 MMHG | BODY MASS INDEX: 34.23 KG/M2 | HEIGHT: 70 IN

## 2025-03-18 DIAGNOSIS — I25.10 CAD S/P PERCUTANEOUS CORONARY ANGIOPLASTY: Primary | ICD-10-CM

## 2025-03-18 DIAGNOSIS — I35.0 AORTIC STENOSIS, MILD: ICD-10-CM

## 2025-03-18 DIAGNOSIS — I10 ESSENTIAL HYPERTENSION: ICD-10-CM

## 2025-03-18 DIAGNOSIS — E78.5 HYPERLIPIDEMIA LDL GOAL <70: ICD-10-CM

## 2025-03-18 DIAGNOSIS — Z98.61 CAD S/P PERCUTANEOUS CORONARY ANGIOPLASTY: Primary | ICD-10-CM

## 2025-03-18 PROCEDURE — 3078F DIAST BP <80 MM HG: CPT | Performed by: NURSE PRACTITIONER

## 2025-03-18 PROCEDURE — 1160F RVW MEDS BY RX/DR IN RCRD: CPT | Performed by: NURSE PRACTITIONER

## 2025-03-18 PROCEDURE — 99214 OFFICE O/P EST MOD 30 MIN: CPT | Performed by: NURSE PRACTITIONER

## 2025-03-18 PROCEDURE — 1159F MED LIST DOCD IN RCRD: CPT | Performed by: NURSE PRACTITIONER

## 2025-03-18 PROCEDURE — 3077F SYST BP >= 140 MM HG: CPT | Performed by: NURSE PRACTITIONER

## 2025-03-18 RX ORDER — CARVEDILOL 12.5 MG/1
12.5 TABLET ORAL 2 TIMES DAILY
Qty: 60 TABLET | Refills: 1 | Status: SHIPPED | OUTPATIENT
Start: 2025-03-18

## 2025-03-18 RX ORDER — AMLODIPINE AND OLMESARTAN MEDOXOMIL 10; 40 MG/1; MG/1
1 TABLET ORAL DAILY
Qty: 90 TABLET | Refills: 3 | Status: SHIPPED | OUTPATIENT
Start: 2025-03-18

## 2025-03-18 NOTE — PROGRESS NOTES
Chief Complaint  Follow-up, Coronary Artery Disease, Hypertension, Hyperlipidemia, and Aortic Stenosis    Subjective            History of Present Illness  Farnk Winter is a 79-year-old male patient who presents to the office today for follow up.    History of Present Illness  The patient presents for follow-up of coronary artery disease requiring angioplasty in the past, hypertension, cholesterol management, and a mild aortic valve murmur.    He reports no new or worsening cardiac symptoms since his last visit. He has been experiencing elevated blood pressure readings at home. He is considering increasing the dosage of one of his antihypertensive medications. He is on amlodipine-olmesartan 10-40 mg daily and metoprolol 100 mg daily for blood pressure and heart rate regulation. He was previously switched from metoprolol to nebivolol on 07/08/2024 but was reverted back to metoprolol on 09/04/2024 due to unspecified reasons.    He is on aspirin 81 mg daily for coronary artery disease.    He is on atorvastatin 80 mg daily along with Zetia 10 mg daily for cholesterol lowering and Lovaza omega-3 fatty acid 2 capsules in the morning and 2 capsules in the evening.    He has a mild heart murmur that is being monitored in the aortic valve.    MEDICATIONS  Amlodipine-olmesartan, aspirin, atorvastatin, Zetia, Lovaza, metoprolol.        PMH  Past Medical History:   Diagnosis Date    Anemia     Arthritis     Tobias esophagus     CAD S/P percutaneous coronary angioplasty 08/23/2021    1 stent placed in 2011    Chronic bronchitis     COPD     INHALERS    Diabetes mellitus     Diverticulosis     Emphysema lung     Essential hypertension 08/23/2021    GERD (gastroesophageal reflux disease)     Heart attack 2011    Hemorrhoids     RECTAL PROB     Hernia cerebri     History of aortic dissection 08/23/2021    Hyperlipidemia LDL goal <70 08/23/2021    Melena          ALLERGY  No Known Allergies       SURGICALHX  Past Surgical  History:   Procedure Laterality Date    COLONOSCOPY      Dr. Griffin    COLONOSCOPY N/A 2022    Procedure: COLONOSCOPY;  Surgeon: Amado Griffin MD;  Location: [facility] ENDOSCOPY;  Service: Gastroenterology;  Laterality: N/A;  DIVERTICULOSIS, HEMORRHOIDS    ENDOSCOPY      ENDOSCOPY N/A 2024    Procedure: ESOPHAGOGASTRODUODENOSCOPY WITH BIOPSY POLYPECTOMY, BIOPSY;  Surgeon: Amado Griffin MD;  Location: [facility] ENDOSCOPY;  Service: Gastroenterology;  Laterality: N/A;  GASTRIC POLYP, HIATAL HERNIA, IRREGULAR GE JUNCTION    HERNIA REPAIR           ORTHOPEDIC SURGERY      LT          SOC  Social History     Socioeconomic History    Marital status:    Tobacco Use    Smoking status: Former     Current packs/day: 0.00     Average packs/day: 1 pack/day for 53.0 years (53.0 ttl pk-yrs)     Types: Cigarettes     Start date: 1966     Quit date: 2019     Years since quittin.3     Passive exposure: Past    Smokeless tobacco: Never   Vaping Use    Vaping status: Never Used   Substance and Sexual Activity    Alcohol use: Never    Drug use: Never    Sexual activity: Defer         FAMHX  Family History   Problem Relation Age of Onset    Heart disease Father     Colon cancer Brother     Cancer Brother           MEDSIGONLY  Current Outpatient Medications on File Prior to Visit   Medication Sig    Accu-Chek Guide test strip     Accu-Chek Softclix Lancets lancets     albuterol sulfate  (90 Base) MCG/ACT inhaler Inhale 2 puffs Every 4 (Four) Hours As Needed for Wheezing.    amlodipine-olmesartan (FITO) 10-40 MG per tablet Take 1 tablet by mouth Daily.    aspirin 81 MG EC tablet aspirin 81 mg oral tablet,delayed release (DR/EC) take 1 tablet (81 mg) by oral route once daily   Active    atorvastatin (LIPITOR) 80 MG tablet TAKE 1 TABLET EVERY NIGHT    cyanocobalamin (VITAMIN B-12) 500 MCG tablet Take 1 tablet by mouth.    ezetimibe (ZETIA) 10 MG tablet TAKE 1 TABLET BY MOUTH DAILY  "   Fluticasone-Umeclidin-Vilant (TRELEGY) 100-62.5-25 MCG/ACT inhaler Inhale 1 puff Daily.    glipizide (GLUCOTROL XL) 2.5 MG 24 hr tablet     metFORMIN (GLUCOPHAGE) 500 MG tablet     metoprolol succinate XL (TOPROL-XL) 100 MG 24 hr tablet TAKE 1 TABLET BY MOUTH DAILY    omega-3 acid ethyl esters (LOVAZA) 1 g capsule TAKE 2 CAPSULES BY MOUTH EVERY MORNING AND TAKE TWO CAPSULES BY MOUTH IN THE EVENING    [DISCONTINUED] nebivolol (BYSTOLIC) 20 MG tablet Take 1 tablet by mouth Daily. (Patient not taking: Reported on 3/13/2025)    [DISCONTINUED] pantoprazole (PROTONIX) 40 MG EC tablet TAKE 1 TABLET BY MOUTH DAILY (Patient not taking: Reported on 3/13/2025)    [DISCONTINUED] sertraline (ZOLOFT) 50 MG tablet TAKE 1 TABLET EVERY DAY     No current facility-administered medications on file prior to visit.         Objective   /75   Pulse 63   Ht 177.8 cm (70\")   Wt 108 kg (239 lb 1.6 oz)   BMI 34.31 kg/m²       Physical Exam  HENT:      Head: Normocephalic.   Neck:      Vascular: No carotid bruit.   Cardiovascular:      Rate and Rhythm: Normal rate and regular rhythm.      Pulses: Normal pulses.      Heart sounds: Murmur heard.   Pulmonary:      Effort: Pulmonary effort is normal.      Breath sounds: Normal breath sounds.   Musculoskeletal:      Cervical back: Neck supple.      Right lower leg: No edema.      Left lower leg: No edema.   Skin:     General: Skin is dry.   Neurological:      Mental Status: He is alert and oriented to person, place, and time.   Psychiatric:         Behavior: Behavior normal.       Result Review :   The following data was reviewed by: MYKEL Perez on 03/18/2025:  No results found for: \"PROBNP\"  CMP          9/16/2024    08:19 3/3/2025    08:53   CMP   Glucose 113  108    BUN 22  18    Creatinine 1.12  1.34    EGFR 67.2  54.2    Sodium 139  139    Potassium 4.2  4.3    Chloride 104  101    Calcium 9.4  10.1    Total Protein  7.3    Albumin  4.4    Globulin  2.9    Total " "Bilirubin  0.4    Alkaline Phosphatase  78    AST (SGOT)  20    ALT (SGPT)  18    Albumin/Globulin Ratio  1.5    BUN/Creatinine Ratio 19.6  13.4    Anion Gap 8.9  11.0      CBC w/diff          5/13/2024    09:57 3/3/2025    08:53   CBC w/Diff   WBC 6.54  7.05    RBC 4.08  4.36    Hemoglobin 12.6  13.6    Hematocrit 37.7  39.8    MCV 92.4  91.3    MCH 30.9  31.2    MCHC 33.4  34.2    RDW 13.5  13.9    Platelets 173  181    Neutrophil Rel % 58.2  61.9    Immature Granulocyte Rel % 0.5  0.3    Lymphocyte Rel % 26.5  24.3    Monocyte Rel % 9.9  9.1    Eosinophil Rel % 4.3  4.0    Basophil Rel % 0.6  0.4       Lab Results   Component Value Date    TSH 4.170 03/03/2025      Lab Results   Component Value Date    FREET4 1.21 01/11/2024      No results found for: \"DDIMERQUANT\"  Magnesium   Date Value Ref Range Status   03/08/2024 1.7 1.6 - 2.4 mg/dL Final      No results found for: \"DIGOXIN\"   Lab Results   Component Value Date    TROPONINT <0.01 11/17/2019           Lipid Panel          5/13/2024    09:57 3/3/2025    08:53   Lipid Panel   Total Cholesterol 117  98    Triglycerides 195  133    HDL Cholesterol 35  35    VLDL Cholesterol 32  23    LDL Cholesterol  50  40    LDL/HDL Ratio 1.23  1.04        Results for orders placed in visit on 03/27/23    Adult Transthoracic Echo Complete W/ Cont if Necessary Per Protocol    Interpretation Summary    Left ventricular systolic function is normal. Calculated left ventricular EF = 65%    Left ventricular wall thickness is consistent with mild concentric hypertrophy.    Left ventricular diastolic function was normal.    Mild aortic valve stenosis is present.    Borderline dilation of the aortic root is present.           Assessment and Plan    Diagnoses and all orders for this visit:    1. CAD S/P percutaneous coronary angioplasty (Primary)    2. Essential hypertension    3. Hyperlipidemia LDL goal <70    4. Aortic stenosis, mild      Assessment & Plan  1. Hypertension.  His blood " pressure readings have been consistently elevated. He is currently on amlodipine-olmesartan 10-40 mg daily and metoprolol 100 mg daily. Metoprolol will be discontinued and replaced with carvedilol 12.5 mg twice daily. A 30-day supply of carvedilol will be sent to [facility] pharmacy on [address]. He is advised to maintain a log of his morning and evening blood pressure and heart rate for 2 weeks post-initiation of carvedilol. The completed log can be submitted via mail, fax, or in person at the . Upon receipt of the log, the readings will be reviewed to determine if any adjustments to the treatment plan are necessary. If the decision is made to continue with carvedilol, the prescription will be extended to a 90-day supply. He will continue taking amlodipine-olmesartan 10-40 mg daily.    2. Coronary artery disease.  He is currently taking aspirin 81 mg daily and atorvastatin 80 mg daily along with Zetia 10 mg daily for cholesterol management. His blood work from August 3, 2024, showed stable kidney function, and all other parameters including electrolytes, liver enzymes, blood counts, and cholesterol levels were within the desired range. No changes to his current medication regimen are necessary at this time.    3. Cholesterol management.  He is on atorvastatin 80 mg daily along with Zetia 10 mg daily for cholesterol lowering and Lovaza omega-3 fatty acid 2 capsules in the morning and 2 capsules in the evening.    4. Mild heart murmur.  An echocardiogram conducted in March 2023 revealed a very mild heart murmur. A repeat echocardiogram can be scheduled for the next year or two.    Follow-up  The patient is scheduled for a follow-up visit with Dr. Kevin in approximately 6 months. However, if there are difficulties in managing his blood pressure, an earlier appointment may be necessary.      Follow Up   Return in about 6 months (around 9/18/2025) for Follow up with Dr Kevin.    Patient was given instructions and  counseling regarding his condition or for health maintenance advice. Please see specific information pulled into the AVS if appropriate.     Frank Winter  reports that he quit smoking about 5 years ago. His smoking use included cigarettes. He started smoking about 58 years ago. He has a 53 pack-year smoking history. He has been exposed to tobacco smoke. He has never used smokeless tobacco.          Patient or patient representative verbalized consent for the use of Ambient Listening during the visit with  MYKEL Perez for chart documentation. 3/18/2025  10:36 EDT    MYKEL Perez  03/18/25  09:07 EDT    Dictated Utilizing Dragon Dictation

## 2025-04-04 ENCOUNTER — TELEPHONE (OUTPATIENT)
Dept: CARDIOLOGY | Facility: CLINIC | Age: 80
End: 2025-04-04
Payer: MEDICARE

## 2025-04-04 RX ORDER — CARVEDILOL 25 MG/1
25 TABLET ORAL 2 TIMES DAILY
Qty: 60 TABLET | Refills: 0 | Status: SHIPPED | OUTPATIENT
Start: 2025-04-04

## 2025-04-04 NOTE — TELEPHONE ENCOUNTER
Blood pressures look better but still mildly elevated  Continue amlodipine-olmesartan & increase carvedilol dose to 25 mg BID  Continue to monitor twice daily for the next week and send log for review

## 2025-04-04 NOTE — TELEPHONE ENCOUNTER
Per Faviola:  Blood pressures look better but still mildly elevated  Continue amlodipine-olmesartan & increase carvedilol dose to 25 mg BID  Continue to monitor twice daily for the next week and send log for review     Spoke with patient, patient is aware and verbalized understanding. Sending Carvedilol 25mg, 30 day supply to [facility].

## 2025-04-24 ENCOUNTER — TELEPHONE (OUTPATIENT)
Dept: CARDIOLOGY | Facility: CLINIC | Age: 80
End: 2025-04-24
Payer: MEDICARE

## 2025-04-24 RX ORDER — CLONIDINE HYDROCHLORIDE 0.1 MG/1
0.1 TABLET ORAL DAILY PRN
Qty: 90 TABLET | Refills: 3 | Status: SHIPPED | OUTPATIENT
Start: 2025-04-24

## 2025-04-24 NOTE — TELEPHONE ENCOUNTER
Blood pressures are improved bu still with some elevated readings  Continue current medications  Add clonidine 0.1 mg daily as needed for SBP greater than 160

## 2025-05-01 NOTE — TELEPHONE ENCOUNTER
Rx Refill Note  Requested Prescriptions     Pending Prescriptions Disp Refills    carvedilol (COREG) 25 MG tablet [Pharmacy Med Name: CARVEDILOL 25 MG TABLET] 60 tablet 0     Sig: TAKE 1 TABLET BY MOUTH 2 TIMES A DAY        LAST OFFICE VISIT:  3/18/2025     NEXT OFFICE VISIT:  09/18/2025     Does the medication requests match the last office note:    [x] Yes   [] No   TE FROM 04/04 & 04/24    Does this refill request meet protocol details for MA to approve:     [] Yes   [x] No   [] No Protocols Provided   BP under 130/80 in past year and patient has diabetes, CAD or PVD

## 2025-05-02 RX ORDER — CARVEDILOL 25 MG/1
25 TABLET ORAL 2 TIMES DAILY
Qty: 180 TABLET | Refills: 1 | Status: SHIPPED | OUTPATIENT
Start: 2025-05-02

## 2025-05-05 ENCOUNTER — TELEPHONE (OUTPATIENT)
Dept: PULMONOLOGY | Facility: CLINIC | Age: 80
End: 2025-05-05

## 2025-05-05 NOTE — TELEPHONE ENCOUNTER
Hub staff attempted to follow warm transfer process and was unsuccessful     Caller: Frank Winter    Relationship to patient: Self    Best call back number: [phone]     Patient is needing: PT RECEIVED A LETTER STATING THAT HE IS PAST A YEARLY LUNG SCREENING. PLEASE CALL PT AND ADVISE

## 2025-05-21 ENCOUNTER — TELEPHONE (OUTPATIENT)
Dept: CARDIOLOGY | Facility: CLINIC | Age: 80
End: 2025-05-21
Payer: MEDICARE

## 2025-05-21 NOTE — TELEPHONE ENCOUNTER
----- Message from Faviola Dockery sent at 5/21/2025  8:34 AM EDT -----  Blood pressures are improved, continue current medications  ----- Message -----  From: Rosaura Sam RegSched Rep  Sent: 5/20/2025   3:20 PM EDT  To: MYKEL Sanders

## 2025-06-09 RX ORDER — OMEGA-3-ACID ETHYL ESTERS 1 G/1
CAPSULE, LIQUID FILLED ORAL
Qty: 360 CAPSULE | Refills: 1 | Status: SHIPPED | OUTPATIENT
Start: 2025-06-09

## (undated) DEVICE — SOLIDIFIER LIQLOC PLS 1500CC BT

## (undated) DEVICE — CONN JET HYDRA H20 AUXILIARY DISP

## (undated) DEVICE — BLCK/BITE BLOX WO/DENTL/RIM W/STRAP 54F

## (undated) DEVICE — SOL IRRG H2O PL/BG 1000ML STRL

## (undated) DEVICE — LINER SURG CANSTR SXN S/RIGD 1500CC

## (undated) DEVICE — Device: Brand: DEFENDO AIR/WATER/SUCTION AND BIOPSY VALVE

## (undated) DEVICE — Device

## (undated) DEVICE — SINGLE-USE BIOPSY FORCEPS: Brand: RADIAL JAW 4